# Patient Record
Sex: FEMALE | Race: BLACK OR AFRICAN AMERICAN | NOT HISPANIC OR LATINO | Employment: OTHER | URBAN - NONMETROPOLITAN AREA
[De-identification: names, ages, dates, MRNs, and addresses within clinical notes are randomized per-mention and may not be internally consistent; named-entity substitution may affect disease eponyms.]

---

## 2017-01-19 RX ORDER — HYDRALAZINE HYDROCHLORIDE 50 MG/1
TABLET, FILM COATED ORAL
Qty: 90 TABLET | Refills: 2 | Status: SHIPPED | OUTPATIENT
Start: 2017-01-19 | End: 2017-10-20 | Stop reason: SDUPTHER

## 2017-01-23 ENCOUNTER — OFFICE VISIT (OUTPATIENT)
Dept: FAMILY MEDICINE CLINIC | Facility: CLINIC | Age: 82
End: 2017-01-23

## 2017-01-23 VITALS
RESPIRATION RATE: 15 BRPM | BODY MASS INDEX: 33.12 KG/M2 | WEIGHT: 199 LBS | HEART RATE: 60 BPM | DIASTOLIC BLOOD PRESSURE: 67 MMHG | TEMPERATURE: 98 F | OXYGEN SATURATION: 95 % | SYSTOLIC BLOOD PRESSURE: 132 MMHG

## 2017-01-23 DIAGNOSIS — E11.65 UNCONTROLLED TYPE 2 DIABETES MELLITUS WITH HYPERGLYCEMIA, WITHOUT LONG-TERM CURRENT USE OF INSULIN (HCC): ICD-10-CM

## 2017-01-23 DIAGNOSIS — E53.8 VITAMIN B12 DEFICIENCY: ICD-10-CM

## 2017-01-23 DIAGNOSIS — E03.9 ACQUIRED HYPOTHYROIDISM: ICD-10-CM

## 2017-01-23 DIAGNOSIS — E78.2 MIXED HYPERLIPIDEMIA: ICD-10-CM

## 2017-01-23 DIAGNOSIS — I10 BENIGN ESSENTIAL HYPERTENSION: ICD-10-CM

## 2017-01-23 LAB
ALBUMIN SERPL-MCNC: 4.1 G/DL (ref 3.2–4.8)
ALBUMIN/GLOB SERPL: 1.2 G/DL (ref 1.5–2.5)
ALP SERPL-CCNC: 54 U/L (ref 25–100)
ALT SERPL W P-5'-P-CCNC: 38 U/L (ref 7–40)
ANION GAP SERPL CALCULATED.3IONS-SCNC: 12 MMOL/L (ref 3–11)
ARTICHOKE IGE QN: 133 MG/DL (ref 0–130)
AST SERPL-CCNC: 40 U/L (ref 0–33)
BASOPHILS # BLD AUTO: 0.04 10*3/MM3 (ref 0–0.2)
BASOPHILS NFR BLD AUTO: 0.6 % (ref 0–1)
BILIRUB SERPL-MCNC: 1.2 MG/DL (ref 0.3–1.2)
BUN BLD-MCNC: 15 MG/DL (ref 9–23)
BUN/CREAT SERPL: 16.7 (ref 7–25)
CALCIUM SPEC-SCNC: 9.7 MG/DL (ref 8.7–10.4)
CHLORIDE SERPL-SCNC: 97 MMOL/L (ref 99–109)
CHOLEST SERPL-MCNC: 231 MG/DL (ref 0–200)
CO2 SERPL-SCNC: 30 MMOL/L (ref 20–31)
CREAT BLD-MCNC: 0.9 MG/DL (ref 0.6–1.3)
DEPRECATED RDW RBC AUTO: 45 FL (ref 37–54)
EOSINOPHIL # BLD AUTO: 0.11 10*3/MM3 (ref 0.1–0.3)
EOSINOPHIL NFR BLD AUTO: 1.7 % (ref 0–3)
ERYTHROCYTE [DISTWIDTH] IN BLOOD BY AUTOMATED COUNT: 13.1 % (ref 11.3–14.5)
GFR SERPL CREATININE-BSD FRML MDRD: 73 ML/MIN/1.73
GLOBULIN UR ELPH-MCNC: 3.5 GM/DL
GLUCOSE BLD-MCNC: 146 MG/DL (ref 70–100)
HBA1C MFR BLD: 6.6 % (ref 4.8–5.6)
HCT VFR BLD AUTO: 47.7 % (ref 34.5–44)
HDLC SERPL-MCNC: 79 MG/DL (ref 40–60)
HGB BLD-MCNC: 16.1 G/DL (ref 11.5–15.5)
IMM GRANULOCYTES # BLD: 0.02 10*3/MM3 (ref 0–0.03)
IMM GRANULOCYTES NFR BLD: 0.3 % (ref 0–0.6)
LYMPHOCYTES # BLD AUTO: 1.93 10*3/MM3 (ref 0.6–4.8)
LYMPHOCYTES NFR BLD AUTO: 30.3 % (ref 24–44)
MCH RBC QN AUTO: 31.3 PG (ref 27–31)
MCHC RBC AUTO-ENTMCNC: 33.8 G/DL (ref 32–36)
MCV RBC AUTO: 92.8 FL (ref 80–99)
MONOCYTES # BLD AUTO: 0.48 10*3/MM3 (ref 0–1)
MONOCYTES NFR BLD AUTO: 7.5 % (ref 0–12)
NEUTROPHILS # BLD AUTO: 3.8 10*3/MM3 (ref 1.5–8.3)
NEUTROPHILS NFR BLD AUTO: 59.6 % (ref 41–71)
PLATELET # BLD AUTO: 182 10*3/MM3 (ref 150–450)
PMV BLD AUTO: 12.9 FL (ref 6–12)
POTASSIUM BLD-SCNC: 4 MMOL/L (ref 3.5–5.5)
PROT SERPL-MCNC: 7.6 G/DL (ref 5.7–8.2)
RBC # BLD AUTO: 5.14 10*6/MM3 (ref 3.89–5.14)
SODIUM BLD-SCNC: 139 MMOL/L (ref 132–146)
TRIGL SERPL-MCNC: 68 MG/DL (ref 0–150)
TSH SERPL DL<=0.05 MIU/L-ACNC: 2.21 MIU/ML (ref 0.35–5.35)
VIT B12 BLD-MCNC: 775 PG/ML (ref 211–911)
WBC NRBC COR # BLD: 6.38 10*3/MM3 (ref 3.5–10.8)

## 2017-01-23 PROCEDURE — 83036 HEMOGLOBIN GLYCOSYLATED A1C: CPT | Performed by: INTERNAL MEDICINE

## 2017-01-23 PROCEDURE — 85025 COMPLETE CBC W/AUTO DIFF WBC: CPT | Performed by: INTERNAL MEDICINE

## 2017-01-23 PROCEDURE — 80053 COMPREHEN METABOLIC PANEL: CPT | Performed by: INTERNAL MEDICINE

## 2017-01-23 PROCEDURE — 82607 VITAMIN B-12: CPT | Performed by: INTERNAL MEDICINE

## 2017-01-23 PROCEDURE — 84443 ASSAY THYROID STIM HORMONE: CPT | Performed by: INTERNAL MEDICINE

## 2017-01-23 PROCEDURE — 80061 LIPID PANEL: CPT | Performed by: INTERNAL MEDICINE

## 2017-01-23 PROCEDURE — 99213 OFFICE O/P EST LOW 20 MIN: CPT | Performed by: INTERNAL MEDICINE

## 2017-01-23 RX ORDER — FUROSEMIDE 20 MG/1
20 TABLET ORAL DAILY
Qty: 90 TABLET | Refills: 3 | Status: SHIPPED | OUTPATIENT
Start: 2017-01-23 | End: 2017-09-18 | Stop reason: SDUPTHER

## 2017-01-23 RX ORDER — METOPROLOL SUCCINATE 100 MG/1
100 TABLET, EXTENDED RELEASE ORAL DAILY
Qty: 90 TABLET | Refills: 3 | Status: SHIPPED | OUTPATIENT
Start: 2017-01-23 | End: 2017-09-18 | Stop reason: SDUPTHER

## 2017-01-23 RX ORDER — AMLODIPINE BESYLATE 10 MG/1
10 TABLET ORAL DAILY
Qty: 90 TABLET | Refills: 3 | Status: SHIPPED | OUTPATIENT
Start: 2017-01-23 | End: 2017-09-18 | Stop reason: SDUPTHER

## 2017-01-23 RX ORDER — OLMESARTAN MEDOXOMIL 40 MG/1
40 TABLET ORAL DAILY
Qty: 90 TABLET | Refills: 3 | Status: SHIPPED | OUTPATIENT
Start: 2017-01-23 | End: 2017-09-18 | Stop reason: SDUPTHER

## 2017-01-23 NOTE — PROGRESS NOTES
Subjective   Valerie Davila is a 81 y.o. female.     Chief Complaint   Patient presents with   • Diabetes   • Hypertension   • Hypothyroidism   • Hyperlipidemia       History of Present Illness   HPI: Patient is here to follow up on the blood pressure and cholesterol and sugar . The patient is taking the blood pressure medications as prescribed and has had no side effects. The patient also needs refills on medications . The patient is also here to follow up on the cholesterol and is trying to follow a diet. The patient is also here to follow on sugar. Patient also needs the medications to be refilled today. Patient is also to follow-up on her thyroid, she is fasting to get labwork done      The following portions of the patient's history were reviewed and updated as appropriate: allergies, current medications, past family history, past medical history, past social history, past surgical history and problem list.    Review of Systems   Constitutional: Negative for appetite change, fatigue and fever.   HENT: Negative for congestion, ear discharge, ear pain, sinus pressure and sore throat.    Eyes: Negative for pain and discharge.   Respiratory: Negative for cough, chest tightness, shortness of breath and wheezing.    Cardiovascular: Negative for chest pain, palpitations and leg swelling.   Gastrointestinal: Negative for abdominal pain, blood in stool, constipation, diarrhea and nausea.   Endocrine: Negative for cold intolerance and heat intolerance.   Genitourinary: Negative for dysuria, flank pain and frequency.   Musculoskeletal: Negative for back pain and joint swelling.   Skin: Negative for color change.   Allergic/Immunologic: Negative for environmental allergies and food allergies.   Neurological: Negative for dizziness, weakness, numbness and headaches.   Hematological: Negative for adenopathy. Does not bruise/bleed easily.   Psychiatric/Behavioral: Negative for behavioral problems and dysphoric mood. The  patient is not nervous/anxious.          Current Outpatient Prescriptions:   •  amLODIPine (NORVASC) 10 MG tablet, Take 1 tablet by mouth Daily., Disp: 90 tablet, Rfl: 3  •  furosemide (LASIX) 20 MG tablet, Take 1 tablet by mouth Daily., Disp: 90 tablet, Rfl: 3  •  hydrALAZINE (APRESOLINE) 50 MG tablet, TAKE ONE TABLET BY MOUTH DAILY, Disp: 90 tablet, Rfl: 2  •  levothyroxine (SYNTHROID) 75 MCG tablet, Take 1 tablet by mouth Daily., Disp: 90 tablet, Rfl: 2  •  metoprolol succinate XL (TOPROL-XL) 100 MG 24 hr tablet, Take 1 tablet by mouth Daily., Disp: 90 tablet, Rfl: 3  •  olmesartan (BENICAR) 40 MG tablet, Take 1 tablet by mouth Daily., Disp: 90 tablet, Rfl: 3  •  potassium chloride (KLOR-CON) 10 MEQ CR tablet, Take 1 tablet by mouth Daily., Disp: 90 tablet, Rfl: 3  •  pravastatin (PRAVACHOL) 40 MG tablet, Take 1 tablet by mouth Daily., Disp: 90 tablet, Rfl: 3  •  timolol (BETIMOL) 0.5 % ophthalmic solution, Apply  to eye., Disp: , Rfl:   •  travoprost, TA free, (TRAVATAN Z) 0.004 % solution ophthalmic solution, Apply  to eye., Disp: , Rfl:   •  amLODIPine (NORVASC) 10 MG tablet, , Disp: , Rfl:   •  KLOR-CON 10 MEQ CR tablet, TAKE ONE TABLET BY MOUTH DAILY, Disp: 90 tablet, Rfl: 2    Current Facility-Administered Medications:   •  cyanocobalamin injection 1,000 mcg, 1,000 mcg, Intramuscular, Q28 Days, Katarzyna Griggs MD, 1,000 mcg at 10/03/16 1518    Objective     Blood pressure 132/67, pulse 60, temperature 98 °F (36.7 °C), temperature source Oral, resp. rate 15, weight 199 lb (90.3 kg), SpO2 95 %.    Physical Exam   Constitutional: She is oriented to person, place, and time. She appears well-developed and well-nourished. No distress.   HENT:   Head: Normocephalic and atraumatic.   Right Ear: External ear normal.   Left Ear: External ear normal.   Nose: Nose normal.   Mouth/Throat: Oropharynx is clear and moist.   Eyes: Conjunctivae and EOM are normal. Pupils are equal, round, and reactive to light.   Neck: Neck  supple. No thyromegaly present.   Cardiovascular: Normal rate, regular rhythm and normal heart sounds.    Pulmonary/Chest: Effort normal and breath sounds normal. No respiratory distress.   Abdominal: Soft. Bowel sounds are normal. She exhibits no distension. There is no tenderness. There is no rebound.   Musculoskeletal: Normal range of motion. She exhibits no edema.   Lymphadenopathy:     She has no cervical adenopathy.   Neurological: She is alert and oriented to person, place, and time.   No gross motor or sensory deficits   Skin: Skin is warm. She is not diaphoretic.   Psychiatric: She has a normal mood and affect.   Nursing note and vitals reviewed.      Results for orders placed or performed in visit on 01/23/17   Comprehensive metabolic panel   Result Value Ref Range    Glucose 146 (H) 70 - 100 mg/dL    BUN 15 9 - 23 mg/dL    Creatinine 0.90 0.60 - 1.30 mg/dL    Sodium 139 132 - 146 mmol/L    Potassium 4.0 3.5 - 5.5 mmol/L    Chloride 97 (L) 99 - 109 mmol/L    CO2 30.0 20.0 - 31.0 mmol/L    Calcium 9.7 8.7 - 10.4 mg/dL    Total Protein 7.6 5.7 - 8.2 g/dL    Albumin 4.10 3.20 - 4.80 g/dL    ALT (SGPT) 38 7 - 40 U/L    AST (SGOT) 40 (H) 0 - 33 U/L    Alkaline Phosphatase 54 25 - 100 U/L    Total Bilirubin 1.2 0.3 - 1.2 mg/dL    eGFR  African Amer 73 >60 mL/min/1.73    Globulin 3.5 gm/dL    A/G Ratio 1.2 (L) 1.5 - 2.5 g/dL    BUN/Creatinine Ratio 16.7 7.0 - 25.0    Anion Gap 12.0 (H) 3.0 - 11.0 mmol/L   Lipid panel   Result Value Ref Range    Total Cholesterol 231 (H) 0 - 200 mg/dL    Triglycerides 68 0 - 150 mg/dL    HDL Cholesterol 79 (H) 40 - 60 mg/dL    LDL Cholesterol  133 (H) 0 - 130 mg/dL   Hemoglobin A1c   Result Value Ref Range    Hemoglobin A1C 6.60 (H) 4.80 - 5.60 %   TSH   Result Value Ref Range    TSH 2.208 0.350 - 5.350 mIU/mL   Vitamin B12   Result Value Ref Range    Vitamin B-12 775 211 - 911 pg/mL   CBC Auto Differential   Result Value Ref Range    WBC 6.38 3.50 - 10.80 10*3/mm3    RBC 5.14  3.89 - 5.14 10*6/mm3    Hemoglobin 16.1 (H) 11.5 - 15.5 g/dL    Hematocrit 47.7 (H) 34.5 - 44.0 %    MCV 92.8 80.0 - 99.0 fL    MCH 31.3 (H) 27.0 - 31.0 pg    MCHC 33.8 32.0 - 36.0 g/dL    RDW 13.1 11.3 - 14.5 %    RDW-SD 45.0 37.0 - 54.0 fl    MPV 12.9 (H) 6.0 - 12.0 fL    Platelets 182 150 - 450 10*3/mm3    Neutrophil % 59.6 41.0 - 71.0 %    Lymphocyte % 30.3 24.0 - 44.0 %    Monocyte % 7.5 0.0 - 12.0 %    Eosinophil % 1.7 0.0 - 3.0 %    Basophil % 0.6 0.0 - 1.0 %    Immature Grans % 0.3 0.0 - 0.6 %    Neutrophils, Absolute 3.80 1.50 - 8.30 10*3/mm3    Lymphocytes, Absolute 1.93 0.60 - 4.80 10*3/mm3    Monocytes, Absolute 0.48 0.00 - 1.00 10*3/mm3    Eosinophils, Absolute 0.11 0.10 - 0.30 10*3/mm3    Basophils, Absolute 0.04 0.00 - 0.20 10*3/mm3    Immature Grans, Absolute 0.02 0.00 - 0.03 10*3/mm3         Assessment/Plan   Valerie was seen today for diabetes, hypertension, hypothyroidism and hyperlipidemia.    Diagnoses and all orders for this visit:    Benign essential hypertension  -     CBC and Differential  -     CBC Auto Differential    Mixed hyperlipidemia  -     Comprehensive metabolic panel  -     Lipid panel    Uncontrolled type 2 diabetes mellitus with hyperglycemia, without long-term current use of insulin  -     Hemoglobin A1c  -     Cancel: Microalbumin / creatinine urine ratio    Acquired hypothyroidism  -     TSH    Vitamin B12 deficiency  -     Vitamin B12    Other orders  -     furosemide (LASIX) 20 MG tablet; Take 1 tablet by mouth Daily.  -     metoprolol succinate XL (TOPROL-XL) 100 MG 24 hr tablet; Take 1 tablet by mouth Daily.  -     amLODIPine (NORVASC) 10 MG tablet; Take 1 tablet by mouth Daily.  -     olmesartan (BENICAR) 40 MG tablet; Take 1 tablet by mouth Daily.          Discussion Summary:   1.Benign Essential Hypertension: will continue current medications, low sodium diet, patient to continue to monitor blood pressure.  2. Mixed hyperlipidemia: will continue current medications, cmp  and fasting lipid panel reviewed today, total cholesterol 198 , triglycerides 61 , hdl 64 , ldl 122 , patient is advised to follow a low cholesterol diet  3.Diabetes Mellitus: patient is advised to continue all current medications and these have been reviewed today, current fasting sugar 155, hba1c 6.2 , fasting sugar, urine microalbumin and hba1c to be obtained prior to return visit. She had her annual diabetic eye exam  4.hypothyroid: tsh reviewed 0.128 , continue synthroid.       Katarzyna Griggs MD

## 2017-01-23 NOTE — MR AVS SNAPSHOT
Valerie Davila   1/23/2017 9:30 AM   Office Visit    Dept Phone:  [phone]   Encounter #:  [id number]    Provider:  Katarzyna Griggs MD   Department:  [facility] PRIMARY CARE                Your Full Care Plan              Where to Get Your Medications      [facility] [address] [place] [address] - [address] [place], [place] [address]. - [phone]  - [phone]   [address] [facility] [address]     Phone:  [phone]     amLODIPine 10 MG tablet    furosemide 20 MG tablet    metoprolol succinate  MG 24 hr tablet    olmesartan 40 MG tablet            Your Updated Medication List          This list is accurate as of: 1/23/17 10:36 AM.  Always use your most recent med list.                * amLODIPine 10 MG tablet   Commonly known as:  NORVASC       * amLODIPine 10 MG tablet   Commonly known as:  NORVASC   Take 1 tablet by mouth Daily.       BETIMOL 0.5 % ophthalmic solution   Generic drug:  timolol       furosemide 20 MG tablet   Commonly known as:  LASIX   Take 1 tablet by mouth Daily.       hydrALAZINE 50 MG tablet   Commonly known as:  APRESOLINE   TAKE ONE TABLET BY MOUTH DAILY       levothyroxine 75 MCG tablet   Commonly known as:  SYNTHROID   Take 1 tablet by mouth Daily.       metoprolol succinate  MG 24 hr tablet   Commonly known as:  TOPROL-XL   Take 1 tablet by mouth Daily.       olmesartan 40 MG tablet   Commonly known as:  BENICAR   Take 1 tablet by mouth Daily.       * potassium chloride 10 MEQ CR tablet   Commonly known as:  KLOR-CON   Take 1 tablet by mouth Daily.       * KLOR-CON 10 MEQ CR tablet   Generic drug:  potassium chloride   TAKE ONE TABLET BY MOUTH DAILY       pravastatin 40 MG tablet   Commonly known as:  PRAVACHOL   Take 1 tablet by mouth Daily.       TRAVATAN Z 0.004 % solution ophthalmic solution   Generic drug:  travoprost (TA free)       * Notice:  This list has 4 medication(s) that  are the same as other medications prescribed for you. Read the directions carefully, and ask your doctor or other care provider to review them with you.            We Performed the Following     CBC and Differential     CBC Auto Differential     Comprehensive metabolic panel     Hemoglobin A1c     Lipid panel     TSH     Vitamin B12       You Were Diagnosed With        Codes Comments    Benign essential hypertension     ICD-10-CM: I10  ICD-9-CM: 401.1     Mixed hyperlipidemia     ICD-10-CM: E78.2  ICD-9-CM: 272.2     Uncontrolled type 2 diabetes mellitus with hyperglycemia, without long-term current use of insulin     ICD-10-CM: E11.65  ICD-9-CM: 250.02     Acquired hypothyroidism     ICD-10-CM: E03.9  ICD-9-CM: 244.9     Vitamin B12 deficiency     ICD-10-CM: E53.8  ICD-9-CM: 266.2       Medications to be Given to You by a Medical Professional     Due       Frequency    10/31/2016 cyanocobalamin injection 1,000 mcg  Every 28 Days      Instructions     None    Patient Instructions History      Upcoming Appointments     Visit Type Date Time Department    OFFICE VISIT 1/23/2017  9:30 AM [facility]      [email or web address] Signup     Our records indicate that you have an active [employer] account.    You can view your After Visit Summary by going to [employer] and logging in with your [email or web address] username and password.  If you don't have a [email or web address] username and password but a parent or guardian has access to your record, the parent or guardian should login with their own [email or web address] username and password and access your record to view the After Visit Summary.    If you have questions, you can email [email or web address] or call [phone] to talk to our [email or web address] staff.  Remember, [email or web address] is NOT to be used for urgent needs.  For medical emergencies, dial 911.               Other Info from Your Visit           Allergies     Aspirin      Metformin        Reason for Visit     Diabetes     Hypertension      Hypothyroidism     Hyperlipidemia           Vital Signs     Blood Pressure Pulse Temperature Respirations Weight Oxygen Saturation    132/67 60 98 °F (36.7 °C) (Oral) 15 199 lb (90.3 kg) 95%    Body Mass Index Smoking Status                33.12 kg/m2 Former Smoker          Problems and Diagnoses Noted     Benign essential hypertension    Underactive thyroid    Type II diabetes mellitus without control    Mixed hyperlipidemia        Vitamin B12 deficiency          Results

## 2017-04-22 ENCOUNTER — HOSPITAL ENCOUNTER (EMERGENCY)
Facility: HOSPITAL | Age: 82
Discharge: HOME | End: 2017-04-22
Attending: EMERGENCY MEDICINE | Admitting: EMERGENCY MEDICINE

## 2017-04-22 VITALS
RESPIRATION RATE: 18 BRPM | OXYGEN SATURATION: 95 % | SYSTOLIC BLOOD PRESSURE: 160 MMHG | HEART RATE: 62 BPM | WEIGHT: 203 LBS | BODY MASS INDEX: 33.82 KG/M2 | HEIGHT: 65 IN | TEMPERATURE: 98.6 F | DIASTOLIC BLOOD PRESSURE: 69 MMHG

## 2017-04-22 DIAGNOSIS — H81.10 BPV (BENIGN POSITIONAL VERTIGO), UNSPECIFIED LATERALITY: Primary | ICD-10-CM

## 2017-04-22 DIAGNOSIS — R11.2 NAUSEA AND VOMITING, INTRACTABILITY OF VOMITING NOT SPECIFIED, UNSPECIFIED VOMITING TYPE: ICD-10-CM

## 2017-04-22 DIAGNOSIS — IMO0001 UNCONTROLLED TYPE 2 DIABETES MELLITUS WITHOUT COMPLICATION, WITHOUT LONG-TERM CURRENT USE OF INSULIN: ICD-10-CM

## 2017-04-22 LAB
ALBUMIN SERPL-MCNC: 4.1 G/DL (ref 3.5–5)
ALBUMIN/GLOB SERPL: 1 G/DL (ref 1–2)
ALP SERPL-CCNC: 91 U/L (ref 38–126)
ALT SERPL W P-5'-P-CCNC: 37 U/L (ref 13–69)
ANION GAP SERPL CALCULATED.3IONS-SCNC: 19 MMOL/L
AST SERPL-CCNC: 32 U/L (ref 15–46)
BASOPHILS # BLD AUTO: 0.03 10*3/MM3 (ref 0–0.2)
BASOPHILS NFR BLD AUTO: 0.3 % (ref 0–2.5)
BILIRUB SERPL-MCNC: 1.4 MG/DL (ref 0.2–1.3)
BILIRUB UR QL STRIP: NEGATIVE
BUN BLD-MCNC: 14 MG/DL (ref 7–20)
BUN/CREAT SERPL: 17.5 (ref 7.1–23.5)
CALCIUM SPEC-SCNC: 9.3 MG/DL (ref 8.4–10.2)
CHLORIDE SERPL-SCNC: 97 MMOL/L (ref 98–107)
CLARITY UR: CLEAR
CO2 SERPL-SCNC: 28 MMOL/L (ref 26–30)
COLOR UR: YELLOW
CREAT BLD-MCNC: 0.8 MG/DL (ref 0.6–1.3)
DEPRECATED RDW RBC AUTO: 40.1 FL (ref 37–54)
EOSINOPHIL # BLD AUTO: 0.03 10*3/MM3 (ref 0–0.7)
EOSINOPHIL NFR BLD AUTO: 0.3 % (ref 0–7)
ERYTHROCYTE [DISTWIDTH] IN BLOOD BY AUTOMATED COUNT: 12.1 % (ref 11.5–14.5)
GFR SERPL CREATININE-BSD FRML MDRD: 84 ML/MIN/1.73
GLOBULIN UR ELPH-MCNC: 4.2 GM/DL
GLUCOSE BLD-MCNC: 259 MG/DL (ref 74–98)
GLUCOSE UR STRIP-MCNC: ABNORMAL MG/DL
HCT VFR BLD AUTO: 49.3 % (ref 37–47)
HGB BLD-MCNC: 16.8 G/DL (ref 12–16)
HGB UR QL STRIP.AUTO: NEGATIVE
HOLD SPECIMEN: NORMAL
HOLD SPECIMEN: NORMAL
IMM GRANULOCYTES # BLD: 0.12 10*3/MM3 (ref 0–0.06)
IMM GRANULOCYTES NFR BLD: 1.1 % (ref 0–0.6)
KETONES UR QL STRIP: NEGATIVE
LEUKOCYTE ESTERASE UR QL STRIP.AUTO: NEGATIVE
LYMPHOCYTES # BLD AUTO: 1.5 10*3/MM3 (ref 0.6–3.4)
LYMPHOCYTES NFR BLD AUTO: 13.9 % (ref 10–50)
MCH RBC QN AUTO: 30.8 PG (ref 27–31)
MCHC RBC AUTO-ENTMCNC: 34.1 G/DL (ref 30–37)
MCV RBC AUTO: 90.3 FL (ref 81–99)
MONOCYTES # BLD AUTO: 0.54 10*3/MM3 (ref 0–0.9)
MONOCYTES NFR BLD AUTO: 5 % (ref 0–12)
NEUTROPHILS # BLD AUTO: 8.57 10*3/MM3 (ref 2–6.9)
NEUTROPHILS NFR BLD AUTO: 79.4 % (ref 37–80)
NITRITE UR QL STRIP: NEGATIVE
NRBC BLD MANUAL-RTO: 0 /100 WBC (ref 0–0)
PH UR STRIP.AUTO: 6.5 [PH] (ref 5–8)
PLATELET # BLD AUTO: 193 10*3/MM3 (ref 130–400)
PMV BLD AUTO: 12.6 FL (ref 6–12)
POTASSIUM BLD-SCNC: 4 MMOL/L (ref 3.5–5.1)
PROT SERPL-MCNC: 8.3 G/DL (ref 6.3–8.2)
PROT UR QL STRIP: ABNORMAL
RBC # BLD AUTO: 5.46 10*6/MM3 (ref 4.2–5.4)
SODIUM BLD-SCNC: 140 MMOL/L (ref 137–145)
SP GR UR STRIP: 1.02 (ref 1–1.03)
TROPONIN I SERPL-MCNC: 0.01 NG/ML (ref 0–0.03)
UROBILINOGEN UR QL STRIP: ABNORMAL
WBC NRBC COR # BLD: 10.79 10*3/MM3 (ref 4.8–10.8)
WHOLE BLOOD HOLD SPECIMEN: NORMAL
WHOLE BLOOD HOLD SPECIMEN: NORMAL

## 2017-04-22 PROCEDURE — 99284 EMERGENCY DEPT VISIT MOD MDM: CPT

## 2017-04-22 PROCEDURE — 84484 ASSAY OF TROPONIN QUANT: CPT | Performed by: NURSE PRACTITIONER

## 2017-04-22 PROCEDURE — 81003 URINALYSIS AUTO W/O SCOPE: CPT | Performed by: EMERGENCY MEDICINE

## 2017-04-22 PROCEDURE — 80053 COMPREHEN METABOLIC PANEL: CPT | Performed by: NURSE PRACTITIONER

## 2017-04-22 PROCEDURE — 93005 ELECTROCARDIOGRAM TRACING: CPT | Performed by: NURSE PRACTITIONER

## 2017-04-22 PROCEDURE — 85025 COMPLETE CBC W/AUTO DIFF WBC: CPT | Performed by: NURSE PRACTITIONER

## 2017-04-22 RX ORDER — SODIUM CHLORIDE 0.9 % (FLUSH) 0.9 %
10 SYRINGE (ML) INJECTION AS NEEDED
Status: DISCONTINUED | OUTPATIENT
Start: 2017-04-22 | End: 2017-04-23 | Stop reason: HOSPADM

## 2017-04-22 RX ORDER — ONDANSETRON 4 MG/1
4 TABLET, FILM COATED ORAL EVERY 6 HOURS PRN
Qty: 15 TABLET | Refills: 0 | Status: SHIPPED | OUTPATIENT
Start: 2017-04-22 | End: 2018-05-01

## 2017-04-22 RX ORDER — ONDANSETRON 4 MG/1
4 TABLET, FILM COATED ORAL ONCE
Status: COMPLETED | OUTPATIENT
Start: 2017-04-22 | End: 2017-04-22

## 2017-04-22 RX ADMIN — ONDANSETRON 4 MG: 4 TABLET, FILM COATED ORAL at 22:15

## 2017-04-22 NOTE — ED PROVIDER NOTES
Subjective   HPI Comments: Patient is an 81-year-old female that presents with one-day history of vertigo with body position changes followed by nausea and vomiting.  She denies fever or chills.  She denies chest pain or shortness of breath.  She states that she is a diet-controlled diabetic.  She does have a history of hypertension.  She states that she has had no changes in bowel or bladder.      Review of Systems   Respiratory: Negative for chest tightness and shortness of breath.    Cardiovascular: Negative for chest pain, palpitations and leg swelling.   Gastrointestinal: Positive for diarrhea, nausea and vomiting.   Neurological: Positive for dizziness and light-headedness.   All other systems reviewed and are negative.      Past Medical History:   Diagnosis Date   • Abnormal weight loss    • Acute bronchitis    • Allergic rhinitis    • Arthritis    • Benign neoplasm of large bowel     Colon polyp   • Cellulitis    • Diabetes    • Diverticulitis of colon    • Dizziness    • Dysphagia    • Early onset Alzheimer's disease with behavioral disturbance    • Edema    • Enthesopathy of knee    • Esophageal reflux    • Fainting    • Glaucoma    • Hyperlipidemia    • Hypothyroidism    • Knee pain    • Loss of memory    • Mastodynia    • Osteoarthritis of knee    • Proteinuria    • Subacute delirium    • Vertigo    • Vitamin B12 deficiency    • Vitamin D deficiency        Allergies   Allergen Reactions   • Aspirin    • Metformin        Past Surgical History:   Procedure Laterality Date   • CATARACT EXTRACTION  1990   • COLECTOMY PARTIAL / TOTAL      DIVERTICULITIS   • HYSTERECTOMY  1983    and bilateral salpingo-oophorectomy   • KNEE ARTHROSCOPY Right 2013   • KNEE SURGERY         Family History   Problem Relation Age of Onset   • Pneumonia Mother      CAUSED HER DEATH IN HER 70'S   • Heart attack Father    • Stroke Other        Social History     Social History   • Marital status: Single     Spouse name: N/A   • Number  of children: N/A   • Years of education: N/A     Social History Main Topics   • Smoking status: Former Smoker   • Smokeless tobacco: None   • Alcohol use No   • Drug use: No   • Sexual activity: Defer     Other Topics Concern   • None     Social History Narrative           Objective   Physical Exam   Constitutional: She is oriented to person, place, and time. She appears well-developed and well-nourished.   HENT:   Head: Normocephalic and atraumatic.   Right Ear: External ear normal.   Left Ear: External ear normal.   Eyes: EOM are normal. Pupils are equal, round, and reactive to light.   Neck: Normal range of motion. Neck supple.   No bruit   Cardiovascular: Normal rate and regular rhythm.    Pulmonary/Chest: Effort normal and breath sounds normal.   Abdominal: Soft. Bowel sounds are normal. She exhibits no distension and no mass. There is no tenderness.   Musculoskeletal: Normal range of motion. She exhibits no edema.   Neurological: She is alert and oriented to person, place, and time. She has normal reflexes. No cranial nerve deficit. Coordination normal.   Patient was negative for Romberg.   Skin: Skin is warm and dry.   Psychiatric: She has a normal mood and affect. Her behavior is normal. Judgment and thought content normal.   Nursing note and vitals reviewed.    Labs Reviewed   URINALYSIS W/ CULTURE IF INDICATED - Abnormal; Notable for the following:        Result Value    Glucose,  mg/dL (2+) (*)     Protein, UA Trace (*)     All other components within normal limits    Narrative:     Urine microscopic not indicated.   COMPREHENSIVE METABOLIC PANEL - Abnormal; Notable for the following:     Glucose 259 (*)     Chloride 97 (*)     Total Protein 8.3 (*)     Total Bilirubin 1.4 (*)     All other components within normal limits    Narrative:     The MDRD GFR formula is only valid for adults with stable renal function between ages 18 and 70.   CBC WITH AUTO DIFFERENTIAL - Abnormal; Notable for the  following:     RBC 5.46 (*)     Hemoglobin 16.8 (*)     Hematocrit 49.3 (*)     MPV 12.6 (*)     Immature Grans % 1.1 (*)     Neutrophils, Absolute 8.57 (*)     Immature Grans, Absolute 0.12 (*)     All other components within normal limits   TROPONIN (IN-HOUSE) - Normal    Narrative:     Normal Patient Upper Reference Limit (URL) (99th Percentile)=0.03 ng/mL   Non-AMI Illness Reference Limit=0.03-0.11 ng/mL   AMI Confirmation=0.12 ng/mL and above   RAINBOW DRAW    Narrative:     The following orders were created for panel order [place] Draw.  Procedure                               Abnormality         Status                     ---------                               -----------         ------                     Light Blue Top[[id number]]                                    Final result               Lavender Top[[id number]]                                      Final result               Gold Top - SST[[id number]]                                    Final result               Green Top (No Gel)[[id number]]                                Final result                 Please view results for these tests on the individual orders.   LIGHT BLUE TOP   LAVENDER TOP   GOLD TOP - SST   GREEN TOP NO GEL       Procedures         ED Course  ED Course                  MDM    Final diagnoses:   BPV (benign positional vertigo), unspecified laterality   Nausea and vomiting, intractability of vomiting not specified, unspecified vomiting type   Uncontrolled type 2 diabetes mellitus without complication, without long-term current use of insulin            Kevin Freire, APRN  04/22/17 3128

## 2017-05-01 ENCOUNTER — OFFICE VISIT (OUTPATIENT)
Dept: FAMILY MEDICINE CLINIC | Facility: CLINIC | Age: 82
End: 2017-05-01

## 2017-05-01 VITALS
RESPIRATION RATE: 16 BRPM | BODY MASS INDEX: 33.16 KG/M2 | SYSTOLIC BLOOD PRESSURE: 151 MMHG | TEMPERATURE: 98 F | HEART RATE: 66 BPM | WEIGHT: 199.25 LBS | OXYGEN SATURATION: 95 % | DIASTOLIC BLOOD PRESSURE: 67 MMHG

## 2017-05-01 DIAGNOSIS — E53.8 VITAMIN B12 DEFICIENCY: ICD-10-CM

## 2017-05-01 DIAGNOSIS — E78.2 MIXED HYPERLIPIDEMIA: ICD-10-CM

## 2017-05-01 DIAGNOSIS — F03.90 DEMENTIA WITHOUT BEHAVIORAL DISTURBANCE, UNSPECIFIED DEMENTIA TYPE: ICD-10-CM

## 2017-05-01 DIAGNOSIS — Z23 NEED FOR PNEUMOCOCCAL VACCINATION: ICD-10-CM

## 2017-05-01 DIAGNOSIS — IMO0002 TYPE II DIABETES MELLITUS, UNCONTROLLED: ICD-10-CM

## 2017-05-01 DIAGNOSIS — E03.8 ADULT ONSET HYPOTHYROIDISM: ICD-10-CM

## 2017-05-01 DIAGNOSIS — E78.00 PURE HYPERCHOLESTEROLEMIA: ICD-10-CM

## 2017-05-01 DIAGNOSIS — I10 BENIGN ESSENTIAL HYPERTENSION: Primary | ICD-10-CM

## 2017-05-01 DIAGNOSIS — E11.9 CONTROLLED TYPE 2 DIABETES MELLITUS WITHOUT COMPLICATION, WITHOUT LONG-TERM CURRENT USE OF INSULIN (HCC): ICD-10-CM

## 2017-05-01 DIAGNOSIS — E03.9 ACQUIRED HYPOTHYROIDISM: ICD-10-CM

## 2017-05-01 PROCEDURE — 99214 OFFICE O/P EST MOD 30 MIN: CPT | Performed by: INTERNAL MEDICINE

## 2017-05-01 PROCEDURE — 90732 PPSV23 VACC 2 YRS+ SUBQ/IM: CPT | Performed by: INTERNAL MEDICINE

## 2017-05-01 PROCEDURE — G0009 ADMIN PNEUMOCOCCAL VACCINE: HCPCS | Performed by: INTERNAL MEDICINE

## 2017-05-02 LAB
ALBUMIN SERPL-MCNC: 4.1 G/DL (ref 3.5–5)
ALBUMIN/CREAT UR: <29.7 MG/G CREAT (ref 0–30)
ALBUMIN/GLOB SERPL: 1.1 G/DL (ref 1–2)
ALP SERPL-CCNC: 83 U/L (ref 38–126)
ALT SERPL-CCNC: 37 U/L (ref 13–69)
AST SERPL-CCNC: 34 U/L (ref 15–46)
BILIRUB SERPL-MCNC: 1.5 MG/DL (ref 0.2–1.3)
BUN SERPL-MCNC: 15 MG/DL (ref 7–20)
BUN/CREAT SERPL: 16.7 (ref 7.1–23.5)
CALCIUM SERPL-MCNC: 9.6 MG/DL (ref 8.4–10.2)
CHLORIDE SERPL-SCNC: 103 MMOL/L (ref 98–107)
CHOLEST SERPL-MCNC: 253 MG/DL (ref 0–199)
CO2 SERPL-SCNC: 29 MMOL/L (ref 26–30)
CREAT SERPL-MCNC: 0.9 MG/DL (ref 0.6–1.3)
CREAT UR-MCNC: 10.1 MG/DL
GLOBULIN SER CALC-MCNC: 3.6 GM/DL
GLUCOSE SERPL-MCNC: 233 MG/DL (ref 74–98)
HBA1C MFR BLD: 7.2 %
HDLC SERPL-MCNC: 84 MG/DL (ref 40–60)
LDLC SERPL CALC-MCNC: 153 MG/DL (ref 0–99)
MICROALBUMIN UR-MCNC: <3 UG/ML
POTASSIUM SERPL-SCNC: 4.3 MMOL/L (ref 3.5–5.1)
PROT SERPL-MCNC: 7.7 G/DL (ref 6.3–8.2)
SODIUM SERPL-SCNC: 141 MMOL/L (ref 137–145)
TRIGL SERPL-MCNC: 80 MG/DL
TSH SERPL DL<=0.005 MIU/L-ACNC: 1.33 MIU/ML (ref 0.47–4.68)
VIT B12 SERPL-MCNC: 675 PG/ML (ref 239–931)
VLDLC SERPL CALC-MCNC: 16 MG/DL

## 2017-05-22 ENCOUNTER — OFFICE VISIT (OUTPATIENT)
Dept: FAMILY MEDICINE CLINIC | Facility: CLINIC | Age: 82
End: 2017-05-22

## 2017-05-22 VITALS
WEIGHT: 198.75 LBS | BODY MASS INDEX: 33.07 KG/M2 | TEMPERATURE: 97.5 F | HEART RATE: 60 BPM | SYSTOLIC BLOOD PRESSURE: 142 MMHG | RESPIRATION RATE: 16 BRPM | OXYGEN SATURATION: 96 % | DIASTOLIC BLOOD PRESSURE: 79 MMHG

## 2017-05-22 DIAGNOSIS — Z00.00 ROUTINE GENERAL MEDICAL EXAMINATION AT A HEALTH CARE FACILITY: Primary | ICD-10-CM

## 2017-05-22 PROCEDURE — G0439 PPPS, SUBSEQ VISIT: HCPCS | Performed by: INTERNAL MEDICINE

## 2017-09-18 ENCOUNTER — OFFICE VISIT (OUTPATIENT)
Dept: FAMILY MEDICINE CLINIC | Facility: CLINIC | Age: 82
End: 2017-09-18

## 2017-09-18 VITALS
WEIGHT: 205.75 LBS | SYSTOLIC BLOOD PRESSURE: 178 MMHG | RESPIRATION RATE: 16 BRPM | HEART RATE: 62 BPM | DIASTOLIC BLOOD PRESSURE: 72 MMHG | BODY MASS INDEX: 34.24 KG/M2 | TEMPERATURE: 97.7 F | OXYGEN SATURATION: 97 %

## 2017-09-18 DIAGNOSIS — E78.2 MIXED HYPERLIPIDEMIA: ICD-10-CM

## 2017-09-18 DIAGNOSIS — I10 BENIGN ESSENTIAL HYPERTENSION: Primary | ICD-10-CM

## 2017-09-18 DIAGNOSIS — R42 VERTIGO: ICD-10-CM

## 2017-09-18 DIAGNOSIS — E53.8 VITAMIN B12 DEFICIENCY: ICD-10-CM

## 2017-09-18 DIAGNOSIS — IMO0002 TYPE II DIABETES MELLITUS, UNCONTROLLED: ICD-10-CM

## 2017-09-18 DIAGNOSIS — Z23 NEED FOR INFLUENZA VACCINATION: ICD-10-CM

## 2017-09-18 DIAGNOSIS — E03.9 ACQUIRED HYPOTHYROIDISM: ICD-10-CM

## 2017-09-18 PROCEDURE — G0008 ADMIN INFLUENZA VIRUS VAC: HCPCS | Performed by: INTERNAL MEDICINE

## 2017-09-18 PROCEDURE — 90662 IIV NO PRSV INCREASED AG IM: CPT | Performed by: INTERNAL MEDICINE

## 2017-09-18 PROCEDURE — 99214 OFFICE O/P EST MOD 30 MIN: CPT | Performed by: INTERNAL MEDICINE

## 2017-09-18 RX ORDER — POTASSIUM CHLORIDE 750 MG/1
10 TABLET, EXTENDED RELEASE ORAL DAILY
Qty: 90 TABLET | Refills: 3 | Status: SHIPPED | OUTPATIENT
Start: 2017-09-18 | End: 2018-08-27 | Stop reason: SDUPTHER

## 2017-09-18 RX ORDER — LEVOTHYROXINE SODIUM 0.07 MG/1
75 TABLET ORAL DAILY
Qty: 90 TABLET | Refills: 3 | Status: SHIPPED | OUTPATIENT
Start: 2017-09-18 | End: 2018-01-24 | Stop reason: SDUPTHER

## 2017-09-18 RX ORDER — PRAVASTATIN SODIUM 40 MG
40 TABLET ORAL DAILY
Qty: 90 TABLET | Refills: 3 | Status: SHIPPED | OUTPATIENT
Start: 2017-09-18 | End: 2018-08-27 | Stop reason: SDUPTHER

## 2017-09-18 RX ORDER — FUROSEMIDE 20 MG/1
20 TABLET ORAL DAILY
Qty: 90 TABLET | Refills: 3 | Status: SHIPPED | OUTPATIENT
Start: 2017-09-18 | End: 2018-08-27 | Stop reason: SDUPTHER

## 2017-09-18 RX ORDER — AMLODIPINE BESYLATE 10 MG/1
10 TABLET ORAL DAILY
Qty: 90 TABLET | Refills: 3 | Status: SHIPPED | OUTPATIENT
Start: 2017-09-18 | End: 2018-05-01 | Stop reason: ALTCHOICE

## 2017-09-18 RX ORDER — METOPROLOL SUCCINATE 100 MG/1
100 TABLET, EXTENDED RELEASE ORAL DAILY
Qty: 90 TABLET | Refills: 3 | Status: SHIPPED | OUTPATIENT
Start: 2017-09-18 | End: 2018-05-01 | Stop reason: ALTCHOICE

## 2017-09-18 RX ORDER — OLMESARTAN MEDOXOMIL 40 MG/1
40 TABLET ORAL DAILY
Qty: 90 TABLET | Refills: 3 | Status: SHIPPED | OUTPATIENT
Start: 2017-09-18 | End: 2018-08-27 | Stop reason: SDUPTHER

## 2017-09-18 NOTE — PROGRESS NOTES
Subjective   Valerie Davila is a 81 y.o. female.     Chief Complaint   Patient presents with   • Diabetes   • Hyperlipidemia   • Hypertension   • Hypothyroidism     HPI:   Patient is here to follow up on the blood pressure and cholesterol and sugar . The patient is taking the blood pressure medications as prescribed and has had no side effects. The patient also needs refills on medications . The patient is also here to follow up on the cholesterol and is trying to follow a diet. The patient is also here to follow on sugar. Patient also needs the medications to be refilled today. Patient is also to follow-up on her thyroid, she is fasting to get labwork done  , she needs a  flu shot, she also ,per daughter ,  She was seen in the er a few months ago for vertigo which is worsening and she would ent referral , her er noted have been reviewed  ,  [place] , she has someone at the house for 24 hrs  Diabetes   Pertinent negatives for hypoglycemia include no headaches or nervousness/anxiousness. Pertinent negatives for diabetes include no chest pain, no fatigue and no weakness.   Hyperlipidemia   Exacerbating diseases include hypothyroidism. Pertinent negatives include no chest pain or shortness of breath.   Hypertension   Pertinent negatives include no chest pain, headaches, palpitations or shortness of breath.   Hypothyroidism   Pertinent negatives include no abdominal pain, chest pain, congestion, coughing, fatigue, fever, headaches, joint swelling, nausea, numbness, sore throat or weakness.      The following portions of the patient's history were reviewed and updated as appropriate: allergies, current medications, past family history, past medical history, past social history, past surgical history and problem list.    Review of Systems   Constitutional: Negative for appetite change, fatigue and fever.   HENT: Negative for congestion, ear discharge, ear  pain, sinus pressure and sore throat.    Eyes: Negative for pain and discharge.   Respiratory: Negative for cough, chest tightness, shortness of breath and wheezing.    Cardiovascular: Negative for chest pain, palpitations and leg swelling.   Gastrointestinal: Negative for abdominal pain, blood in stool, constipation, diarrhea and nausea.   Endocrine: Negative for cold intolerance and heat intolerance.   Genitourinary: Negative for dysuria, flank pain and frequency.   Musculoskeletal: Negative for back pain and joint swelling.   Skin: Negative for color change.   Allergic/Immunologic: Negative for environmental allergies and food allergies.   Neurological: Negative for weakness, numbness and headaches.        Vertigo   Hematological: Negative for adenopathy. Does not bruise/bleed easily.   Psychiatric/Behavioral: Negative for behavioral problems and dysphoric mood. The patient is not nervous/anxious.          Current Outpatient Prescriptions:   •  amLODIPine (NORVASC) 10 MG tablet, Take 1 tablet by mouth Daily., Disp: 90 tablet, Rfl: 3  •  furosemide (LASIX) 20 MG tablet, Take 1 tablet by mouth Daily., Disp: 90 tablet, Rfl: 3  •  hydrALAZINE (APRESOLINE) 50 MG tablet, TAKE ONE TABLET BY MOUTH DAILY, Disp: 90 tablet, Rfl: 2  •  KLOR-CON 10 MEQ CR tablet, TAKE ONE TABLET BY MOUTH DAILY, Disp: 90 tablet, Rfl: 2  •  levothyroxine (SYNTHROID) 75 MCG tablet, Take 1 tablet by mouth Daily., Disp: 90 tablet, Rfl: 3  •  metoprolol succinate XL (TOPROL-XL) 100 MG 24 hr tablet, Take 1 tablet by mouth Daily., Disp: 90 tablet, Rfl: 3  •  olmesartan (BENICAR) 40 MG tablet, Take 1 tablet by mouth Daily., Disp: 90 tablet, Rfl: 3  •  ondansetron (ZOFRAN) 4 MG tablet, Take 1 tablet by mouth Every 6 (Six) Hours As Needed for Nausea or Vomiting., Disp: 15 tablet, Rfl: 0  •  potassium chloride (KLOR-CON) 10 MEQ CR tablet, Take 1 tablet by mouth Daily., Disp: 90 tablet, Rfl: 3  •  pravastatin (PRAVACHOL) 40 MG tablet, Take 1 tablet by  mouth Daily., Disp: 90 tablet, Rfl: 3  •  PredniSONE (DELTASONE) 10 MG (21) tablet pack, Daily taper- 6/5/4/3/2/1, Disp: 21 tablet, Rfl: 0  •  timolol (BETIMOL) 0.5 % ophthalmic solution, Apply  to eye., Disp: , Rfl:   •  travoprost, TA free, (TRAVATAN Z) 0.004 % solution ophthalmic solution, Apply  to eye., Disp: , Rfl:     Current Facility-Administered Medications:   •  cyanocobalamin injection 1,000 mcg, 1,000 mcg, Intramuscular, Q28 Days, Katarzyna Griggs MD, 1,000 mcg at 10/03/16 1518    Objective     Blood pressure 178/72, pulse 62, temperature 97.7 °F (36.5 °C), temperature source Oral, resp. rate 16, weight 205 lb 12 oz (93.3 kg), SpO2 97 %.    Physical Exam   Constitutional: She is oriented to person, place, and time. She appears well-developed and well-nourished. No distress.   HENT:   Head: Normocephalic and atraumatic.   Right Ear: External ear normal.   Left Ear: External ear normal.   Nose: Nose normal.   Mouth/Throat: Oropharynx is clear and moist.   Eyes: Conjunctivae and EOM are normal. Pupils are equal, round, and reactive to light.   Neck: Neck supple. No thyromegaly present.   Cardiovascular: Normal rate, regular rhythm and normal heart sounds.    Pulmonary/Chest: Effort normal and breath sounds normal. No respiratory distress.   Abdominal: Soft. Bowel sounds are normal. She exhibits no distension. There is no tenderness. There is no rebound.   Musculoskeletal: Normal range of motion. She exhibits no edema.   Lymphadenopathy:     She has no cervical adenopathy.   Neurological: She is alert and oriented to person, place, and time.   No gross motor or sensory deficits   Skin: Skin is warm. She is not diaphoretic.   Psychiatric: She has a normal mood and affect.   Nursing note and vitals reviewed.      Results for orders placed or performed in visit on 05/01/17   Comprehensive metabolic panel   Result Value Ref Range    Glucose 233 (H) 74 - 98 mg/dL    BUN 15 7 - 20 mg/dL    Creatinine 0.90 0.60 -  1.30 mg/dL    eGFR Non African Am 60 (L) >60 mL/min/1.73    eGFR African Am 73 >60 mL/min/1.73    BUN/Creatinine Ratio 16.7 7.1 - 23.5    Sodium 141 137 - 145 mmol/L    Potassium 4.3 3.5 - 5.1 mmol/L    Chloride 103 98 - 107 mmol/L    Total CO2 29.0 26.0 - 30.0 mmol/L    Calcium 9.6 8.4 - 10.2 mg/dL    Total Protein 7.7 6.3 - 8.2 g/dL    Albumin 4.10 3.50 - 5.00 g/dL    Globulin 3.6 gm/dL    A/G Ratio 1.1 1.0 - 2.0 g/dL    Total Bilirubin 1.5 (H) 0.2 - 1.3 mg/dL    Alkaline Phosphatase 83 38 - 126 U/L    AST (SGOT) 34 15 - 46 U/L    ALT (SGPT) 37 13 - 69 U/L   Lipid panel   Result Value Ref Range    Total Cholesterol 253 (H) 0 - 199 mg/dL    Triglycerides 80 <150 mg/dL    HDL Cholesterol 84 (H) 40 - 60 mg/dL    VLDL Cholesterol 16 mg/dL    LDL Cholesterol  153 (H) 0 - 99 mg/dL   Hemoglobin A1c   Result Value Ref Range    Hemoglobin A1C 7.20 %   Microalbumin / creatinine urine ratio   Result Value Ref Range    Creatinine, Urine 10.1 Not Estab. mg/dL    Microalbumin, Urine <3.0 Not Estab. ug/mL    Microalbumin/Creatinine Ratio Urine <29.7 0.0 - 30.0 mg/g creat   TSH   Result Value Ref Range    TSH 1.330 0.470 - 4.680 mIU/mL   Vitamin B12   Result Value Ref Range    Vitamin B-12 675 239 - 931 pg/mL         Assessment/Plan   Valerie was seen today for diabetes, hyperlipidemia, hypertension and hypothyroidism.    Diagnoses and all orders for this visit:    Benign essential hypertension  -     CBC and Differential    Mixed hyperlipidemia  -     Comprehensive metabolic panel  -     Lipid panel    Type II diabetes mellitus, uncontrolled  -     Hemoglobin A1c  -     Microalbumin / creatinine urine ratio    Acquired hypothyroidism  -     TSH    Vitamin B12 deficiency  -     Vitamin B12    Vertigo  -     Ambulatory Referral to ENT (Otolaryngology)    Need for influenza vaccination  -     Flu Vaccine High Dose PF 65YR+    Other orders  -     levothyroxine (SYNTHROID) 75 MCG tablet; Take 1 tablet by mouth Daily.  -     pravastatin  (PRAVACHOL) 40 MG tablet; Take 1 tablet by mouth Daily.  -     olmesartan (BENICAR) 40 MG tablet; Take 1 tablet by mouth Daily.  -     furosemide (LASIX) 20 MG tablet; Take 1 tablet by mouth Daily.  -     amLODIPine (NORVASC) 10 MG tablet; Take 1 tablet by mouth Daily.  -     potassium chloride (KLOR-CON) 10 MEQ CR tablet; Take 1 tablet by mouth Daily.  -     metoprolol succinate XL (TOPROL-XL) 100 MG 24 hr tablet; Take 1 tablet by mouth Daily.          Discussion Summary:   1.Benign Essential Hypertension: will continue current medications, low sodium diet, patient to continue to monitor blood pressure.  2. Mixed hyperlipidemia: will continue current medications, cmp and fasting lipid panel reviewed today, total cholesterol 198 , triglycerides 61 , hdl 64 , ldl 122 , patient is advised to follow a low cholesterol diet  3.Diabetes Mellitus: patient is advised to continue all current medications and these have been reviewed today, current fasting sugar 155, hba1c 6.2 , fasting sugar, urine microalbumin and hba1c to be obtained prior to return visit. She had her annual diabetic eye exam  4.hypothyroid: tsh reviewed 0.128 , continue synthroid.  5.   Need for flu vaccine : given Flu Vaccine  in office today  6.  Vertigo :  ENT       Katarzyna Griggs MD

## 2017-09-19 LAB
ALBUMIN SERPL-MCNC: 4.3 G/DL (ref 3.5–5)
ALBUMIN/CREAT UR: 4.8 MG/G CREAT (ref 0–30)
ALBUMIN/GLOB SERPL: 1.3 G/DL (ref 1–2)
ALP SERPL-CCNC: 64 U/L (ref 38–126)
ALT SERPL-CCNC: 50 U/L (ref 13–69)
AST SERPL-CCNC: 39 U/L (ref 15–46)
BASOPHILS # BLD AUTO: 0.05 10*3/MM3 (ref 0–0.2)
BASOPHILS NFR BLD AUTO: 0.7 % (ref 0–2.5)
BILIRUB SERPL-MCNC: 1.3 MG/DL (ref 0.2–1.3)
BUN SERPL-MCNC: 20 MG/DL (ref 7–20)
BUN/CREAT SERPL: 22.2 (ref 7.1–23.5)
CALCIUM SERPL-MCNC: 9.4 MG/DL (ref 8.4–10.2)
CHLORIDE SERPL-SCNC: 102 MMOL/L (ref 98–107)
CHOLEST SERPL-MCNC: 228 MG/DL (ref 0–199)
CO2 SERPL-SCNC: 27 MMOL/L (ref 26–30)
CREAT SERPL-MCNC: 0.9 MG/DL (ref 0.6–1.3)
CREAT UR-MCNC: 111.1 MG/DL
EOSINOPHIL # BLD AUTO: 0.1 10*3/MM3 (ref 0–0.7)
EOSINOPHIL NFR BLD AUTO: 1.4 % (ref 0–7)
ERYTHROCYTE [DISTWIDTH] IN BLOOD BY AUTOMATED COUNT: 13 % (ref 11.5–14.5)
GLOBULIN SER CALC-MCNC: 3.4 GM/DL
GLUCOSE SERPL-MCNC: 213 MG/DL (ref 74–98)
HBA1C MFR BLD: 7.2 %
HCT VFR BLD AUTO: 47.9 % (ref 37–47)
HDLC SERPL-MCNC: 80 MG/DL (ref 40–60)
HGB BLD-MCNC: 15.4 G/DL (ref 12–16)
IMM GRANULOCYTES # BLD: 0.04 10*3/MM3 (ref 0–0.06)
IMM GRANULOCYTES NFR BLD: 0.6 % (ref 0–0.6)
LDLC SERPL CALC-MCNC: 134 MG/DL (ref 0–99)
LYMPHOCYTES # BLD AUTO: 1.77 10*3/MM3 (ref 0.6–3.4)
LYMPHOCYTES NFR BLD AUTO: 24.7 % (ref 10–50)
MCH RBC QN AUTO: 30.6 PG (ref 27–31)
MCHC RBC AUTO-ENTMCNC: 32.2 G/DL (ref 30–37)
MCV RBC AUTO: 95 FL (ref 81–99)
MICROALBUMIN UR-MCNC: 5.3 UG/ML
MONOCYTES # BLD AUTO: 0.46 10*3/MM3 (ref 0–0.9)
MONOCYTES NFR BLD AUTO: 6.4 % (ref 0–12)
NEUTROPHILS # BLD AUTO: 4.74 10*3/MM3 (ref 2–6.9)
NEUTROPHILS NFR BLD AUTO: 66.2 % (ref 37–80)
NRBC BLD AUTO-RTO: 0 /100 WBC (ref 0–0)
PLATELET # BLD AUTO: 142 10*3/MM3 (ref 130–400)
POTASSIUM SERPL-SCNC: 4.3 MMOL/L (ref 3.5–5.1)
PROT SERPL-MCNC: 7.7 G/DL (ref 6.3–8.2)
RBC # BLD AUTO: 5.04 10*6/MM3 (ref 4.2–5.4)
SODIUM SERPL-SCNC: 138 MMOL/L (ref 137–145)
TRIGL SERPL-MCNC: 69 MG/DL
TSH SERPL DL<=0.005 MIU/L-ACNC: 3.33 MIU/ML (ref 0.47–4.68)
VIT B12 SERPL-MCNC: 581 PG/ML (ref 239–931)
VLDLC SERPL CALC-MCNC: 13.8 MG/DL
WBC # BLD AUTO: 7.16 10*3/MM3 (ref 4.8–10.8)

## 2017-10-20 RX ORDER — HYDRALAZINE HYDROCHLORIDE 50 MG/1
TABLET, FILM COATED ORAL
Qty: 90 TABLET | Refills: 1 | Status: SHIPPED | OUTPATIENT
Start: 2017-10-20 | End: 2018-04-15 | Stop reason: SDUPTHER

## 2017-11-29 ENCOUNTER — TELEPHONE (OUTPATIENT)
Dept: FAMILY MEDICINE CLINIC | Facility: CLINIC | Age: 82
End: 2017-11-29

## 2017-11-29 RX ORDER — CEPHALEXIN 500 MG/1
500 CAPSULE ORAL 3 TIMES DAILY
Qty: 30 CAPSULE | Refills: 0 | Status: SHIPPED | OUTPATIENT
Start: 2017-11-29 | End: 2017-12-09

## 2017-12-19 ENCOUNTER — TELEPHONE (OUTPATIENT)
Dept: FAMILY MEDICINE CLINIC | Facility: CLINIC | Age: 82
End: 2017-12-19

## 2018-01-02 ENCOUNTER — TRANSCRIBE ORDERS (OUTPATIENT)
Dept: MRI IMAGING | Facility: HOSPITAL | Age: 83
End: 2018-01-02

## 2018-01-02 DIAGNOSIS — F03.90 SENILE DEMENTIA, UNCOMPLICATED (HCC): Primary | ICD-10-CM

## 2018-01-09 ENCOUNTER — TELEPHONE (OUTPATIENT)
Dept: INTERNAL MEDICINE | Facility: CLINIC | Age: 83
End: 2018-01-09

## 2018-01-09 ENCOUNTER — CLINICAL SUPPORT (OUTPATIENT)
Dept: INTERNAL MEDICINE | Facility: CLINIC | Age: 83
End: 2018-01-09

## 2018-01-09 DIAGNOSIS — N39.0 ACUTE UTI: Primary | ICD-10-CM

## 2018-01-10 ENCOUNTER — TELEPHONE (OUTPATIENT)
Dept: INTERNAL MEDICINE | Facility: CLINIC | Age: 83
End: 2018-01-10

## 2018-01-10 RX ORDER — NITROFURANTOIN 25; 75 MG/1; MG/1
100 CAPSULE ORAL EVERY 12 HOURS SCHEDULED
Qty: 20 CAPSULE | Refills: 0 | Status: SHIPPED | OUTPATIENT
Start: 2018-01-10 | End: 2018-01-24

## 2018-01-10 NOTE — TELEPHONE ENCOUNTER
Patient's daughter called and states that patient is hallucinating very badly and wants Urine culture results. Informed per Dr. Griggs that antibiotics will be sent to help with possible UTI and we will call once results are in.

## 2018-01-12 LAB
BACTERIA UR CULT: NORMAL
BACTERIA UR CULT: NORMAL

## 2018-01-12 NOTE — TELEPHONE ENCOUNTER
I spoke with patients daughter, and informed her of the urine results. She states that the patient has been having a lot of issues with confusion and that she was recently put on Aricept 5 mg and Lexapro 10mg by her Specialist Dr. Asif.     Patients daughter states that has taken 3 days of her Antibiotic Macrobid.    She informed me that 1 hour after taking the Aricept last night the patient have a very bad spell of confusion.    Daughter states that she has 2 calls into Dr. Asif's office, but has not heard anything from them, and is very concerned on what to do.    I spoke with Dr. Pena and he advised that the patient stop the aricept 5 mg, due to the side effect of confusion.    He did advise that the patient continue the lexapro and the macrobid.    Patient's daughter has been advised.

## 2018-01-23 DIAGNOSIS — E53.8 VITAMIN B12 DEFICIENCY: ICD-10-CM

## 2018-01-23 DIAGNOSIS — I10 BENIGN ESSENTIAL HYPERTENSION: ICD-10-CM

## 2018-01-23 DIAGNOSIS — IMO0002 TYPE II DIABETES MELLITUS, UNCONTROLLED: ICD-10-CM

## 2018-01-23 DIAGNOSIS — E03.9 ACQUIRED HYPOTHYROIDISM: ICD-10-CM

## 2018-01-23 DIAGNOSIS — E78.2 MIXED HYPERLIPIDEMIA: ICD-10-CM

## 2018-01-24 ENCOUNTER — OFFICE VISIT (OUTPATIENT)
Dept: INTERNAL MEDICINE | Facility: CLINIC | Age: 83
End: 2018-01-24

## 2018-01-24 VITALS
TEMPERATURE: 97.6 F | HEART RATE: 61 BPM | WEIGHT: 209 LBS | RESPIRATION RATE: 16 BRPM | SYSTOLIC BLOOD PRESSURE: 137 MMHG | DIASTOLIC BLOOD PRESSURE: 58 MMHG | BODY MASS INDEX: 34.78 KG/M2 | OXYGEN SATURATION: 97 %

## 2018-01-24 DIAGNOSIS — E78.00 PURE HYPERCHOLESTEROLEMIA: ICD-10-CM

## 2018-01-24 DIAGNOSIS — E11.65 UNCONTROLLED TYPE 2 DIABETES MELLITUS WITH HYPERGLYCEMIA, WITHOUT LONG-TERM CURRENT USE OF INSULIN (HCC): ICD-10-CM

## 2018-01-24 DIAGNOSIS — E03.8 ADULT ONSET HYPOTHYROIDISM: ICD-10-CM

## 2018-01-24 DIAGNOSIS — R41.3 MEMORY LOSS: ICD-10-CM

## 2018-01-24 DIAGNOSIS — F41.9 ANXIETY: ICD-10-CM

## 2018-01-24 DIAGNOSIS — I10 BENIGN ESSENTIAL HYPERTENSION: Primary | ICD-10-CM

## 2018-01-24 LAB
ALBUMIN SERPL-MCNC: 4 G/DL (ref 3.5–4.7)
ALBUMIN SERPL-MCNC: 4 G/DL (ref 3.5–5)
ALBUMIN/CREAT UR: 28.8 MG/G CREAT (ref 0–30)
ALBUMIN/GLOB SERPL: 1.2 G/DL (ref 1–2)
ALBUMIN/GLOB SERPL: 1.2 {RATIO} (ref 1.2–2.2)
ALP SERPL-CCNC: 58 IU/L (ref 39–117)
ALP SERPL-CCNC: 66 U/L (ref 38–126)
ALT SERPL-CCNC: 25 IU/L (ref 0–32)
ALT SERPL-CCNC: 42 U/L (ref 13–69)
AST SERPL-CCNC: 29 IU/L (ref 0–40)
AST SERPL-CCNC: 36 U/L (ref 15–46)
BASOPHILS # BLD AUTO: 0.07 10*3/MM3 (ref 0–0.2)
BASOPHILS NFR BLD AUTO: 1 % (ref 0–2.5)
BILIRUB SERPL-MCNC: 1.1 MG/DL (ref 0–1.2)
BILIRUB SERPL-MCNC: 1.2 MG/DL (ref 0.2–1.3)
BUN SERPL-MCNC: 10 MG/DL (ref 7–20)
BUN SERPL-MCNC: 10 MG/DL (ref 8–27)
BUN/CREAT SERPL: 11 (ref 12–28)
BUN/CREAT SERPL: 14.3 (ref 7.1–23.5)
CALCIUM SERPL-MCNC: 9.2 MG/DL (ref 8.7–10.3)
CALCIUM SERPL-MCNC: 9.7 MG/DL (ref 8.4–10.2)
CHLORIDE SERPL-SCNC: 100 MMOL/L (ref 98–107)
CHLORIDE SERPL-SCNC: 98 MMOL/L (ref 96–106)
CHOLEST SERPL-MCNC: 236 MG/DL (ref 0–199)
CO2 SERPL-SCNC: 21 MMOL/L (ref 18–29)
CO2 SERPL-SCNC: 28 MMOL/L (ref 26–30)
CREAT SERPL-MCNC: 0.7 MG/DL (ref 0.6–1.3)
CREAT SERPL-MCNC: 0.87 MG/DL (ref 0.57–1)
CREAT UR-MCNC: 91.9 MG/DL
EOSINOPHIL # BLD AUTO: 0.18 10*3/MM3 (ref 0–0.7)
EOSINOPHIL NFR BLD AUTO: 2.5 % (ref 0–7)
ERYTHROCYTE [DISTWIDTH] IN BLOOD BY AUTOMATED COUNT: 12.7 % (ref 11.5–14.5)
ERYTHROCYTE [DISTWIDTH] IN BLOOD BY AUTOMATED COUNT: 13.6 % (ref 12.3–15.4)
GLOBULIN SER CALC-MCNC: 3.3 G/DL (ref 1.5–4.5)
GLOBULIN SER CALC-MCNC: 3.3 GM/DL
GLUCOSE SERPL-MCNC: 271 MG/DL (ref 74–98)
GLUCOSE SERPL-MCNC: 282 MG/DL (ref 65–99)
HBA1C MFR BLD: 8.4 %
HCT VFR BLD AUTO: 46.5 % (ref 34–46.6)
HCT VFR BLD AUTO: 47.8 % (ref 37–47)
HDLC SERPL-MCNC: 78 MG/DL (ref 40–60)
HGB BLD-MCNC: 15.4 G/DL (ref 11.1–15.9)
HGB BLD-MCNC: 15.9 G/DL (ref 12–16)
IMM GRANULOCYTES # BLD: 0.06 10*3/MM3 (ref 0–0.06)
IMM GRANULOCYTES NFR BLD: 0.8 % (ref 0–0.6)
LDLC SERPL CALC-MCNC: 142 MG/DL (ref 0–99)
LYMPHOCYTES # BLD AUTO: 1.88 10*3/MM3 (ref 0.6–3.4)
LYMPHOCYTES NFR BLD AUTO: 26.3 % (ref 10–50)
MCH RBC QN AUTO: 30.8 PG (ref 26.6–33)
MCH RBC QN AUTO: 30.9 PG (ref 27–31)
MCHC RBC AUTO-ENTMCNC: 33.1 G/DL (ref 31.5–35.7)
MCHC RBC AUTO-ENTMCNC: 33.3 G/DL (ref 30–37)
MCV RBC AUTO: 92.8 FL (ref 81–99)
MCV RBC AUTO: 93 FL (ref 79–97)
MICROALBUMIN UR-MCNC: 26.5 UG/ML
MONOCYTES # BLD AUTO: 0.45 10*3/MM3 (ref 0–0.9)
MONOCYTES NFR BLD AUTO: 6.3 % (ref 0–12)
NEUTROPHILS # BLD AUTO: 4.51 10*3/MM3 (ref 2–6.9)
NEUTROPHILS NFR BLD AUTO: 63.1 % (ref 37–80)
NRBC BLD AUTO-RTO: 0 /100 WBC (ref 0–0)
PLATELET # BLD AUTO: 168 X10E3/UL (ref 150–379)
PLATELET # BLD AUTO: 169 10*3/MM3 (ref 130–400)
POTASSIUM SERPL-SCNC: 4.3 MMOL/L (ref 3.5–5.1)
POTASSIUM SERPL-SCNC: 4.4 MMOL/L (ref 3.5–5.2)
PROT SERPL-MCNC: 7.3 G/DL (ref 6.3–8.2)
PROT SERPL-MCNC: 7.3 G/DL (ref 6–8.5)
RBC # BLD AUTO: 5 X10E6/UL (ref 3.77–5.28)
RBC # BLD AUTO: 5.15 10*6/MM3 (ref 4.2–5.4)
SODIUM SERPL-SCNC: 137 MMOL/L (ref 137–145)
SODIUM SERPL-SCNC: 140 MMOL/L (ref 134–144)
TRIGL SERPL-MCNC: 79 MG/DL
TSH SERPL DL<=0.005 MIU/L-ACNC: 7.23 UIU/ML (ref 0.45–4.5)
TSH SERPL DL<=0.005 MIU/L-ACNC: 7.42 MIU/ML (ref 0.47–4.68)
VIT B12 SERPL-MCNC: 656 PG/ML (ref 232–1245)
VIT B12 SERPL-MCNC: 658 PG/ML (ref 239–931)
VLDLC SERPL CALC-MCNC: 15.8 MG/DL
WBC # BLD AUTO: 7.15 10*3/MM3 (ref 4.8–10.8)
WBC # BLD AUTO: 7.9 X10E3/UL (ref 3.4–10.8)

## 2018-01-24 PROCEDURE — 99214 OFFICE O/P EST MOD 30 MIN: CPT | Performed by: INTERNAL MEDICINE

## 2018-01-24 RX ORDER — TRAZODONE HYDROCHLORIDE 50 MG/1
50 TABLET ORAL NIGHTLY
COMMUNITY
End: 2018-05-01 | Stop reason: SDUPTHER

## 2018-01-24 RX ORDER — ESCITALOPRAM OXALATE 10 MG/1
10 TABLET ORAL DAILY
COMMUNITY
End: 2018-05-29 | Stop reason: SDUPTHER

## 2018-01-24 RX ORDER — LEVOTHYROXINE SODIUM 88 UG/1
88 TABLET ORAL DAILY
Qty: 30 TABLET | Refills: 0 | Status: SHIPPED | OUTPATIENT
Start: 2018-01-24 | End: 2018-02-27 | Stop reason: SDUPTHER

## 2018-01-24 RX ORDER — GLIMEPIRIDE 4 MG/1
4 TABLET ORAL DAILY
Qty: 30 TABLET | Refills: 6 | Status: SHIPPED | OUTPATIENT
Start: 2018-01-24 | End: 2018-05-01 | Stop reason: SDUPTHER

## 2018-01-24 NOTE — PROGRESS NOTES
Subjective   Valerie Davila is a 82 y.o. female.     Chief Complaint   Patient presents with   • Diabetes   • Hyperlipidemia   • Hypertension   • Memory Loss   • Anxiety       History of Present Illness   Patient is here to follow up  on the  blood pressure,sugar , thyroid  and cholesterol  The patient  is taking the blood pressure  medications as prescribed and  has had no side effects.   The patient  also needs refills on  medications . The patient  is also here to follow up  on  the  cholesterol  and  is trying to follow a diet. The patient   had lab work done and would like the labs to be reviewed today. Patient also needs the medications to be refilled today .  Patient is also to follow-up on her memory issues .  she saw dr casper moura  -neurology on 12- and she was put on aricept 5mg qd  And she got confused , the aricept was stopped and the confusion improved but she is yet confused at night , she had labs and is due for mri  At [facility] this Friday , she has been having auditory hallucinations with a child crying and  She keeps wanting to 'go back to her house ' . She was started on lexapro for anxiety  At her last [facility] appointment on 12- , she lives at home and has caregivers , she was also put on trazodone 50 mg qhs for her insomnia by [facility] last weekand she has taken one dose , she has gained nearly 11 lbs in 6-7 months    The following portions of the patient's history were reviewed and updated as appropriate: allergies, current medications, past family history, past medical history, past social history, past surgical history and problem list.    Review of Systems   Constitutional: Negative for appetite change, fatigue and fever.   HENT: Negative for congestion, ear discharge, ear pain, sinus pressure and sore throat.    Eyes: Negative for pain and discharge.   Respiratory: Negative for cough, chest tightness, shortness of breath and wheezing.    Cardiovascular: Negative for chest pain,  palpitations and leg swelling.   Gastrointestinal: Negative for abdominal pain, blood in stool, constipation, diarrhea and nausea.   Endocrine: Negative for cold intolerance and heat intolerance.   Genitourinary: Negative for dysuria, flank pain and frequency.   Musculoskeletal: Negative for back pain and joint swelling.   Skin: Negative for color change.   Allergic/Immunologic: Negative for environmental allergies and food allergies.   Neurological: Negative for dizziness, weakness, numbness and headaches.   Hematological: Negative for adenopathy. Does not bruise/bleed easily.   Psychiatric/Behavioral: Positive for decreased concentration. Negative for behavioral problems. The patient is nervous/anxious.          Current Outpatient Prescriptions:   •  amLODIPine (NORVASC) 10 MG tablet, Take 1 tablet by mouth Daily., Disp: 90 tablet, Rfl: 3  •  escitalopram (LEXAPRO) 10 MG tablet, Take 10 mg by mouth Daily., Disp: , Rfl:   •  furosemide (LASIX) 20 MG tablet, Take 1 tablet by mouth Daily., Disp: 90 tablet, Rfl: 3  •  hydrALAZINE (APRESOLINE) 50 MG tablet, TAKE ONE TABLET BY MOUTH DAILY, Disp: 90 tablet, Rfl: 1  •  KLOR-CON 10 MEQ CR tablet, TAKE ONE TABLET BY MOUTH DAILY, Disp: 90 tablet, Rfl: 2  •  levothyroxine (SYNTHROID, LEVOTHROID) 88 MCG tablet, Take 1 tablet by mouth Daily., Disp: 30 tablet, Rfl: 0  •  metoprolol succinate XL (TOPROL-XL) 100 MG 24 hr tablet, Take 1 tablet by mouth Daily., Disp: 90 tablet, Rfl: 3  •  olmesartan (BENICAR) 40 MG tablet, Take 1 tablet by mouth Daily., Disp: 90 tablet, Rfl: 3  •  ondansetron (ZOFRAN) 4 MG tablet, Take 1 tablet by mouth Every 6 (Six) Hours As Needed for Nausea or Vomiting., Disp: 15 tablet, Rfl: 0  •  potassium chloride (KLOR-CON) 10 MEQ CR tablet, Take 1 tablet by mouth Daily., Disp: 90 tablet, Rfl: 3  •  pravastatin (PRAVACHOL) 40 MG tablet, Take 1 tablet by mouth Daily., Disp: 90 tablet, Rfl: 3  •  timolol (BETIMOL) 0.5 % ophthalmic solution, Apply  to eye.,  Disp: , Rfl:   •  travoprost, TA free, (TRAVATAN Z) 0.004 % solution ophthalmic solution, Apply  to eye., Disp: , Rfl:   •  traZODone (DESYREL) 50 MG tablet, Take 50 mg by mouth Every Night. MAY INCREASE TO TWO TABLETS NIGHTLY PRN, Disp: , Rfl:   •  glimepiride (AMARYL) 4 MG tablet, Take 1 tablet by mouth Daily., Disp: 30 tablet, Rfl: 6    Current Facility-Administered Medications:   •  cyanocobalamin injection 1,000 mcg, 1,000 mcg, Intramuscular, Q28 Days, Katarzyna Griggs MD, 1,000 mcg at 10/03/16 1518    Objective     Blood pressure 137/58, pulse 61, temperature 97.6 °F (36.4 °C), temperature source Oral, resp. rate 16, weight 94.8 kg (209 lb), SpO2 97 %.    Physical Exam   Constitutional: She is oriented to person, place, and time. She appears well-developed and well-nourished. No distress.   HENT:   Head: Normocephalic and atraumatic.   Right Ear: External ear normal.   Left Ear: External ear normal.   Nose: Nose normal.   Mouth/Throat: Oropharynx is clear and moist.   Eyes: Conjunctivae and EOM are normal. Pupils are equal, round, and reactive to light.   Neck: Neck supple. No thyromegaly present.   Cardiovascular: Normal rate, regular rhythm and normal heart sounds.    Pulmonary/Chest: Effort normal and breath sounds normal. No respiratory distress.   Abdominal: Soft. Bowel sounds are normal. She exhibits no distension. There is no tenderness. There is no rebound.   Musculoskeletal: Normal range of motion. She exhibits no edema.   Lymphadenopathy:     She has no cervical adenopathy.   Neurological: She is alert and oriented to person, place, and time.   No gross motor or sensory deficits   Skin: Skin is warm. She is not diaphoretic.   Psychiatric: She has a normal mood and affect.   Nursing note and vitals reviewed.      Results for orders placed or performed in visit on 01/23/18   Comprehensive metabolic panel   Result Value Ref Range    Glucose 271 (H) 74 - 98 mg/dL    BUN 10 7 - 20 mg/dL    Creatinine 0.70  0.60 - 1.30 mg/dL    eGFR Non African Am 80 >60 mL/min/1.73    eGFR African Am 97 >60 mL/min/1.73    BUN/Creatinine Ratio 14.3 7.1 - 23.5    Sodium 137 137 - 145 mmol/L    Potassium 4.3 3.5 - 5.1 mmol/L    Chloride 100 98 - 107 mmol/L    Total CO2 28.0 26.0 - 30.0 mmol/L    Calcium 9.7 8.4 - 10.2 mg/dL    Total Protein 7.3 6.3 - 8.2 g/dL    Albumin 4.00 3.50 - 5.00 g/dL    Globulin 3.3 gm/dL    A/G Ratio 1.2 1.0 - 2.0 g/dL    Total Bilirubin 1.2 0.2 - 1.3 mg/dL    Alkaline Phosphatase 66 38 - 126 U/L    AST (SGOT) 36 15 - 46 U/L    ALT (SGPT) 42 13 - 69 U/L   Lipid panel   Result Value Ref Range    Total Cholesterol 236 (H) 0 - 199 mg/dL    Triglycerides 79 <150 mg/dL    HDL Cholesterol 78 (H) 40 - 60 mg/dL    VLDL Cholesterol 15.8 mg/dL    LDL Cholesterol  142 (H) 0 - 99 mg/dL   Hemoglobin A1c   Result Value Ref Range    Hemoglobin A1C 8.40 %   Microalbumin / creatinine urine ratio   Result Value Ref Range    Creatinine, Urine 91.9 Not Estab. mg/dL    Microalbumin, Urine 26.5 Not Estab. ug/mL    Microalbumin/Creatinine Ratio 28.8 0.0 - 30.0 mg/g creat   TSH   Result Value Ref Range    TSH 7.420 (H) 0.470 - 4.680 mIU/mL   Vitamin B12   Result Value Ref Range    Vitamin B-12 658 239 - 931 pg/mL   CBC and Differential   Result Value Ref Range    WBC 7.15 4.80 - 10.80 10*3/mm3    RBC 5.15 4.20 - 5.40 10*6/mm3    Hemoglobin 15.9 12.0 - 16.0 g/dL    Hematocrit 47.8 (H) 37.0 - 47.0 %    MCV 92.8 81.0 - 99.0 fL    MCH 30.9 27.0 - 31.0 pg    MCHC 33.3 30.0 - 37.0 g/dL    RDW 12.7 11.5 - 14.5 %    Platelets 169 130 - 400 10*3/mm3    Neutrophil Rel % 63.1 37.0 - 80.0 %    Lymphocyte Rel % 26.3 10.0 - 50.0 %    Monocyte Rel % 6.3 0.0 - 12.0 %    Eosinophil Rel % 2.5 0.0 - 7.0 %    Basophil Rel % 1.0 0.0 - 2.5 %    Neutrophils Absolute 4.51 2.00 - 6.90 10*3/mm3    Lymphocytes Absolute 1.88 0.60 - 3.40 10*3/mm3    Monocytes Absolute 0.45 0.00 - 0.90 10*3/mm3    Eosinophils Absolute 0.18 0.00 - 0.70 10*3/mm3    Basophils Absolute  0.07 0.00 - 0.20 10*3/mm3    Immature Granulocyte Rel % 0.8 (H) 0.0 - 0.6 %    Immature Grans Absolute 0.06 0.00 - 0.06 10*3/mm3    nRBC 0.0 0.0 - 0.0 /100 WBC         Assessment/Plan   Valerie was seen today for diabetes, hyperlipidemia, hypertension, memory loss and anxiety.    Diagnoses and all orders for this visit:    Benign essential hypertension    Pure hypercholesterolemia    Uncontrolled type 2 diabetes mellitus with hyperglycemia, without long-term current use of insulin    Adult onset hypothyroidism    Anxiety    Memory loss    Other orders  -     glimepiride (AMARYL) 4 MG tablet; Take 1 tablet by mouth Daily.      plan:  1.Benign Essential Hypertension: will  continue current medications, low sodium diet, patient to continue to monitor  blood pressure  2.Diabetes Mellitus: patient  is advised  to continue all current medications and these have been reviewed today , will increase to Amaryl 4 mg daily, current hba1c 8.4  ,  fasting sugar, urine microalbumin and hba1c to be obtained prior to return visit.  3.  Mixed hyperlipidemia: reviewed fasting CMP and lipid panel.  Diet and excise counseled  4.hypothyroid: tsh reviewed, continue synthroid  5.  Anxiety: Will continue Lexapro but neurology  6.memory loss: Per neurology at [facility]         Katarzyna Griggs MD

## 2018-01-26 ENCOUNTER — HOSPITAL ENCOUNTER (OUTPATIENT)
Dept: MRI IMAGING | Facility: HOSPITAL | Age: 83
Discharge: HOME | End: 2018-01-26
Admitting: PSYCHIATRY & NEUROLOGY

## 2018-01-26 DIAGNOSIS — F03.90 SENILE DEMENTIA, UNCOMPLICATED (HCC): ICD-10-CM

## 2018-01-26 PROCEDURE — A9577 INJ MULTIHANCE: HCPCS | Performed by: PSYCHIATRY & NEUROLOGY

## 2018-01-26 PROCEDURE — 0 GADOBENATE DIMEGLUMINE 529 MG/ML SOLUTION: Performed by: PSYCHIATRY & NEUROLOGY

## 2018-01-26 PROCEDURE — 70553 MRI BRAIN STEM W/O & W/DYE: CPT

## 2018-01-26 RX ADMIN — GADOBENATE DIMEGLUMINE 15 ML: 529 INJECTION, SOLUTION INTRAVENOUS at 09:11

## 2018-02-07 ENCOUNTER — TELEPHONE (OUTPATIENT)
Dept: INTERNAL MEDICINE | Facility: CLINIC | Age: 83
End: 2018-02-07

## 2018-02-07 DIAGNOSIS — R60.0 LOCALIZED EDEMA: Primary | ICD-10-CM

## 2018-02-07 NOTE — TELEPHONE ENCOUNTER
Patient daughter called and states patient is having swelling in her legs from the knee down after increasing thyroid medication and adding glimerperide and wants to know what they should be doing.

## 2018-02-07 NOTE — TELEPHONE ENCOUNTER
Daughter called back with blood pressure, it was 129/67 heartrate 63. Per Dr. Griggs, informed daughter bp was okay.

## 2018-02-07 NOTE — TELEPHONE ENCOUNTER
Spoke to patient's daughter. Informed per Dr. Griggs to call us back with blood pressure and heartrate, and a referral will be put in for Cardiology. Patient's daughter voiced understanding.

## 2018-02-20 RX ORDER — LEVOTHYROXINE SODIUM 88 MCG
TABLET ORAL
Qty: 30 TABLET | Refills: 0 | OUTPATIENT
Start: 2018-02-20

## 2018-02-26 DIAGNOSIS — R79.9 ABNORMAL BLOOD CHEMISTRY: Primary | ICD-10-CM

## 2018-02-26 DIAGNOSIS — E07.9 DISORDER OF THYROID: ICD-10-CM

## 2018-02-26 RX ORDER — LEVOTHYROXINE SODIUM 88 UG/1
88 TABLET ORAL DAILY
Qty: 30 TABLET | Refills: 0 | Status: CANCELLED | OUTPATIENT
Start: 2018-02-26

## 2018-02-26 NOTE — TELEPHONE ENCOUNTER
HAD TSH LAB THIS A.M., STILL SHOWS NEEDS TO BE COLLECTED. TARAS WETZEL CALLED REQUESTING A REFILL OF PATIENT'S SYNTHROID, PATIENT COMPLETELY OUT. REQUESTING CALL BACK.

## 2018-02-27 LAB — TSH SERPL DL<=0.005 MIU/L-ACNC: 2.3 MIU/ML (ref 0.47–4.68)

## 2018-02-27 RX ORDER — LEVOTHYROXINE SODIUM 88 UG/1
88 TABLET ORAL DAILY
Qty: 90 TABLET | Refills: 2 | Status: SHIPPED | OUTPATIENT
Start: 2018-02-27 | End: 2018-05-29 | Stop reason: SDUPTHER

## 2018-04-16 RX ORDER — HYDRALAZINE HYDROCHLORIDE 50 MG/1
TABLET, FILM COATED ORAL
Qty: 90 TABLET | Refills: 0 | Status: SHIPPED | OUTPATIENT
Start: 2018-04-16 | End: 2018-05-01 | Stop reason: SDUPTHER

## 2018-05-01 ENCOUNTER — OFFICE VISIT (OUTPATIENT)
Dept: INTERNAL MEDICINE | Facility: CLINIC | Age: 83
End: 2018-05-01

## 2018-05-01 VITALS
SYSTOLIC BLOOD PRESSURE: 149 MMHG | TEMPERATURE: 98.2 F | BODY MASS INDEX: 35.28 KG/M2 | OXYGEN SATURATION: 100 % | HEART RATE: 57 BPM | RESPIRATION RATE: 16 BRPM | WEIGHT: 212 LBS | DIASTOLIC BLOOD PRESSURE: 50 MMHG

## 2018-05-01 DIAGNOSIS — G30.1 LATE ONSET ALZHEIMER'S DISEASE WITHOUT BEHAVIORAL DISTURBANCE (HCC): ICD-10-CM

## 2018-05-01 DIAGNOSIS — K59.1 FUNCTIONAL DIARRHEA: ICD-10-CM

## 2018-05-01 DIAGNOSIS — E53.8 VITAMIN B12 DEFICIENCY: ICD-10-CM

## 2018-05-01 DIAGNOSIS — F51.04 INSOMNIA, PSYCHOPHYSIOLOGICAL: ICD-10-CM

## 2018-05-01 DIAGNOSIS — I10 BENIGN ESSENTIAL HYPERTENSION: Primary | ICD-10-CM

## 2018-05-01 DIAGNOSIS — R60.0 LOCALIZED EDEMA: ICD-10-CM

## 2018-05-01 DIAGNOSIS — E11.65 UNCONTROLLED TYPE 2 DIABETES MELLITUS WITH HYPERGLYCEMIA, WITHOUT LONG-TERM CURRENT USE OF INSULIN (HCC): ICD-10-CM

## 2018-05-01 DIAGNOSIS — E78.2 MIXED HYPERLIPIDEMIA: ICD-10-CM

## 2018-05-01 DIAGNOSIS — F02.80 LATE ONSET ALZHEIMER'S DISEASE WITHOUT BEHAVIORAL DISTURBANCE (HCC): ICD-10-CM

## 2018-05-01 DIAGNOSIS — E03.8 ADULT ONSET HYPOTHYROIDISM: ICD-10-CM

## 2018-05-01 LAB
ALBUMIN SERPL-MCNC: 4.3 G/DL (ref 3.5–5)
ALBUMIN/GLOB SERPL: 1.2 G/DL (ref 1–2)
ALP SERPL-CCNC: 60 U/L (ref 38–126)
ALT SERPL-CCNC: 36 U/L (ref 13–69)
AST SERPL-CCNC: 32 U/L (ref 15–46)
BASOPHILS # BLD AUTO: 0.05 10*3/MM3 (ref 0–0.2)
BASOPHILS NFR BLD AUTO: 0.6 % (ref 0–2.5)
BILIRUB SERPL-MCNC: 0.9 MG/DL (ref 0.2–1.3)
BUN SERPL-MCNC: 22 MG/DL (ref 7–20)
BUN/CREAT SERPL: 20 (ref 7.1–23.5)
CALCIUM SERPL-MCNC: 9.9 MG/DL (ref 8.4–10.2)
CHLORIDE SERPL-SCNC: 102 MMOL/L (ref 98–107)
CHOLEST SERPL-MCNC: 247 MG/DL (ref 0–199)
CO2 SERPL-SCNC: 27 MMOL/L (ref 26–30)
CREAT SERPL-MCNC: 1.1 MG/DL (ref 0.6–1.3)
EOSINOPHIL # BLD AUTO: 0.08 10*3/MM3 (ref 0–0.7)
EOSINOPHIL NFR BLD AUTO: 1 % (ref 0–7)
ERYTHROCYTE [DISTWIDTH] IN BLOOD BY AUTOMATED COUNT: 12.9 % (ref 11.5–14.5)
GFR SERPLBLD CREATININE-BSD FMLA CKD-EPI: 48 ML/MIN/1.73
GFR SERPLBLD CREATININE-BSD FMLA CKD-EPI: 58 ML/MIN/1.73
GLOBULIN SER CALC-MCNC: 3.7 GM/DL
GLUCOSE SERPL-MCNC: 163 MG/DL (ref 74–98)
HBA1C MFR BLD: 5.9 %
HCT VFR BLD AUTO: 45.4 % (ref 37–47)
HDLC SERPL-MCNC: 84 MG/DL (ref 40–60)
HGB BLD-MCNC: 14.6 G/DL (ref 12–16)
IMM GRANULOCYTES # BLD: 0.05 10*3/MM3 (ref 0–0.06)
IMM GRANULOCYTES NFR BLD: 0.6 % (ref 0–0.6)
LDLC SERPL CALC-MCNC: 150 MG/DL (ref 0–99)
LYMPHOCYTES # BLD AUTO: 1.88 10*3/MM3 (ref 0.6–3.4)
LYMPHOCYTES NFR BLD AUTO: 24.2 % (ref 10–50)
MCH RBC QN AUTO: 31.1 PG (ref 27–31)
MCHC RBC AUTO-ENTMCNC: 32.2 G/DL (ref 30–37)
MCV RBC AUTO: 96.8 FL (ref 81–99)
MONOCYTES # BLD AUTO: 0.44 10*3/MM3 (ref 0–0.9)
MONOCYTES NFR BLD AUTO: 5.7 % (ref 0–12)
NEUTROPHILS # BLD AUTO: 5.26 10*3/MM3 (ref 2–6.9)
NEUTROPHILS NFR BLD AUTO: 67.9 % (ref 37–80)
NRBC BLD AUTO-RTO: 0 /100 WBC (ref 0–0)
PLATELET # BLD AUTO: 149 10*3/MM3 (ref 130–400)
POTASSIUM SERPL-SCNC: 5.2 MMOL/L (ref 3.5–5.1)
PROT SERPL-MCNC: 8 G/DL (ref 6.3–8.2)
RBC # BLD AUTO: 4.69 10*6/MM3 (ref 4.2–5.4)
SODIUM SERPL-SCNC: 141 MMOL/L (ref 137–145)
TRIGL SERPL-MCNC: 66 MG/DL
VIT B12 SERPL-MCNC: 477 PG/ML (ref 239–931)
VLDLC SERPL CALC-MCNC: 13.2 MG/DL
WBC # BLD AUTO: 7.76 10*3/MM3 (ref 4.8–10.8)

## 2018-05-01 PROCEDURE — 99214 OFFICE O/P EST MOD 30 MIN: CPT | Performed by: INTERNAL MEDICINE

## 2018-05-01 RX ORDER — SPIRONOLACTONE AND HYDROCHLOROTHIAZIDE 25; 25 MG/1; MG/1
1 TABLET ORAL DAILY
COMMUNITY
End: 2019-08-13

## 2018-05-01 RX ORDER — GLIMEPIRIDE 4 MG/1
4 TABLET ORAL DAILY
Qty: 30 TABLET | Refills: 6 | Status: SHIPPED | OUTPATIENT
Start: 2018-05-01 | End: 2018-05-29 | Stop reason: SDUPTHER

## 2018-05-01 RX ORDER — CARVEDILOL 25 MG/1
25 TABLET ORAL 2 TIMES DAILY WITH MEALS
COMMUNITY
End: 2019-04-24 | Stop reason: SDUPTHER

## 2018-05-01 RX ORDER — TRAZODONE HYDROCHLORIDE 100 MG/1
100 TABLET ORAL NIGHTLY
Qty: 30 TABLET | Refills: 11 | Status: SHIPPED | OUTPATIENT
Start: 2018-05-01 | End: 2018-08-27 | Stop reason: SDUPTHER

## 2018-05-01 RX ORDER — HYDRALAZINE HYDROCHLORIDE 50 MG/1
50 TABLET, FILM COATED ORAL DAILY
Qty: 90 TABLET | Refills: 3 | Status: SHIPPED | OUTPATIENT
Start: 2018-05-01 | End: 2019-04-24

## 2018-05-01 NOTE — PROGRESS NOTES
Subjective   Valerie Davila is a 82 y.o. female.     Chief Complaint   Patient presents with   • Diabetes   • Hyperlipidemia   • Hypertension   • Diarrhea   • Memory Loss   • Leg Swelling   • Insomnia       History of Present Illness   Patient is here to follow up  on the  blood pressure and cholesterol  The patient  is taking the blood pressure  medications as prescribed and  has had no side effects.   The patient  also needs refills on  medications . The patient  is also here to follow up  on  the  cholesterol  and  Sugar  is trying to follow a diet. The patient  is due to get lab work done .  she states she yet has diarrhea , she also complains of leg swelling and was seen by cardiology and she declines further invasive workup ,  She also was seen by [facility] neurology for her dementia and had mri done , she is on namenda , she has caregivers at home ,  She has not been sleeping and has been on trazodone  Hypertension   Pertinent negatives include no chest pain, headaches, palpitations or shortness of breath.   Hyperlipidemia   Pertinent negatives include no chest pain or shortness of breath  Diabetes   Patient has type 2 diabetes mellitus.  disease course has been stable. Pertinent negatives for diabetes include no chest pain, no fatigue and no weakness. Current diabetic treatment includes diet and oral medications, angiotensin II receptor blocker is being taken. eye exam is current.       The following portions of the patient's history were reviewed and updated as appropriate: allergies, current medications, past family history, past medical history, past social history, past surgical history and problem list.    Review of Systems   Constitutional: Negative for appetite change, fatigue and fever.   HENT: Negative for congestion, ear discharge, ear pain, sinus pressure and sore throat.    Eyes: Negative for pain and discharge.   Respiratory: Negative for cough, chest tightness, shortness of breath and wheezing.     Cardiovascular: Positive for leg swelling. Negative for chest pain and palpitations.   Gastrointestinal: Positive for diarrhea. Negative for abdominal pain, blood in stool, constipation and nausea.   Endocrine: Negative for cold intolerance and heat intolerance.   Genitourinary: Negative for dysuria, flank pain and frequency.   Musculoskeletal: Negative for back pain and joint swelling.   Skin: Negative for color change.   Allergic/Immunologic: Negative for environmental allergies and food allergies.   Neurological: Negative for dizziness, weakness, numbness and headaches.   Hematological: Negative for adenopathy. Does not bruise/bleed easily.   Psychiatric/Behavioral: Positive for decreased concentration and sleep disturbance. Negative for behavioral problems and dysphoric mood. The patient is not nervous/anxious.          Current Outpatient Prescriptions:   •  carvedilol (COREG) 25 MG tablet, Take 25 mg by mouth 2 (Two) Times a Day With Meals., Disp: , Rfl:   •  escitalopram (LEXAPRO) 10 MG tablet, Take 10 mg by mouth Daily., Disp: , Rfl:   •  furosemide (LASIX) 20 MG tablet, Take 1 tablet by mouth Daily., Disp: 90 tablet, Rfl: 3  •  glimepiride (AMARYL) 4 MG tablet, Take 1 tablet by mouth Daily., Disp: 30 tablet, Rfl: 6  •  hydrALAZINE (APRESOLINE) 50 MG tablet, Take 1 tablet by mouth Daily., Disp: 90 tablet, Rfl: 3  •  levothyroxine (SYNTHROID, LEVOTHROID) 88 MCG tablet, Take 1 tablet by mouth Daily., Disp: 90 tablet, Rfl: 2  •  memantine (NAMENDA TITRATION PACK) 5 (28)-10 (21) MG tablet pack, Follow package directions., Disp: , Rfl: 12  •  olmesartan (BENICAR) 40 MG tablet, Take 1 tablet by mouth Daily., Disp: 90 tablet, Rfl: 3  •  potassium chloride (KLOR-CON) 10 MEQ CR tablet, Take 1 tablet by mouth Daily., Disp: 90 tablet, Rfl: 3  •  pravastatin (PRAVACHOL) 40 MG tablet, Take 1 tablet by mouth Daily., Disp: 90 tablet, Rfl: 3  •  spironolactone-hydrochlorothiazide (ALDACTAZIDE) 25-25 MG tablet, Take 1  tablet by mouth Daily., Disp: , Rfl:   •  timolol (BETIMOL) 0.5 % ophthalmic solution, Apply  to eye., Disp: , Rfl:   •  travoprost, TA free, (TRAVATAN Z) 0.004 % solution ophthalmic solution, Apply  to eye., Disp: , Rfl:   •  traZODone (DESYREL) 100 MG tablet, Take 1 tablet by mouth Every Night., Disp: 30 tablet, Rfl: 11    Current Facility-Administered Medications:   •  cyanocobalamin injection 1,000 mcg, 1,000 mcg, Intramuscular, Q28 Days, Katarzyna Griggs MD, 1,000 mcg at 10/03/16 1518    Objective     Blood pressure 149/50, pulse 57, temperature 98.2 °F (36.8 °C), temperature source Oral, resp. rate 16, weight 96.2 kg (212 lb), SpO2 100 %.    Physical Exam   Constitutional: She appears well-developed and well-nourished. No distress.   HENT:   Head: Normocephalic and atraumatic.   Right Ear: External ear normal.   Left Ear: External ear normal.   Nose: Nose normal.   Mouth/Throat: Oropharynx is clear and moist.   Eyes: Conjunctivae and EOM are normal. Pupils are equal, round, and reactive to light.   Neck: Neck supple. No thyromegaly present.   Cardiovascular: Normal rate, regular rhythm and normal heart sounds.    Pulmonary/Chest: Effort normal and breath sounds normal. No respiratory distress.   Abdominal: Soft. Bowel sounds are normal. She exhibits no distension. There is no tenderness. There is no rebound.   Musculoskeletal: She exhibits edema.   Lymphadenopathy:     She has no cervical adenopathy.   Neurological: She is alert.   No gross motor or sensory deficits   Skin: Skin is warm. She is not diaphoretic.   Psychiatric: She has a normal mood and affect.   Nursing note and vitals reviewed.      Results for orders placed or performed in visit on 02/26/18   TSH   Result Value Ref Range    TSH 2.300 0.470 - 4.680 mIU/mL         Assessment/Plan   Valerie was seen today for diabetes, hyperlipidemia, hypertension, diarrhea, memory loss, leg swelling and insomnia.    Diagnoses and all orders for this  visit:    Benign essential hypertension    Mixed hyperlipidemia  -     CBC & Differential  -     Comprehensive Metabolic Panel  -     Lipid Panel    Uncontrolled type 2 diabetes mellitus with hyperglycemia, without long-term current use of insulin  -     Hemoglobin A1c    Adult onset hypothyroidism    Vitamin B12 deficiency  -     Vitamin B12    Late onset Alzheimer's disease without behavioral disturbance    Localized edema    Functional diarrhea    Insomnia, psychophysiological    Other orders  -     glimepiride (AMARYL) 4 MG tablet; Take 1 tablet by mouth Daily.  -     hydrALAZINE (APRESOLINE) 50 MG tablet; Take 1 tablet by mouth Daily.  -     traZODone (DESYREL) 100 MG tablet; Take 1 tablet by mouth Every Night.        Plan:  Plan:  1.  Benign essential hypertension: Will continue current medication, low-sodium diet advised  2.mixed hyperlipidemia: We will obtain fasting CMP and lipid panel.  Diet and excise counseled, will continue current medication  3.  Diabetes mellitus    : will obtain cmp and  hba1c  , diet and exercise counseled ,  Last hba1c  8.4   4.  Hypothyroid : reviewed tsh , will continue levothyroxine   5.Diarrhea : will get labs , refer to GI  6. Edema : on diuretic ,  low-sodium diet advised  7. alzhemiers dementia : on namenda per [facility] neurology  8. Insomnia : increase to trazodone 100 mg po qhs       Katarzyna Griggs MD

## 2018-05-29 ENCOUNTER — OFFICE VISIT (OUTPATIENT)
Dept: INTERNAL MEDICINE | Facility: CLINIC | Age: 83
End: 2018-05-29

## 2018-05-29 VITALS
HEART RATE: 65 BPM | OXYGEN SATURATION: 98 % | SYSTOLIC BLOOD PRESSURE: 139 MMHG | BODY MASS INDEX: 35.74 KG/M2 | TEMPERATURE: 98.2 F | WEIGHT: 214.75 LBS | RESPIRATION RATE: 16 BRPM | DIASTOLIC BLOOD PRESSURE: 76 MMHG

## 2018-05-29 DIAGNOSIS — E78.2 MIXED HYPERLIPIDEMIA: ICD-10-CM

## 2018-05-29 DIAGNOSIS — E11.9 CONTROLLED TYPE 2 DIABETES MELLITUS WITHOUT COMPLICATION, WITHOUT LONG-TERM CURRENT USE OF INSULIN (HCC): ICD-10-CM

## 2018-05-29 DIAGNOSIS — E03.8 ADULT ONSET HYPOTHYROIDISM: ICD-10-CM

## 2018-05-29 DIAGNOSIS — I10 BENIGN ESSENTIAL HYPERTENSION: Primary | ICD-10-CM

## 2018-05-29 DIAGNOSIS — F41.9 ANXIETY: ICD-10-CM

## 2018-05-29 PROCEDURE — 99214 OFFICE O/P EST MOD 30 MIN: CPT | Performed by: INTERNAL MEDICINE

## 2018-05-29 RX ORDER — ESCITALOPRAM OXALATE 20 MG/1
20 TABLET ORAL DAILY
Qty: 90 TABLET | Refills: 3 | Status: SHIPPED | OUTPATIENT
Start: 2018-05-29 | End: 2019-04-24 | Stop reason: SDUPTHER

## 2018-05-29 RX ORDER — LEVOTHYROXINE SODIUM 88 UG/1
88 TABLET ORAL DAILY
Qty: 90 TABLET | Refills: 3 | Status: SHIPPED | OUTPATIENT
Start: 2018-05-29 | End: 2019-04-24 | Stop reason: SDUPTHER

## 2018-05-29 RX ORDER — GLIMEPIRIDE 4 MG/1
4 TABLET ORAL DAILY
Qty: 90 TABLET | Refills: 3 | Status: SHIPPED | OUTPATIENT
Start: 2018-05-29 | End: 2018-08-27 | Stop reason: SDUPTHER

## 2018-07-25 ENCOUNTER — OFFICE VISIT (OUTPATIENT)
Dept: INTERNAL MEDICINE | Facility: CLINIC | Age: 83
End: 2018-07-25

## 2018-07-25 VITALS
SYSTOLIC BLOOD PRESSURE: 127 MMHG | RESPIRATION RATE: 16 BRPM | TEMPERATURE: 98 F | DIASTOLIC BLOOD PRESSURE: 40 MMHG | HEART RATE: 69 BPM | OXYGEN SATURATION: 99 % | WEIGHT: 215.5 LBS | BODY MASS INDEX: 35.86 KG/M2

## 2018-07-25 DIAGNOSIS — E11.9 CONTROLLED TYPE 2 DIABETES MELLITUS WITHOUT COMPLICATION, WITHOUT LONG-TERM CURRENT USE OF INSULIN (HCC): ICD-10-CM

## 2018-07-25 DIAGNOSIS — E03.8 ADULT ONSET HYPOTHYROIDISM: ICD-10-CM

## 2018-07-25 DIAGNOSIS — I10 BENIGN ESSENTIAL HYPERTENSION: ICD-10-CM

## 2018-07-25 DIAGNOSIS — Z00.00 ROUTINE GENERAL MEDICAL EXAMINATION AT A HEALTH CARE FACILITY: Primary | ICD-10-CM

## 2018-07-25 DIAGNOSIS — E78.2 MIXED HYPERLIPIDEMIA: ICD-10-CM

## 2018-07-25 PROCEDURE — G0439 PPPS, SUBSEQ VISIT: HCPCS | Performed by: INTERNAL MEDICINE

## 2018-07-25 RX ORDER — QUETIAPINE FUMARATE 25 MG/1
25 TABLET, FILM COATED ORAL NIGHTLY
COMMUNITY

## 2018-07-25 NOTE — PATIENT INSTRUCTIONS
Exercising to Stay Healthy  Exercising regularly is important. It has many health benefits, such as:  · Improving your overall fitness, flexibility, and endurance.  · Increasing your bone density.  · Helping with weight control.  · Decreasing your body fat.  · Increasing your muscle strength.  · Reducing stress and tension.  · Improving your overall health.    In order to become healthy and stay healthy, it is recommended that you do moderate-intensity and vigorous-intensity exercise. You can tell that you are exercising at a moderate intensity if you have a higher heart rate and faster breathing, but you are still able to hold a conversation. You can tell that you are exercising at a vigorous intensity if you are breathing much harder and faster and cannot hold a conversation while exercising.  How often should I exercise?  Choose an activity that you enjoy and set realistic goals. Your health care provider can help you to make an activity plan that works for you. Exercise regularly as directed by your health care provider. This may include:  · Doing resistance training twice each week, such as:  ? Push-ups.  ? Sit-ups.  ? Lifting weights.  ? Using resistance bands.  · Doing a given intensity of exercise for a given amount of time. Choose from these options:  ? 150 minutes of moderate-intensity exercise every week.  ? 75 minutes of vigorous-intensity exercise every week.  ? A mix of moderate-intensity and vigorous-intensity exercise every week.    Children, pregnant women, people who are out of shape, people who are overweight, and older adults may need to consult a health care provider for individual recommendations. If you have any sort of medical condition, be sure to consult your health care provider before starting a new exercise program.  What are some exercise ideas?  Some moderate-intensity exercise ideas include:  · Walking at a rate of 1 mile in 15  minutes.  · Biking.  · Hiking.  · Golfing.  · Dancing.    Some vigorous-intensity exercise ideas include:  · Walking at a rate of at least 4.5 miles per hour.  · Jogging or running at a rate of 5 miles per hour.  · Biking at a rate of at least 10 miles per hour.  · Lap swimming.  · Roller-skating or in-line skating.  · Cross-country skiing.  · Vigorous competitive sports, such as football, basketball, and soccer.  · Jumping rope.  · Aerobic dancing.    What are some everyday activities that can help me to get exercise?  · Yard work, such as:  ? Pushing a .  ? Raking and bagging leaves.  · Washing and waxing your car.  · Pushing a stroller.  · Shoveling snow.  · Gardening.  · Washing windows or floors.  How can I be more active in my day-to-day activities?  · Use the stairs instead of the elevator.  · Take a walk during your lunch break.  · If you drive, park your car farther away from work or school.  · If you take public transportation, get off one stop early and walk the rest of the way.  · Make all of your phone calls while standing up and walking around.  · Get up, stretch, and walk around every 30 minutes throughout the day.  What guidelines should I follow while exercising?  · Do not exercise so much that you hurt yourself, feel dizzy, or get very short of breath.  · Consult your health care provider before starting a new exercise program.  · Wear comfortable clothes and shoes with good support.  · Drink plenty of water while you exercise to prevent dehydration or heat stroke. Body water is lost during exercise and must be replaced.  · Work out until you breathe faster and your heart beats faster.  This information is not intended to replace advice given to you by your health care provider. Make sure you discuss any questions you have with your health care provider.  Document Released: 01/20/2012 Document Revised: 05/25/2017 Document Reviewed: 05/21/2015  [employer] Patient Education © 2018  [employer]    Medicare Wellness  Personal Prevention Plan of Service     Date of Office Visit:  2018  Encounter Provider:  Katarzyna Griggs MD  Place of Service:  [facility]  Patient Name: Valerie Davila  :  1935    As part of the Medicare Wellness portion of your visit today, we are providing you with this personalized preventive plan of services (PPPS). This plan is based upon recommendations of the [place] Preventive Services Task Force (USPSTF) and the Advisory Committee on Immunization Practices (ACIP).    This lists the preventive care services that should be considered, and provides dates of when you are due. Items listed as completed are up-to-date and do not require any further intervention.    Health Maintenance   Topic Date Due   • TDAP/TD VACCINES (1 - Tdap) 1955   • ZOSTER VACCINE (1 of 2) 1986   • MAMMOGRAM  2018   • PNEUMOCOCCAL VACCINES (65+ LOW/MEDIUM RISK) (2 of 2 - PCV13) 2018   • MEDICARE ANNUAL WELLNESS  2018   • INFLUENZA VACCINE  2018   • HEMOGLOBIN A1C  2018   • URINE MICROALBUMIN  2019   • LIPID PANEL  2019       No orders of the defined types were placed in this encounter.      Return in 1 year (on 2019) for Medicare Wellness.

## 2018-07-25 NOTE — PROGRESS NOTES
QUICK REFERENCE INFORMATION:  The ABCs of the Annual Wellness Visit    Subsequent Medicare Wellness Visit    HEALTH RISK ASSESSMENT    1935    Recent Hospitalizations:  No hospitalization(s) within the last year..        Current Medical Providers:  Patient Care Team:  Katarzyna Griggs MD as PCP - General  Katarzyna Griggs MD as PCP - Claims Attributed        Smoking Status:  History   Smoking Status   • Former Smoker   Smokeless Tobacco   • Not on file       Alcohol Consumption:  History   Alcohol Use No       Depression Screen:   PHQ-2/PHQ-9 Depression Screening 7/25/2018   Little interest or pleasure in doing things 0   Feeling down, depressed, or hopeless 1   Total Score 1       Health Habits and Functional and Cognitive Screening:  Functional & Cognitive Status 7/25/2018   Do you have difficulty preparing food and eating? Yes   Do you have difficulty bathing yourself, getting dressed or grooming yourself? Yes   Do you have difficulty using the toilet? Yes   Do you have difficulty moving around from place to place? Yes   Do you have trouble with steps or getting out of a bed or a chair? Yes   In the past year have you fallen or experienced a near fall? Yes   Current Diet Well Balanced Diet   Dental Exam Not up to date   Eye Exam Up to date   Exercise (times per week) 0 times per week   Current Exercise Activities Include None   Do you need help using the phone?  Yes   Are you deaf or do you have serious difficulty hearing?  Yes   Do you need help with transportation? Yes   Do you need help shopping? Yes   Do you need help preparing meals?  Yes   Do you need help with housework?  Yes   Do you need help with laundry? Yes   Do you need help taking your medications? Yes   Do you need help managing money? Yes   Do you ever drive or ride in a car without wearing a seat belt? Yes   Have you felt unusual stress, anger or loneliness in the last month? No   Who do you live with? Child   If you need help, do you have trouble  finding someone available to you? No   Have you been bothered in the last four weeks by sexual problems? No   Do you have difficulty concentrating, remembering or making decisions? Yes           Does the patient have evidence of cognitive impairment? No    Aspirin use counseling: Taking ASA appropriately as indicated      Recent Lab Results:  CMP:  Lab Results   Component Value Date     (H) 05/01/2018    BUN 22 (H) 05/01/2018    CREATININE 1.10 05/01/2018    EGFRIFNONA 48 (L) 05/01/2018    EGFRIFAFRI 58 (L) 05/01/2018    BCR 20.0 05/01/2018     05/01/2018    K 5.2 (H) 05/01/2018    CO2 27.0 05/01/2018    CALCIUM 9.9 05/01/2018    PROTENTOTREF 8.0 05/01/2018    ALBUMIN 4.30 05/01/2018    LABGLOBREF 3.7 05/01/2018    LABIL2 1.2 05/01/2018    BILITOT 0.9 05/01/2018    ALKPHOS 60 05/01/2018    AST 32 05/01/2018    ALT 36 05/01/2018     Lipid Panel:  Lab Results   Component Value Date    CHOL 231 (H) 01/23/2017    TRIG 66 05/01/2018    HDL 84 (H) 05/01/2018    VLDL 13.2 05/01/2018     HbA1c:  Lab Results   Component Value Date    HGBA1C 5.90 05/01/2018       Visual Acuity:  No exam data present  Sees eye doctor     Age-appropriate Screening Schedule:  Refer to the list below for future screening recommendations based on patient's age, sex and/or medical conditions. Orders for these recommended tests are listed in the plan section. The patient has been provided with a written plan.    Health Maintenance   Topic Date Due   • TDAP/TD VACCINES (1 - Tdap) 09/28/1954   • ZOSTER VACCINE (1 of 2) 09/28/1986   • MAMMOGRAM  02/05/2018   • PNEUMOCOCCAL VACCINES (65+ LOW/MEDIUM RISK) (2 of 2 - PCV13) 05/01/2018   • INFLUENZA VACCINE  08/01/2018   • HEMOGLOBIN A1C  11/01/2018   • URINE MICROALBUMIN  01/23/2019   • LIPID PANEL  05/01/2019        Chief Complaint   Patient presents with   • Annual Exam   • Hypertension   • Hyperlipidemia   • Diabetes   • Hypothyroidism       Subjective   History of Present Illness    Valerie  MARIE Davila is a 82 y.o. female who presents for an Subsequent Wellness Visit. Patient is here to follow up on the blood pressure  The patient is taking the blood pressure medications as prescribed and has had no side effects. The patient also needs refills on medications . The patient is also here to follow up on the cholesterol and is trying to follow a diet. The patient is also here to follow on sugar and thyroid  due to get lab work done .     The following portions of the patient's history were reviewed and updated as appropriate: allergies, current medications, past family history, past medical history, past social history, past surgical history and problem list.    Outpatient Medications Prior to Visit   Medication Sig Dispense Refill   • carvedilol (COREG) 25 MG tablet Take 25 mg by mouth 2 (Two) Times a Day With Meals.     • escitalopram (LEXAPRO) 20 MG tablet Take 1 tablet by mouth Daily. 90 tablet 3   • furosemide (LASIX) 20 MG tablet Take 1 tablet by mouth Daily. 90 tablet 3   • glimepiride (AMARYL) 4 MG tablet Take 1 tablet by mouth Daily. 90 tablet 3   • hydrALAZINE (APRESOLINE) 50 MG tablet Take 1 tablet by mouth Daily. 90 tablet 3   • levothyroxine (SYNTHROID, LEVOTHROID) 88 MCG tablet Take 1 tablet by mouth Daily. 90 tablet 3   • memantine (NAMENDA TITRATION PACK) 5 (28)-10 (21) MG tablet pack Follow package directions.  12   • olmesartan (BENICAR) 40 MG tablet Take 1 tablet by mouth Daily. 90 tablet 3   • potassium chloride (KLOR-CON) 10 MEQ CR tablet Take 1 tablet by mouth Daily. 90 tablet 3   • pravastatin (PRAVACHOL) 40 MG tablet Take 1 tablet by mouth Daily. 90 tablet 3   • spironolactone-hydrochlorothiazide (ALDACTAZIDE) 25-25 MG tablet Take 1 tablet by mouth Daily.     • timolol (BETIMOL) 0.5 % ophthalmic solution Apply  to eye.     • travoprost, TA free, (TRAVATAN Z) 0.004 % solution ophthalmic solution Apply  to eye.     • traZODone (DESYREL) 100 MG tablet Take 1 tablet by mouth Every  Night. 30 tablet 11     Facility-Administered Medications Prior to Visit   Medication Dose Route Frequency Provider Last Rate Last Dose   • cyanocobalamin injection 1,000 mcg  1,000 mcg Intramuscular Q28 Days Katarzyna Griggs MD   1,000 mcg at 10/03/16 1518       Patient Active Problem List   Diagnosis   • Abnormal weight loss   • Acute bronchitis   • Alzheimer's dementia with behavioral disturbance   • Benign essential hypertension   • Dementia   • Type 2 diabetes mellitus (CMS/HCC)   • Dizziness   • Edema   • Gastroesophageal reflux disease with esophagitis   • Syncope   • Fatigue   • Hypothyroidism   • Memory loss   • Breast pain   • Proteinuria   • Multiple-type hyperlipidemia   • Subacute delirium   • Uncontrolled type 2 diabetes mellitus (CMS/HCC)   • Cobalamin deficiency   • Vitamin D deficiency   • Diverticulosis of colon   • Influenza vaccine needed       Advance Care Planning:  has an advance directive - a copy HAS NOT been provided. Have asked the patient to send this to us to add to record.    Identification of Risk Factors:  Risk factors include: weight , unhealthy diet, cardiovascular risk and inactivity.    Review of Systems   Constitutional: Negative for appetite change, fatigue and fever.   HENT: Negative for congestion, ear discharge, ear pain, sinus pressure and sore throat.    Eyes: Negative for pain and discharge.   Respiratory: Negative for cough, chest tightness, shortness of breath and wheezing.    Cardiovascular: Negative for chest pain, palpitations and leg swelling.   Gastrointestinal: Negative for abdominal pain, blood in stool, constipation, diarrhea and nausea.   Endocrine: Negative for cold intolerance and heat intolerance.   Genitourinary: Negative for dysuria, flank pain and frequency.   Musculoskeletal: Negative for back pain and joint swelling.   Skin: Negative for color change.   Allergic/Immunologic: Negative for environmental allergies and food allergies.   Neurological: Negative  for dizziness, weakness, numbness and headaches.   Hematological: Negative for adenopathy. Does not bruise/bleed easily.   Psychiatric/Behavioral: Negative for behavioral problems and dysphoric mood. The patient is not nervous/anxious.        Compared to one year ago, the patient feels her physical health is the same.  Compared to one year ago, the patient feels her mental health is the same.    Objective     Physical Exam   Constitutional: She is oriented to person, place, and time. She appears well-developed and well-nourished. No distress.   HENT:   Head: Normocephalic and atraumatic.   Right Ear: External ear normal.   Left Ear: External ear normal.   Nose: Nose normal.   Mouth/Throat: Oropharynx is clear and moist.   Eyes: Pupils are equal, round, and reactive to light. Conjunctivae and EOM are normal.   Neck: Neck supple. No thyromegaly present.   Cardiovascular: Normal rate, regular rhythm and normal heart sounds.    Pulmonary/Chest: Effort normal and breath sounds normal. No respiratory distress.   Abdominal: Soft. Bowel sounds are normal. She exhibits no distension. There is no tenderness. There is no rebound.   Musculoskeletal: Normal range of motion. She exhibits no edema.   Lymphadenopathy:     She has no cervical adenopathy.   Neurological: She is alert and oriented to person, place, and time.   No gross motor or sensory deficits   Skin: Skin is warm. She is not diaphoretic.   Psychiatric: She has a normal mood and affect.   Nursing note and vitals reviewed.      Vitals:    07/25/18 1550   BP: 127/40   Pulse: 69   Resp: 16   Temp: 98 °F (36.7 °C)   TempSrc: Oral   SpO2: 99%   Weight: 97.8 kg (215 lb 8 oz)   PainSc: 0-No pain       Patient's Body mass index is 35.86 kg/m². BMI is within normal parameters. No follow-up required.      Assessment/Plan   Patient Self-Management and Personalized Health Advice  The patient has been provided with information about: diet, exercise, weight management, prevention  of cardiac or vascular disease and the relationship between weight and GERD and preventive services including:   · Diabetes screening, see lab orders, Exercise counseling provided, Nutrition counseling provided.    Visit Diagnoses:    ICD-10-CM ICD-9-CM   1. Routine general medical examination at a health care facility Z00.00 V70.0   2. Benign essential hypertension I10 401.1   3. Mixed hyperlipidemia E78.2 272.2   4. Controlled type 2 diabetes mellitus without complication, without long-term current use of insulin (CMS/[facility]) E11.9 250.00   5. Adult onset hypothyroidism E03.8 244.8     Plan:  1.physical: Physical exam conducted today, reviewed fasting lab work,   Impression: healthy adult Patient. Currently, patient eats a healthy diet. Screening lab work includes glucose, lipid profile and 25-hydroxyvitamin D. The risks and benefits of immunizations were discussed and immunizations are up to date. Advice and education were given regarding nutrition and aerobic exercise. Patient discussion: discussed with the patient.  Annual Wellness Visit  with preventive exam as well as age and risk appropriate counseling completed.   Advance Directive Planning: paperwork and instructions were given to the patient.   Patient Discussion: plan discussed with the patient, follow-up visit needed in one year.   Medication Review and medication list updated  2.  Benign essential hypertension: Will continue current medication, low-sodium diet advised  3.mixed hyperlipidemia: will obtain   fasting CMP and lipid panel.  Diet and excise counseled,  Will continue current medications  4. Diabetes  Mellitus  : will obtain   fasting CMP  and hba1c  , diet and exercise counseled , Will continue current medications  5. hypothyroidism: will obtain tsh , and continue levothyroxine      No orders of the defined types were placed in this encounter.      Outpatient Encounter Prescriptions as of 7/25/2018   Medication Sig Dispense Refill   • carvedilol  (COREG) 25 MG tablet Take 25 mg by mouth 2 (Two) Times a Day With Meals.     • escitalopram (LEXAPRO) 20 MG tablet Take 1 tablet by mouth Daily. 90 tablet 3   • furosemide (LASIX) 20 MG tablet Take 1 tablet by mouth Daily. 90 tablet 3   • glimepiride (AMARYL) 4 MG tablet Take 1 tablet by mouth Daily. 90 tablet 3   • hydrALAZINE (APRESOLINE) 50 MG tablet Take 1 tablet by mouth Daily. 90 tablet 3   • levothyroxine (SYNTHROID, LEVOTHROID) 88 MCG tablet Take 1 tablet by mouth Daily. 90 tablet 3   • memantine (NAMENDA TITRATION PACK) 5 (28)-10 (21) MG tablet pack Follow package directions.  12   • olmesartan (BENICAR) 40 MG tablet Take 1 tablet by mouth Daily. 90 tablet 3   • potassium chloride (KLOR-CON) 10 MEQ CR tablet Take 1 tablet by mouth Daily. 90 tablet 3   • pravastatin (PRAVACHOL) 40 MG tablet Take 1 tablet by mouth Daily. 90 tablet 3   • QUEtiapine (SEROquel) 25 MG tablet Take 25 mg by mouth Every Night.     • spironolactone-hydrochlorothiazide (ALDACTAZIDE) 25-25 MG tablet Take 1 tablet by mouth Daily.     • timolol (BETIMOL) 0.5 % ophthalmic solution Apply  to eye.     • travoprost, TA free, (TRAVATAN Z) 0.004 % solution ophthalmic solution Apply  to eye.     • traZODone (DESYREL) 100 MG tablet Take 1 tablet by mouth Every Night. 30 tablet 11     Facility-Administered Encounter Medications as of 7/25/2018   Medication Dose Route Frequency Provider Last Rate Last Dose   • cyanocobalamin injection 1,000 mcg  1,000 mcg Intramuscular Q28 Days Katarzyna Griggs MD   1,000 mcg at 10/03/16 1518       Reviewed use of high risk medication in the elderly: yes  Reviewed for potential of harmful drug interactions in the elderly: yes    Follow Up:  Return in 1 year (on 7/25/2019) for Medicare Wellness.     An After Visit Summary and PPPS with all of these plans were given to the patient.

## 2018-08-24 LAB
ALBUMIN SERPL-MCNC: 3.7 G/DL (ref 3.5–5)
ALBUMIN/GLOB SERPL: 1.2 G/DL (ref 1–2)
ALP SERPL-CCNC: 54 U/L (ref 38–126)
ALT SERPL-CCNC: 26 U/L (ref 13–69)
AST SERPL-CCNC: 36 U/L (ref 15–46)
BASOPHILS # BLD AUTO: 0.06 10*3/MM3 (ref 0–0.2)
BASOPHILS NFR BLD AUTO: 0.9 % (ref 0–2.5)
BILIRUB SERPL-MCNC: 0.8 MG/DL (ref 0.2–1.3)
BUN SERPL-MCNC: 28 MG/DL (ref 7–20)
BUN/CREAT SERPL: 20 (ref 7.1–23.5)
CALCIUM SERPL-MCNC: 9.1 MG/DL (ref 8.4–10.2)
CHLORIDE SERPL-SCNC: 104 MMOL/L (ref 98–107)
CHOLEST SERPL-MCNC: 201 MG/DL (ref 0–199)
CO2 SERPL-SCNC: 28 MMOL/L (ref 26–30)
CREAT SERPL-MCNC: 1.4 MG/DL (ref 0.6–1.3)
EOSINOPHIL # BLD AUTO: 0.21 10*3/MM3 (ref 0–0.7)
EOSINOPHIL NFR BLD AUTO: 3 % (ref 0–7)
ERYTHROCYTE [DISTWIDTH] IN BLOOD BY AUTOMATED COUNT: 12.2 % (ref 11.5–14.5)
GLOBULIN SER CALC-MCNC: 3 GM/DL
GLUCOSE SERPL-MCNC: 114 MG/DL (ref 74–98)
HBA1C MFR BLD: 5.7 %
HCT VFR BLD AUTO: 41.5 % (ref 37–47)
HDLC SERPL-MCNC: 74 MG/DL (ref 40–60)
HGB BLD-MCNC: 13 G/DL (ref 12–16)
IMM GRANULOCYTES # BLD: 0.02 10*3/MM3 (ref 0–0.06)
IMM GRANULOCYTES NFR BLD: 0.3 % (ref 0–0.6)
LDLC SERPL CALC-MCNC: 113 MG/DL (ref 0–99)
LYMPHOCYTES # BLD AUTO: 2.03 10*3/MM3 (ref 0.6–3.4)
LYMPHOCYTES NFR BLD AUTO: 28.9 % (ref 10–50)
MCH RBC QN AUTO: 31.5 PG (ref 27–31)
MCHC RBC AUTO-ENTMCNC: 31.3 G/DL (ref 30–37)
MCV RBC AUTO: 100.5 FL (ref 81–99)
MONOCYTES # BLD AUTO: 0.53 10*3/MM3 (ref 0–0.9)
MONOCYTES NFR BLD AUTO: 7.5 % (ref 0–12)
NEUTROPHILS # BLD AUTO: 4.18 10*3/MM3 (ref 2–6.9)
NEUTROPHILS NFR BLD AUTO: 59.4 % (ref 37–80)
NRBC BLD AUTO-RTO: 0 /100 WBC (ref 0–0)
PLATELET # BLD AUTO: 130 10*3/MM3 (ref 130–400)
POTASSIUM SERPL-SCNC: 5.3 MMOL/L (ref 3.5–5.1)
PROT SERPL-MCNC: 6.7 G/DL (ref 6.3–8.2)
RBC # BLD AUTO: 4.13 10*6/MM3 (ref 4.2–5.4)
SODIUM SERPL-SCNC: 137 MMOL/L (ref 137–145)
TRIGL SERPL-MCNC: 70 MG/DL
TSH SERPL DL<=0.005 MIU/L-ACNC: 4.08 MIU/ML (ref 0.47–4.68)
VLDLC SERPL CALC-MCNC: 14 MG/DL
WBC # BLD AUTO: 7.03 10*3/MM3 (ref 4.8–10.8)

## 2018-08-27 ENCOUNTER — HOSPITAL ENCOUNTER (OUTPATIENT)
Dept: GENERAL RADIOLOGY | Facility: HOSPITAL | Age: 83
Discharge: HOME | End: 2018-08-27
Admitting: INTERNAL MEDICINE

## 2018-08-27 ENCOUNTER — OFFICE VISIT (OUTPATIENT)
Dept: INTERNAL MEDICINE | Facility: CLINIC | Age: 83
End: 2018-08-27

## 2018-08-27 VITALS
DIASTOLIC BLOOD PRESSURE: 65 MMHG | WEIGHT: 221 LBS | SYSTOLIC BLOOD PRESSURE: 150 MMHG | BODY MASS INDEX: 36.78 KG/M2 | RESPIRATION RATE: 18 BRPM | HEART RATE: 65 BPM | OXYGEN SATURATION: 99 % | TEMPERATURE: 98.1 F

## 2018-08-27 DIAGNOSIS — F51.04 INSOMNIA, PSYCHOPHYSIOLOGICAL: ICD-10-CM

## 2018-08-27 DIAGNOSIS — E11.9 CONTROLLED TYPE 2 DIABETES MELLITUS WITHOUT COMPLICATION, WITHOUT LONG-TERM CURRENT USE OF INSULIN (HCC): ICD-10-CM

## 2018-08-27 DIAGNOSIS — R05.9 COUGH: ICD-10-CM

## 2018-08-27 DIAGNOSIS — I10 BENIGN ESSENTIAL HYPERTENSION: Primary | ICD-10-CM

## 2018-08-27 DIAGNOSIS — E78.2 MIXED HYPERLIPIDEMIA: ICD-10-CM

## 2018-08-27 DIAGNOSIS — E87.5 HYPERKALEMIA: ICD-10-CM

## 2018-08-27 DIAGNOSIS — E03.8 ADULT ONSET HYPOTHYROIDISM: ICD-10-CM

## 2018-08-27 DIAGNOSIS — R13.14 PHARYNGOESOPHAGEAL DYSPHAGIA: ICD-10-CM

## 2018-08-27 PROCEDURE — 99214 OFFICE O/P EST MOD 30 MIN: CPT | Performed by: INTERNAL MEDICINE

## 2018-08-27 PROCEDURE — 71046 X-RAY EXAM CHEST 2 VIEWS: CPT

## 2018-08-27 RX ORDER — GLIMEPIRIDE 2 MG/1
2 TABLET ORAL DAILY
Qty: 90 TABLET | Refills: 3 | Status: SHIPPED | OUTPATIENT
Start: 2018-08-27 | End: 2019-01-22

## 2018-08-27 RX ORDER — FUROSEMIDE 20 MG/1
20 TABLET ORAL DAILY
Qty: 90 TABLET | Refills: 3 | Status: SHIPPED | OUTPATIENT
Start: 2018-08-27 | End: 2018-10-23

## 2018-08-27 RX ORDER — TRAZODONE HYDROCHLORIDE 100 MG/1
100 TABLET ORAL NIGHTLY
Qty: 90 TABLET | Refills: 3 | Status: SHIPPED | OUTPATIENT
Start: 2018-08-27 | End: 2019-09-11 | Stop reason: SDUPTHER

## 2018-08-27 RX ORDER — OLMESARTAN MEDOXOMIL 40 MG/1
40 TABLET ORAL DAILY
Qty: 90 TABLET | Refills: 3 | Status: SHIPPED | OUTPATIENT
Start: 2018-08-27 | End: 2019-09-11 | Stop reason: SDUPTHER

## 2018-08-27 RX ORDER — POTASSIUM CHLORIDE 750 MG/1
10 TABLET, EXTENDED RELEASE ORAL DAILY
Qty: 90 TABLET | Refills: 3 | Status: SHIPPED | OUTPATIENT
Start: 2018-08-27 | End: 2019-01-22

## 2018-08-27 RX ORDER — PRAVASTATIN SODIUM 40 MG
40 TABLET ORAL DAILY
Qty: 90 TABLET | Refills: 3 | Status: SHIPPED | OUTPATIENT
Start: 2018-08-27 | End: 2019-09-11 | Stop reason: SDUPTHER

## 2018-08-27 NOTE — PROGRESS NOTES
Subjective   Valerie Davila is a 82 y.o. female.     Chief Complaint   Patient presents with   • Hyperlipidemia   • Hypertension   • Cough   • Difficulty Swallowing   • Insomnia   • Diabetes   • Hypothyroidism       History of Present Illness   HPI: Patient is here to follow up on the blood pressure  The patient is taking the blood pressure medications as prescribed and has had no side effects. The patient also needs refills on medications . The patient is also here to follow up on the cholesterol and is trying to follow a diet. The patient is also here to follow on sugar and thyroid and  lab work done .  She complains of dysphagia and a cough since the past few weeks, dysphagia  More to liquids , she also complains  Insomnia and is taking trazodone  Hyperlipidemia   Pertinent negatives include no chest pain or shortness of breath.   Hypertension   Pertinent negatives include no chest pain, palpitations or shortness of breath.   Diabetes   Pertinent negatives for hypoglycemia include no dizziness, nervousness/anxiousness or pallor. Pertinent negatives for diabetes include no chest pain, no fatigue and no weakness.      The following portions of the patient's history were reviewed and updated as appropriate: allergies, current medications, past family history, past medical history, past social history, past surgical history and problem list.    Review of Systems   Constitutional: Negative for appetite change, fatigue and fever.   HENT: Negative for congestion, ear discharge, ear pain, sinus pressure and sore throat.    Eyes: Negative for pain and discharge.   Respiratory: Positive for cough. Negative for chest tightness, shortness of breath and wheezing.    Cardiovascular: Negative for chest pain, palpitations and leg swelling.   Gastrointestinal: Negative for abdominal pain, blood in stool, constipation, diarrhea and nausea.        Dysphagia    Endocrine: Negative for cold intolerance and heat intolerance.    Genitourinary: Negative for dysuria, flank pain and frequency.   Musculoskeletal: Negative for back pain and joint swelling.   Skin: Negative for color change.   Allergic/Immunologic: Negative for environmental allergies and food allergies.   Neurological: Negative for dizziness, weakness, numbness and headaches.   Hematological: Negative for adenopathy. Does not bruise/bleed easily.   Psychiatric/Behavioral: Positive for sleep disturbance. Negative for behavioral problems and dysphoric mood. The patient is not nervous/anxious.          Current Outpatient Prescriptions:   •  carvedilol (COREG) 25 MG tablet, Take 25 mg by mouth 2 (Two) Times a Day With Meals., Disp: , Rfl:   •  escitalopram (LEXAPRO) 20 MG tablet, Take 1 tablet by mouth Daily., Disp: 90 tablet, Rfl: 3  •  furosemide (LASIX) 20 MG tablet, Take 1 tablet by mouth Daily., Disp: 90 tablet, Rfl: 3  •  glimepiride (AMARYL) 2 MG tablet, Take 1 tablet by mouth Daily., Disp: 90 tablet, Rfl: 3  •  hydrALAZINE (APRESOLINE) 50 MG tablet, Take 1 tablet by mouth Daily., Disp: 90 tablet, Rfl: 3  •  levothyroxine (SYNTHROID, LEVOTHROID) 88 MCG tablet, Take 1 tablet by mouth Daily., Disp: 90 tablet, Rfl: 3  •  memantine (NAMENDA TITRATION PACK) 5 (28)-10 (21) MG tablet pack, Follow package directions., Disp: , Rfl: 12  •  olmesartan (BENICAR) 40 MG tablet, Take 1 tablet by mouth Daily., Disp: 90 tablet, Rfl: 3  •  potassium chloride (KLOR-CON) 10 MEQ CR tablet, Take 1 tablet by mouth Daily., Disp: 90 tablet, Rfl: 3  •  pravastatin (PRAVACHOL) 40 MG tablet, Take 1 tablet by mouth Daily., Disp: 90 tablet, Rfl: 3  •  QUEtiapine (SEROquel) 25 MG tablet, Take 25 mg by mouth Every Night., Disp: , Rfl:   •  spironolactone-hydrochlorothiazide (ALDACTAZIDE) 25-25 MG tablet, Take 1 tablet by mouth Daily., Disp: , Rfl:   •  timolol (BETIMOL) 0.5 % ophthalmic solution, Apply  to eye., Disp: , Rfl:   •  travoprost, TA free, (TRAVATAN Z) 0.004 % solution ophthalmic solution,  Apply  to eye., Disp: , Rfl:   •  traZODone (DESYREL) 100 MG tablet, Take 1 tablet by mouth Every Night., Disp: 90 tablet, Rfl: 3    Current Facility-Administered Medications:   •  cyanocobalamin injection 1,000 mcg, 1,000 mcg, Intramuscular, Q28 Days, Katarzyna Griggs MD, 1,000 mcg at 10/03/16 1518    Objective     Blood pressure 150/65, pulse 65, temperature 98.1 °F (36.7 °C), temperature source Oral, resp. rate 18, weight 100 kg (221 lb), SpO2 99 %.    Physical Exam   Constitutional: She is oriented to person, place, and time. She appears well-developed and well-nourished. No distress.   HENT:   Head: Normocephalic and atraumatic.   Right Ear: External ear normal.   Left Ear: External ear normal.   Nose: Nose normal.   Mouth/Throat: Oropharyngeal exudate present.   Eyes: Pupils are equal, round, and reactive to light. Conjunctivae and EOM are normal.   Neck: Neck supple. No thyromegaly present.   Cardiovascular: Normal rate, regular rhythm and normal heart sounds.    Pulmonary/Chest: Effort normal and breath sounds normal. No respiratory distress.   Abdominal: Soft. Bowel sounds are normal. She exhibits no distension. There is no tenderness. There is no rebound.   Musculoskeletal: Normal range of motion. She exhibits no edema.   Lymphadenopathy:     She has no cervical adenopathy.   Neurological: She is alert and oriented to person, place, and time.   No gross motor or sensory deficits   Skin: Skin is warm. She is not diaphoretic.   Psychiatric: She has a normal mood and affect.   Nursing note and vitals reviewed.      Results for orders placed or performed in visit on 05/29/18   Comprehensive Metabolic Panel   Result Value Ref Range    Glucose 114 (H) 74 - 98 mg/dL    BUN 28 (H) 7 - 20 mg/dL    Creatinine 1.40 (H) 0.60 - 1.30 mg/dL    eGFR Non African Am 36 (L) >60 mL/min/1.73    eGFR African Am 44 (L) >60 mL/min/1.73    BUN/Creatinine Ratio 20.0 7.1 - 23.5    Sodium 137 137 - 145 mmol/L    Potassium 5.3 (H) 3.5 -  5.1 mmol/L    Chloride 104 98 - 107 mmol/L    Total CO2 28.0 26.0 - 30.0 mmol/L    Calcium 9.1 8.4 - 10.2 mg/dL    Total Protein 6.7 6.3 - 8.2 g/dL    Albumin 3.70 3.50 - 5.00 g/dL    Globulin 3.0 gm/dL    A/G Ratio 1.2 1.0 - 2.0 g/dL    Total Bilirubin 0.8 0.2 - 1.3 mg/dL    Alkaline Phosphatase 54 38 - 126 U/L    AST (SGOT) 36 15 - 46 U/L    ALT (SGPT) 26 13 - 69 U/L   Lipid Panel   Result Value Ref Range    Total Cholesterol 201 (H) 0 - 199 mg/dL    Triglycerides 70 <150 mg/dL    HDL Cholesterol 74 (H) 40 - 60 mg/dL    VLDL Cholesterol 14 mg/dL    LDL Cholesterol  113 (H) 0 - 99 mg/dL   Hemoglobin A1c   Result Value Ref Range    Hemoglobin A1C 5.70 %   TSH   Result Value Ref Range    TSH 4.080 0.470 - 4.680 mIU/mL   CBC & Differential   Result Value Ref Range    WBC 7.03 4.80 - 10.80 10*3/mm3    RBC 4.13 (L) 4.20 - 5.40 10*6/mm3    Hemoglobin 13.0 12.0 - 16.0 g/dL    Hematocrit 41.5 37.0 - 47.0 %    .5 (H) 81.0 - 99.0 fL    MCH 31.5 (H) 27.0 - 31.0 pg    MCHC 31.3 30.0 - 37.0 g/dL    RDW 12.2 11.5 - 14.5 %    Platelets 130 130 - 400 10*3/mm3    Neutrophil Rel % 59.4 37.0 - 80.0 %    Lymphocyte Rel % 28.9 10.0 - 50.0 %    Monocyte Rel % 7.5 0.0 - 12.0 %    Eosinophil Rel % 3.0 0.0 - 7.0 %    Basophil Rel % 0.9 0.0 - 2.5 %    Neutrophils Absolute 4.18 2.00 - 6.90 10*3/mm3    Lymphocytes Absolute 2.03 0.60 - 3.40 10*3/mm3    Monocytes Absolute 0.53 0.00 - 0.90 10*3/mm3    Eosinophils Absolute 0.21 0.00 - 0.70 10*3/mm3    Basophils Absolute 0.06 0.00 - 0.20 10*3/mm3    Immature Granulocyte Rel % 0.3 0.0 - 0.6 %    Immature Grans Absolute 0.02 0.00 - 0.06 10*3/mm3    nRBC 0.0 0.0 - 0.0 /100 WBC         Assessment/Plan   Valerie was seen today for hyperlipidemia, hypertension, cough, difficulty swallowing, insomnia, diabetes and hypothyroidism.    Diagnoses and all orders for this visit:    Benign essential hypertension    Mixed hyperlipidemia  -     CBC & Differential  -     Comprehensive Metabolic Panel  -      Lipid Panel    Controlled type 2 diabetes mellitus without complication, without long-term current use of insulin (CMS/[facility])  -     Hemoglobin A1c  -     Microalbumin / Creatinine Urine Ratio - Urine, Clean Catch    Adult onset hypothyroidism  -     TSH    Pharyngoesophageal dysphagia  -     Ambulatory Referral to Gastroenterology    Cough  -     XR Chest PA & Lateral    Insomnia, psychophysiological    Hyperkalemia    Other orders  -     traZODone (DESYREL) 100 MG tablet; Take 1 tablet by mouth Every Night.  -     glimepiride (AMARYL) 2 MG tablet; Take 1 tablet by mouth Daily.  -     olmesartan (BENICAR) 40 MG tablet; Take 1 tablet by mouth Daily.  -     potassium chloride (KLOR-CON) 10 MEQ CR tablet; Take 1 tablet by mouth Daily.  -     pravastatin (PRAVACHOL) 40 MG tablet; Take 1 tablet by mouth Daily.  -     furosemide (LASIX) 20 MG tablet; Take 1 tablet by mouth Daily.      Plan:  1.  Benign essential hypertension: Will continue current medication, low-sodium diet advised  2.mixed hyperlipidemia:  reviewed  fasting CMP and lipid panel.  Diet and excise counseled,  Will continue current medications  3. Diabetes mellitus :   reviewed  fasting CMP  and hba1c -5.7   , diet and exercise counseled , decrease to amaryl 2mg po qd  4. hypothyroidism:  reviewed  tsh , and continue levothyroxine  5. Hyperkalemia: decrease to potasium twice a week with potassium restricted diet [ daughter states that would be difficult to restrict with patient ]  ,potassium 5.3  6. Dysphagia: Will refer patient to GI for EGD  7. Insomnia : Will continue with trazodone, labs reviewed  8. Cough : We will obtain chest x-ray, most secondary to her dysphagia            Katarzyna Griggs MD

## 2018-09-18 RX ORDER — POTASSIUM CHLORIDE 750 MG/1
TABLET, FILM COATED, EXTENDED RELEASE ORAL
Qty: 76 TABLET | Refills: 2 | Status: SHIPPED | OUTPATIENT
Start: 2018-09-18 | End: 2018-10-23 | Stop reason: SDUPTHER

## 2018-09-18 RX ORDER — PRAVASTATIN SODIUM 40 MG
TABLET ORAL
Qty: 65 TABLET | Refills: 2 | Status: SHIPPED | OUTPATIENT
Start: 2018-09-18 | End: 2018-10-23 | Stop reason: SDUPTHER

## 2018-10-23 ENCOUNTER — OFFICE VISIT (OUTPATIENT)
Dept: GASTROENTEROLOGY | Facility: CLINIC | Age: 83
End: 2018-10-23

## 2018-10-23 VITALS
HEIGHT: 65 IN | HEART RATE: 60 BPM | BODY MASS INDEX: 36.82 KG/M2 | SYSTOLIC BLOOD PRESSURE: 140 MMHG | RESPIRATION RATE: 16 BRPM | DIASTOLIC BLOOD PRESSURE: 66 MMHG | WEIGHT: 221 LBS | TEMPERATURE: 97.2 F

## 2018-10-23 DIAGNOSIS — R13.10 DYSPHAGIA, UNSPECIFIED TYPE: Primary | ICD-10-CM

## 2018-10-23 DIAGNOSIS — R19.7 DIARRHEA, UNSPECIFIED TYPE: ICD-10-CM

## 2018-10-23 PROCEDURE — 99204 OFFICE O/P NEW MOD 45 MIN: CPT | Performed by: INTERNAL MEDICINE

## 2018-10-23 RX ORDER — FUROSEMIDE 20 MG/1
20 TABLET ORAL DAILY PRN
COMMUNITY
End: 2019-01-22

## 2018-10-23 RX ORDER — LAMOTRIGINE 25 MG/1
25 TABLET ORAL DAILY
COMMUNITY

## 2018-10-23 NOTE — PROGRESS NOTES
Chief Complaint   Patient presents with   • Difficulty Swallowing     History of Present Illness     The patient has difficulty swallowing off and for the last 3 months or so. The symptom is moderate in severity, occurs perhaps once or twice a week and is mostly associated with solid foods. Has history of choking spells. These are associated with significant coughing. The symptoms are not progressive.  The patient points towards the lower substernal area.  There is no associated weight loss. There is no history of CVA. There is no history of lumpy feeling in the throat. The patient denies history of recent weight loss. She denies nausea or vomiting. There is no history of abdominal pain. She denies constipation. There is no history of overt GI bleed (hematemesis, melena or hematochezia).    The patient has history of diarrhea off-and-on for the last several years  Severity is mild, frequency of bowel movements being 2-3 times a day.  The stools are described as loose and occasionally watery.  Occasionally, there is no nocturnal element of diarrhea. The diarrhea is not associated with tenesmus.    The patient had a colonoscopy 4-5 years ago and had a polyp removed. There is no family history of colon cancer, inflammatory bowel disease or chronic liver disease. The patient has history of memory issues. She has problem remembering recent events and up to 20 years ago. Evidence of prior to 20 years the patient has recollection. Furthermore she didn't recognize people who are over 20 years of age.     Review of Systems   Constitutional: Positive for unexpected weight change. Negative for appetite change, chills, fatigue and fever.   HENT: Positive for trouble swallowing. Negative for mouth sores and nosebleeds.    Eyes: Negative for discharge and redness.   Respiratory: Positive for cough and choking. Negative for apnea and shortness of breath.    Cardiovascular: Negative for chest pain, palpitations and leg swelling.    Gastrointestinal: Positive for diarrhea. Negative for abdominal distention, abdominal pain, anal bleeding, blood in stool, constipation, nausea and vomiting.   Endocrine: Positive for cold intolerance. Negative for heat intolerance and polydipsia.   Genitourinary: Negative for dysuria, hematuria and urgency.   Musculoskeletal: Negative for arthralgias, joint swelling and myalgias.   Skin: Negative for rash.   Allergic/Immunologic: Negative for food allergies and immunocompromised state.   Neurological: Negative for dizziness, seizures, syncope and headaches.   Hematological: Negative for adenopathy. Does not bruise/bleed easily.   Psychiatric/Behavioral: Negative for dysphoric mood. The patient is nervous/anxious. The patient is not hyperactive.      Patient Active Problem List   Diagnosis   • Abnormal weight loss   • Acute bronchitis   • Alzheimer's dementia with behavioral disturbance   • Benign essential hypertension   • Dementia   • Type 2 diabetes mellitus (CMS/HCC)   • Dizziness   • Edema   • Gastroesophageal reflux disease with esophagitis   • Syncope   • Fatigue   • Hypothyroidism   • Memory loss   • Breast pain   • Proteinuria   • Multiple-type hyperlipidemia   • Subacute delirium   • Uncontrolled type 2 diabetes mellitus (CMS/HCC)   • Cobalamin deficiency   • Vitamin D deficiency   • Diverticulosis of colon   • Influenza vaccine needed     Past Medical History:   Diagnosis Date   • Abnormal weight loss    • Acute bronchitis    • Allergic rhinitis    • Anxiety    • Arthritis    • Benign neoplasm of large bowel     Colon polyp   • Cellulitis    • Diabetes (CMS/HCC) 2018   • Diverticulitis of colon    • Dizziness    • Dysphagia    • Early onset Alzheimer's disease with behavioral disturbance    • Edema    • Enthesopathy of knee    • Esophageal reflux    • Fainting    • Glaucoma    • Hyperlipidemia    • Hypertension    • Hypothyroidism    • Knee pain    • Loss of memory    • Mastodynia    • Osteoarthritis of  knee    • Proteinuria    • Subacute delirium    • Vertigo    • Vitamin B12 deficiency    • Vitamin D deficiency      Past Surgical History:   Procedure Laterality Date   • CATARACT EXTRACTION Bilateral 1990   • HYSTERECTOMY  1983    and bilateral salpingo-oophorectomy   • REPLACEMENT TOTAL KNEE Right 2013     Family History   Problem Relation Age of Onset   • Pneumonia Mother         CAUSED HER DEATH IN HER 70'S   • Heart attack Father    • Stroke Other    • Colon cancer Neg Hx    • Cirrhosis Neg Hx    • Liver disease Neg Hx    • Liver cancer Neg Hx    • Stomach cancer Neg Hx    • Esophageal cancer Neg Hx    • Crohn's disease Neg Hx    • Ulcerative colitis Neg Hx      Social History   Substance Use Topics   • Smoking status: Former Smoker     Packs/day: 0.50     Types: Cigarettes     Quit date: 1981   • Smokeless tobacco: Never Used   • Alcohol use No       Current Outpatient Prescriptions:   •  carvedilol (COREG) 25 MG tablet, Take 25 mg by mouth 2 (Two) Times a Day With Meals., Disp: , Rfl:   •  escitalopram (LEXAPRO) 20 MG tablet, Take 1 tablet by mouth Daily., Disp: 90 tablet, Rfl: 3  •  furosemide (LASIX) 20 MG tablet, Take 20 mg by mouth Daily As Needed., Disp: , Rfl:   •  glimepiride (AMARYL) 2 MG tablet, Take 1 tablet by mouth Daily., Disp: 90 tablet, Rfl: 3  •  hydrALAZINE (APRESOLINE) 50 MG tablet, Take 1 tablet by mouth Daily., Disp: 90 tablet, Rfl: 3  •  lamoTRIgine (LaMICtal) 25 MG tablet, Take 25 mg by mouth Daily., Disp: , Rfl:   •  levothyroxine (SYNTHROID, LEVOTHROID) 88 MCG tablet, Take 1 tablet by mouth Daily., Disp: 90 tablet, Rfl: 3  •  memantine (NAMENDA TITRATION PACK) 5 (28)-10 (21) MG tablet pack, Follow package directions., Disp: , Rfl: 12  •  olmesartan (BENICAR) 40 MG tablet, Take 1 tablet by mouth Daily., Disp: 90 tablet, Rfl: 3  •  potassium chloride (KLOR-CON) 10 MEQ CR tablet, Take 1 tablet by mouth Daily., Disp: 90 tablet, Rfl: 3  •  pravastatin (PRAVACHOL) 40 MG tablet, Take 1  "tablet by mouth Daily., Disp: 90 tablet, Rfl: 3  •  QUEtiapine (SEROquel) 25 MG tablet, Take 25 mg by mouth Every Night., Disp: , Rfl:   •  spironolactone-hydrochlorothiazide (ALDACTAZIDE) 25-25 MG tablet, Take 1 tablet by mouth Daily., Disp: , Rfl:   •  timolol (BETIMOL) 0.5 % ophthalmic solution, Apply  to eye., Disp: , Rfl:   •  travoprost, TA free, (TRAVATAN Z) 0.004 % solution ophthalmic solution, Apply  to eye., Disp: , Rfl:   •  traZODone (DESYREL) 100 MG tablet, Take 1 tablet by mouth Every Night., Disp: 90 tablet, Rfl: 3    Current Facility-Administered Medications:   •  cyanocobalamin injection 1,000 mcg, 1,000 mcg, Intramuscular, Q28 Days, Katarzyna Griggs MD, 1,000 mcg at 10/03/16 1518    Allergies   Allergen Reactions   • Aricept [Donepezil Hcl]      confusion   • Aspirin    • Metformin        Blood pressure 140/66, pulse 60, temperature 97.2 °F (36.2 °C), resp. rate 16, height 165.1 cm (65\"), weight 100 kg (221 lb).    Physical Exam   Constitutional: She is oriented to person, place, and time. She appears well-developed and well-nourished. No distress.   HENT:   Head: Normocephalic and atraumatic.   Right Ear: Hearing and external ear normal.   Left Ear: Hearing and external ear normal.   Nose: Nose normal.   Mouth/Throat: Oropharynx is clear and moist and mucous membranes are normal. Mucous membranes are not pale, not dry and not cyanotic. No oral lesions. No oropharyngeal exudate.   Eyes: Conjunctivae and EOM are normal. Right eye exhibits no discharge. Left eye exhibits no discharge. No scleral icterus.   Neck: Trachea normal. Neck supple. No JVD present. No edema present. No thyroid mass and no thyromegaly present.   Cardiovascular: Normal rate, regular rhythm, S2 normal and normal heart sounds.  Exam reveals no gallop, no S3 and no friction rub.    No murmur heard.  Pulmonary/Chest: Effort normal and breath sounds normal. No respiratory distress. She has no wheezes. She has no rales. She exhibits " no tenderness.   Abdominal: Soft. Normal appearance and bowel sounds are normal. She exhibits no distension, no ascites and no mass. There is no splenomegaly or hepatomegaly. There is no tenderness. There is no rigidity, no rebound and no guarding. No hernia.   Musculoskeletal: She exhibits no tenderness or deformity.     Vascular Status -  Her right foot exhibits no edema. Her left foot exhibits no edema.  Lymphadenopathy:     She has no cervical adenopathy.        Left: No supraclavicular adenopathy present.   Neurological: She is alert and oriented to person, place, and time. She has normal strength. No cranial nerve deficit or sensory deficit. She exhibits normal muscle tone. Coordination normal.   Skin: No rash noted. She is not diaphoretic. No cyanosis. No pallor. Nails show no clubbing.   Psychiatric: She has a normal mood and affect. Her behavior is normal. Judgment and thought content normal.   Nursing note and vitals reviewed.  Stigmata of chronic liver disease:  None.  Asterixis:  None.    Laboratory Testing:  Upon review of medical records:    Dated September 18, 2017 sodium 138 potassium 4.3 chloride 102 CO2 27 BUN 20 serum creatinine 0.90 glucose 213.  Calcium 9.4.  Total protein 7.7.  Albumin 4.30.  T bili 1.3 AST 39 ALT 50 alkaline phosphatase 64.  Total cholesterol 228.  Triglycerides 69.  TSH 3.330.  Hemoglobin A1c 7.20.  Vitamin B12 level 581.  WBC 7.16 hemoglobin 15.4 hematocrit 47.9 MCV 95.0 platelet count 142.    Procedures:  Upon review of medical records:    Dated January 16, 2014 the patient underwent a colonoscopy to the terminal ileum which revealed: Pandiverticulosis, erosion within the left colon at what appears to be an anastomotic site, colon polyp, and internal hemorrhoids.  Sigmoid colon anastomosis, biopsy revealed minimal nonspecific chronic inflammation and reactive glandular changes.  Negative for malignancy.  Cecal polyp, biopsy revealed inflammatory  "polyp.            Assessment:      ICD-10-CM ICD-9-CM   1. Dysphagia, unspecified type R13.10 787.20   2. Diarrhea, unspecified type R19.7 787.91         Discussion:  1.     Plan/  Patient Instructions   1. The patient should eat relatively soft diet.  The patient is trying to observe low lactose diet as well.  2. The patient should eat in upright position and chew well.  The patient should drink water after to 3 bites and take medications with liberal amounts of water.    3. Generally,  medications that have a potential to cause pill esophagitis may be avoided or used in an alternative form. For example Motrin or Aleve can be taken in a gel cap form.  4. After eating and taking medications the patient should remain in upright position for 10-15 minutes.  5. Upper endoscopy (EGD) counseling:  Description of the procedure, risks, benefits, alternatives and options including nonoperative options were discussed with the patient in detail.  The patient understands and wishes to proceed.  6. The patient should try to avoid \"sugar free candies\".       Yunior Acosta MD  "

## 2018-10-23 NOTE — PATIENT INSTRUCTIONS
"1. The patient should eat relatively soft diet.  The patient is trying to observe low lactose diet as well.  2. The patient should eat in upright position and chew well.  The patient should drink water after to 3 bites and take medications with liberal amounts of water.    3. Generally,  medications that have a potential to cause pill esophagitis may be avoided or used in an alternative form. For example Motrin or Aleve can be taken in a gel cap form.  4. After eating and taking medications the patient should remain in upright position for 10-15 minutes.  5. Upper endoscopy (EGD) counseling:  Description of the procedure, risks, benefits, alternatives and options including nonoperative options were discussed with the patient in detail.  The patient understands and wishes to proceed.  6. The patient should try to avoid \"sugar free candies\".  "

## 2018-10-25 ENCOUNTER — FLU SHOT (OUTPATIENT)
Dept: INTERNAL MEDICINE | Facility: CLINIC | Age: 83
End: 2018-10-25

## 2018-10-25 PROCEDURE — G0008 ADMIN INFLUENZA VIRUS VAC: HCPCS | Performed by: PHYSICIAN ASSISTANT

## 2018-10-25 PROCEDURE — 90662 IIV NO PRSV INCREASED AG IM: CPT | Performed by: PHYSICIAN ASSISTANT

## 2018-10-30 ENCOUNTER — PREP FOR SURGERY (OUTPATIENT)
Dept: OTHER | Facility: HOSPITAL | Age: 83
End: 2018-10-30

## 2018-10-30 DIAGNOSIS — R13.10 DYSPHAGIA, UNSPECIFIED TYPE: Primary | ICD-10-CM

## 2018-10-30 RX ORDER — SODIUM CHLORIDE 9 MG/ML
70 INJECTION, SOLUTION INTRAVENOUS CONTINUOUS PRN
Status: CANCELLED | OUTPATIENT
Start: 2018-10-30

## 2018-11-01 PROBLEM — R13.10 DYSPHAGIA: Status: ACTIVE | Noted: 2018-11-01

## 2018-11-08 ENCOUNTER — ANESTHESIA (OUTPATIENT)
Dept: GASTROENTEROLOGY | Facility: HOSPITAL | Age: 83
End: 2018-11-08

## 2018-11-08 ENCOUNTER — HOSPITAL ENCOUNTER (OUTPATIENT)
Facility: HOSPITAL | Age: 83
Setting detail: HOSPITAL OUTPATIENT SURGERY
Discharge: HOME | End: 2018-11-08
Attending: INTERNAL MEDICINE | Admitting: INTERNAL MEDICINE

## 2018-11-08 ENCOUNTER — ANESTHESIA EVENT (OUTPATIENT)
Dept: GASTROENTEROLOGY | Facility: HOSPITAL | Age: 83
End: 2018-11-08

## 2018-11-08 VITALS
SYSTOLIC BLOOD PRESSURE: 151 MMHG | HEIGHT: 65 IN | HEART RATE: 82 BPM | WEIGHT: 221 LBS | OXYGEN SATURATION: 99 % | DIASTOLIC BLOOD PRESSURE: 74 MMHG | RESPIRATION RATE: 17 BRPM | BODY MASS INDEX: 36.82 KG/M2 | TEMPERATURE: 96.9 F

## 2018-11-08 DIAGNOSIS — R13.10 DYSPHAGIA, UNSPECIFIED TYPE: ICD-10-CM

## 2018-11-08 LAB — GLUCOSE BLDC GLUCOMTR-MCNC: 90 MG/DL (ref 70–130)

## 2018-11-08 PROCEDURE — C1726 CATH, BAL DIL, NON-VASCULAR: HCPCS | Performed by: INTERNAL MEDICINE

## 2018-11-08 PROCEDURE — 25010000002 ONDANSETRON PER 1 MG: Performed by: NURSE ANESTHETIST, CERTIFIED REGISTERED

## 2018-11-08 PROCEDURE — 82962 GLUCOSE BLOOD TEST: CPT

## 2018-11-08 PROCEDURE — 43249 ESOPH EGD DILATION <30 MM: CPT | Performed by: INTERNAL MEDICINE

## 2018-11-08 PROCEDURE — 43239 EGD BIOPSY SINGLE/MULTIPLE: CPT | Performed by: INTERNAL MEDICINE

## 2018-11-08 PROCEDURE — 25010000002 PROPOFOL 200 MG/20ML EMULSION: Performed by: NURSE ANESTHETIST, CERTIFIED REGISTERED

## 2018-11-08 RX ORDER — ONDANSETRON 2 MG/ML
INJECTION INTRAMUSCULAR; INTRAVENOUS AS NEEDED
Status: DISCONTINUED | OUTPATIENT
Start: 2018-11-08 | End: 2018-11-08 | Stop reason: SURG

## 2018-11-08 RX ORDER — SODIUM CHLORIDE 9 MG/ML
70 INJECTION, SOLUTION INTRAVENOUS CONTINUOUS PRN
Status: DISCONTINUED | OUTPATIENT
Start: 2018-11-08 | End: 2018-11-08 | Stop reason: HOSPADM

## 2018-11-08 RX ORDER — SODIUM CHLORIDE 0.9 % (FLUSH) 0.9 %
3 SYRINGE (ML) INJECTION AS NEEDED
Status: DISCONTINUED | OUTPATIENT
Start: 2018-11-08 | End: 2018-11-08 | Stop reason: HOSPADM

## 2018-11-08 RX ORDER — PANTOPRAZOLE SODIUM 40 MG/10ML
40 INJECTION, POWDER, LYOPHILIZED, FOR SOLUTION INTRAVENOUS ONCE
Status: COMPLETED | OUTPATIENT
Start: 2018-11-08 | End: 2018-11-08

## 2018-11-08 RX ORDER — PROPOFOL 10 MG/ML
INJECTION, EMULSION INTRAVENOUS AS NEEDED
Status: DISCONTINUED | OUTPATIENT
Start: 2018-11-08 | End: 2018-11-08 | Stop reason: SURG

## 2018-11-08 RX ORDER — PANTOPRAZOLE SODIUM 40 MG/1
TABLET, DELAYED RELEASE ORAL
Qty: 30 TABLET | Refills: 2 | Status: SHIPPED | OUTPATIENT
Start: 2018-11-08 | End: 2019-01-28 | Stop reason: SDUPTHER

## 2018-11-08 RX ADMIN — PANTOPRAZOLE SODIUM 40 MG: 40 INJECTION, POWDER, FOR SOLUTION INTRAVENOUS at 08:44

## 2018-11-08 RX ADMIN — PROPOFOL 50 MG: 10 INJECTION, EMULSION INTRAVENOUS at 08:01

## 2018-11-08 RX ADMIN — PROPOFOL 50 MG: 10 INJECTION, EMULSION INTRAVENOUS at 07:58

## 2018-11-08 RX ADMIN — PROPOFOL 50 MG: 10 INJECTION, EMULSION INTRAVENOUS at 07:54

## 2018-11-08 RX ADMIN — PROPOFOL 50 MG: 10 INJECTION, EMULSION INTRAVENOUS at 07:50

## 2018-11-08 RX ADMIN — SODIUM CHLORIDE 70 ML/HR: 9 INJECTION, SOLUTION INTRAVENOUS at 06:50

## 2018-11-08 RX ADMIN — ONDANSETRON 4 MG: 2 INJECTION INTRAMUSCULAR; INTRAVENOUS at 07:42

## 2018-11-08 RX ADMIN — LIDOCAINE HYDROCHLORIDE 60 MG: 20 INJECTION, SOLUTION INTRAVENOUS at 07:40

## 2018-11-08 NOTE — OP NOTE
PROCEDURE:  Upper Endoscopy with radial dilation of the mid and distal esophagus using 15-16.5-18 mm CRE balloon to 18 mm and biopsies.    DATE OF PROCEDURE: November 8, 2018.    REFERRING PROVIDER:  Katarzyna Griggs MD.     INSTRUMENT:   Olympus GIF H 190 video endoscope     INDICATIONS OF THE PROCEDURE:  This is an 83-year-old -American female with history of dysphagia. Currently undergoing upper endoscopy for further evaluation.       BIOPSIES: Gastric antrum and greater curvature-severe erosive gastritis.      MEDICATIONS:  MAC.     PHOTOGRAPHS:  Photographs were included in the medical records.     CONSENT/PREPROCEDURE EVALUATION:  Risks, benefits, alternatives and options of the procedure including risks of anesthesia/sedation were discussed and informed consent was obtained prior to the procedure. History and physical examination were performed and nothing precluded the test.     REPORT:  The patient was placed in left lateral decubitus position. Once under the influence of IV sedation, the instrument was inserted into the mouth and esophagus was intubated under direct vision without difficulty.     Esophagus: Upper esophageal sphincter was noted to be somewhat tortuous. Z line was noted to be around 35  cm. Erythematous distal esophagitis was seen. Subtle rings were noted within the mid and distal esophagus. A nonobstructive Schatzki's-type ring was also seen. Tortuosity and tertiary contractions of esophageal body were noted. No Mariano's esophagus was seen.  A small sliding hiatal hernia less than 3 cm was noted.       Stomach:  Antrum:  Erythematous-severe erosive gastritis.  Angulus, lesser and greater curves: Normal.  Retroflex examination: Sliding hiatal hernia.  Cardia and fundus:  Normal.     Body of the stomach: Erythematous-erosive gastritis.  Good distensibility of the stomach was achieved no giant folds were noted.   Biopsies were obtained from the gastric antrum,  body and  fundus.    Pylorus and pyloric channel:  normal.     Duodenum:  Bulb: Erythematous-erosive bulbar duodenitis.  Second portion: normal.  No scalloping was seen in the second portion of duodenum.      Intervention:  Distal and then middle esophagus were dilated using 15-16.5-18 mm CRE balloon to 18 mm under vision without difficulty.  Some blood was noted no active bleeding was seen.       The upper GI tract was decompressed and the scope was pulled out of the patient. The patient tolerated the procedure well.     DIAGNOSES:     1. Erythematous distal esophagitis.  2. Subtle concentric rings involving the mid and distal esophagus. Status post serial dilation to 18 mm.  3. Nonobstructive Schatzki's-type ring.  4. Small sliding hiatal hernia.  5. Erythematous-erosive gastritis.   6. Erosive duodenitis.  7. Gastric deformity.    RECOMMENDATIONS:  1.  Dietary instructions.  2.  Pantoprazole 40 mg 1 p.o. q.a.m. 1/2 hour before breakfast.  3.  Follow biopsies.  4.  Follow up in office.     Thank you very much for letting me participate in the care of this patient. Please do not hesitate to call me if you have any questions.

## 2018-11-08 NOTE — INTERVAL H&P NOTE
"  H&P reviewed. The patient was examined and there are no changes to the H&P.       No recent shortness of breath or chest pains.      Blood pressure 173/61, pulse 63, temperature 97.8 °F (36.6 °C), temperature source Temporal Artery , resp. rate 18, height 165.1 cm (65\"), weight 100 kg (221 lb), SpO2 95 %, not currently breastfeeding.    Chest: clear to auscultation.  Cardiac exam: No S3 no murmurs.   Abdomen: soft bowel sounds present nondistended nontender.        "

## 2018-11-08 NOTE — DISCHARGE INSTRUCTIONS
Please follow all post op instructions and follow up appointment time from your physician's office included in your discharge packet.  .   No pushing, pulling, tugging,  heavy lifting, or strenuous activity.  No major decision making, driving, or drinking alcoholic beverages for 24 hours. ( due to the medications you have  received)  Always use good hand hygiene/washing techniques.  NO driving while taking pain medications.    To assist you in voiding:  Drink plenty of fluids  Listen to running water while attempting to void.    If you are unable to urinate and you have an uncomfortable urge to void or it has been   6 hours since you were discharged, return to the Emergency Room    ***********************************************************************************    Postprocedure instructions:  1. Nothing by mouth until fully alert and as specified below .  2. Bedrest until fully alert.  3. Vital signs as routine.    Diet:   1. Nothing by mouth for 60 minutes, then if no chest pains the patient may have Clear liquids diet (No Sodas) for 4 hours.  2. May advance to soft diet in 4 hours if no chest pains, Fever or chills, nausea vomiting or bleeding.    Other instructions:  1. The patient may eat in upright position, chew well, take small bites and take medications in upright position.   2. The patient should drink water after 3-4 bites, and liberally with medications.   3. The patient should remain upright for about 10 minutes after eating and taking oral medications.        Follow-up:    DR. EVANS ACOSTA in 4 weeks.Office phone # [phone].      ********************************************************************************************************************************************************    Notes to the patient and the family from Dr. Acosta.     Dear patient/family member,    Findings on today's procedure are as follows:    1. Esophagitis. Inflammation of the esophagus.  2. Esophageal rings. Schatzki's  ring. These areas were stretched.   3. Inflammation of the stomach. Erosive Gastritis (these are tiny ulcers)  4. Small sliding hiatal hernia.  5. No cancer. No active large ulcers.     Recommendations:    Protonix (Pantoprazole) tablet 40 mg tablet. Take 1 tablet orally in the morning half an hour before eating every day.  Other instructions as above.      Should you have more questions please do not hesitate to talk to the nurse who can call me and let me talk to you.     I hope you feel better.    Yunior Acosta M.D., FACP, FACG.

## 2018-11-17 LAB
LAB AP CASE REPORT: NORMAL
PATH REPORT.FINAL DX SPEC: NORMAL

## 2018-11-28 ENCOUNTER — OFFICE VISIT (OUTPATIENT)
Dept: INTERNAL MEDICINE | Facility: CLINIC | Age: 83
End: 2018-11-28

## 2018-11-28 VITALS
SYSTOLIC BLOOD PRESSURE: 127 MMHG | WEIGHT: 225 LBS | BODY MASS INDEX: 38.62 KG/M2 | DIASTOLIC BLOOD PRESSURE: 47 MMHG | OXYGEN SATURATION: 97 % | RESPIRATION RATE: 16 BRPM | TEMPERATURE: 98 F | HEART RATE: 62 BPM

## 2018-11-28 DIAGNOSIS — I10 BENIGN ESSENTIAL HYPERTENSION: Primary | ICD-10-CM

## 2018-11-28 DIAGNOSIS — E11.9 CONTROLLED TYPE 2 DIABETES MELLITUS WITHOUT COMPLICATION, WITHOUT LONG-TERM CURRENT USE OF INSULIN (HCC): ICD-10-CM

## 2018-11-28 DIAGNOSIS — J20.9 ACUTE BRONCHITIS WITH BRONCHOSPASM: ICD-10-CM

## 2018-11-28 PROCEDURE — 99214 OFFICE O/P EST MOD 30 MIN: CPT | Performed by: INTERNAL MEDICINE

## 2018-11-28 RX ORDER — METHYLPREDNISOLONE 4 MG/1
TABLET ORAL
Qty: 21 TABLET | Refills: 0 | Status: SHIPPED | OUTPATIENT
Start: 2018-11-28 | End: 2018-12-13

## 2018-11-28 NOTE — PROGRESS NOTES
Subjective   Valerie Davila is a 83 y.o. female.     Chief Complaint   Patient presents with   • Hypertension   • Bronchitis     Follow up from Urgent Care   • Cough   • Wheezing       History of Present Illness   HPI: Patient is here to follow up on the blood pressure  The patient is taking the blood pressure medications as prescribed and has had no side effects.  The patient is also here to follow on sugar  The patient also  Complains of cough with wheezing and was seen in the urgent care on 11-24-18 and is on doxycycline, her radiology ,  labs and urgent care records have been reviewed today , she got her flu shot   Hypertension   Pertinent negatives include no chest pain, palpitations or shortness of breath.   Hyperlipidemia   Pertinent negatives include no chest pain or shortness of breath.   Diabetes   Pertinent negatives for hypoglycemia include no dizziness, nervousness/anxiousness or pallor. Pertinent negatives for diabetes include no chest pain, no shortness of breath  Patient is on acei/arb       The following portions of the patient's history were reviewed and updated as appropriate: allergies, current medications, past family history, past medical history, past social history, past surgical history and problem list.    Review of Systems   Constitutional: Negative for appetite change, fatigue and fever.   HENT: Negative for congestion, ear discharge, ear pain, sinus pressure and sore throat.    Eyes: Negative for pain and discharge.   Respiratory: Positive for cough and wheezing. Negative for chest tightness and shortness of breath.    Cardiovascular: Negative for chest pain, palpitations and leg swelling.   Gastrointestinal: Negative for abdominal pain, blood in stool, constipation, diarrhea and nausea.   Endocrine: Negative for cold intolerance and heat intolerance.   Genitourinary: Negative for dysuria, flank pain and frequency.   Musculoskeletal: Negative for back pain and joint swelling.   Skin:  Negative for color change.   Allergic/Immunologic: Negative for environmental allergies and food allergies.   Neurological: Negative for dizziness, weakness, numbness and headaches.   Hematological: Negative for adenopathy. Does not bruise/bleed easily.   Psychiatric/Behavioral: Negative for behavioral problems and dysphoric mood. The patient is not nervous/anxious.          Current Outpatient Medications:   •  albuterol (PROVENTIL HFA;VENTOLIN HFA) 108 (90 Base) MCG/ACT inhaler, Inhale 2 puffs Every 4 (Four) Hours As Needed for Wheezing., Disp: 1 inhaler, Rfl: 0  •  carvedilol (COREG) 25 MG tablet, Take 25 mg by mouth 2 (Two) Times a Day With Meals., Disp: , Rfl:   •  doxycycline (DORYX) 100 MG enteric coated tablet, Take 1 tablet by mouth 2 (Two) Times a Day., Disp: 14 tablet, Rfl: 0  •  escitalopram (LEXAPRO) 20 MG tablet, Take 1 tablet by mouth Daily., Disp: 90 tablet, Rfl: 3  •  furosemide (LASIX) 20 MG tablet, Take 20 mg by mouth Daily As Needed (SWELLING)., Disp: , Rfl:   •  glimepiride (AMARYL) 2 MG tablet, Take 1 tablet by mouth Daily., Disp: 90 tablet, Rfl: 3  •  hydrALAZINE (APRESOLINE) 50 MG tablet, Take 1 tablet by mouth Daily., Disp: 90 tablet, Rfl: 3  •  lamoTRIgine (LaMICtal) 25 MG tablet, Take 25 mg by mouth Daily., Disp: , Rfl:   •  levothyroxine (SYNTHROID, LEVOTHROID) 88 MCG tablet, Take 1 tablet by mouth Daily., Disp: 90 tablet, Rfl: 3  •  memantine (NAMENDA TITRATION PACK) 5 (28)-10 (21) MG tablet pack, Follow package directions. (Patient taking differently: Take 10 mg by mouth 2 (Two) Times a Day. Follow package directions.), Disp: , Rfl: 12  •  olmesartan (BENICAR) 40 MG tablet, Take 1 tablet by mouth Daily., Disp: 90 tablet, Rfl: 3  •  pantoprazole (PROTONIX) 40 MG EC tablet, Take 1 tablet by mouth 30 minutes before breakfast daily., Disp: 30 tablet, Rfl: 2  •  potassium chloride (KLOR-CON) 10 MEQ CR tablet, Take 1 tablet by mouth Daily. (Patient taking differently: Take 10 mEq by mouth 2  (Two) Times a Week. DAUGHTER REPORTS PATIENT TAKES ON Monday AD Friday), Disp: 90 tablet, Rfl: 3  •  pravastatin (PRAVACHOL) 40 MG tablet, Take 1 tablet by mouth Daily., Disp: 90 tablet, Rfl: 3  •  QUEtiapine (SEROquel) 25 MG tablet, Take 25 mg by mouth Every Night., Disp: , Rfl:   •  spironolactone-hydrochlorothiazide (ALDACTAZIDE) 25-25 MG tablet, Take 1 tablet by mouth Daily., Disp: , Rfl:   •  timolol (BETIMOL) 0.5 % ophthalmic solution, Administer 1 drop to both eyes Daily., Disp: , Rfl:   •  travoprost, TA free, (TRAVATAN Z) 0.004 % solution ophthalmic solution, Administer 1 drop to both eyes Daily., Disp: , Rfl:   •  traZODone (DESYREL) 100 MG tablet, Take 1 tablet by mouth Every Night., Disp: 90 tablet, Rfl: 3  •  MethylPREDNISolone (MEDROL, RAMO,) 4 MG tablet, Take as directed on package instructions., Disp: 21 tablet, Rfl: 0    Current Facility-Administered Medications:   •  cyanocobalamin injection 1,000 mcg, 1,000 mcg, Intramuscular, Q28 Days, Katarzyna Griggs MD, 1,000 mcg at 10/03/16 1518    Objective     Blood pressure 127/47, pulse 62, temperature 98 °F (36.7 °C), temperature source Temporal, resp. rate 16, weight 102 kg (225 lb), SpO2 97 %, not currently breastfeeding.    Physical Exam   Constitutional: She is oriented to person, place, and time. She appears well-developed and well-nourished. No distress.   HENT:   Head: Normocephalic and atraumatic.   Right Ear: External ear normal.   Left Ear: External ear normal.   Nose: Nose normal.   Mouth/Throat: Oropharynx is clear and moist.   Eyes: Conjunctivae and EOM are normal. Pupils are equal, round, and reactive to light.   Neck: Neck supple. No thyromegaly present.   Cardiovascular: Normal rate, regular rhythm and normal heart sounds.   Pulmonary/Chest: Effort normal. No respiratory distress. She has wheezes.   Abdominal: Soft. Bowel sounds are normal. She exhibits no distension. There is no tenderness. There is no rebound.   Musculoskeletal: Normal  range of motion. She exhibits no edema.   Lymphadenopathy:     She has no cervical adenopathy.   Neurological: She is alert and oriented to person, place, and time.   No gross motor or sensory deficits   Skin: Skin is warm. She is not diaphoretic.   Psychiatric: She has a normal mood and affect.   Nursing note and vitals reviewed.      Results for orders placed or performed during the hospital encounter of 11/08/18   POC Glucose Once   Result Value Ref Range    Glucose 90 70 - 130 mg/dL   Tissue Pathology Exam   Result Value Ref Range    Case Report       Surgical Pathology Report                         Case: [id number]                                  Authorizing Provider:  Yunior Acosta MD         Collected:           11/08/2018 07:52 AM          Ordering Location:     [facility]    Received:            11/08/2018 09:07 AM                                 [facility]                                                                    Pathologist:           Poncho Foss MD                                                     Specimen:    Gastric, Antrum, antrum and greater curvature biopsy                                       Final Diagnosis       See Scanned Report             Assessment/Plan   Valerie was seen today for hypertension, bronchitis, cough and wheezing.    Diagnoses and all orders for this visit:    Benign essential hypertension    Controlled type 2 diabetes mellitus without complication, without long-term current use of insulin (CMS/[facility])    Acute bronchitis with bronchospasm    Other orders  -     MethylPREDNISolone (MEDROL, RAMO,) 4 MG tablet; Take as directed on package instructions.        Plan:  1.  Benign essential hypertension: Will continue current medication, low-sodium diet advised  2.diabetes mellitus   :   reviewed  Labs , advised family steroid given today can increase her home blood sugar readings   4. Acute bronchitis ; chest xray reviewed, start medrol dose pack ,  continue antibiotics         Katarzyna Griggs MD

## 2018-12-03 ENCOUNTER — TELEPHONE (OUTPATIENT)
Dept: INTERNAL MEDICINE | Facility: CLINIC | Age: 83
End: 2018-12-03

## 2018-12-03 RX ORDER — POTASSIUM CHLORIDE 20MEQ/15ML
10 LIQUID (ML) ORAL DAILY
Qty: 240 ML | Refills: 6 | Status: SHIPPED | OUTPATIENT
Start: 2018-12-03 | End: 2018-12-13

## 2018-12-03 NOTE — TELEPHONE ENCOUNTER
[facility] pharmacy called regarding Ms. Davila. They state that the patient is having trouble swallowing her potassium pill and they are requesting a potassium liquid be sent to the pharmacy

## 2018-12-05 ENCOUNTER — TELEPHONE (OUTPATIENT)
Dept: INTERNAL MEDICINE | Facility: CLINIC | Age: 83
End: 2018-12-05

## 2018-12-05 NOTE — TELEPHONE ENCOUNTER
Shan from [facility] pharmacy called regarding Mrs. Davila. He states that the liquid potassium is to expensive and the patient's family has requested a dissolvable tablet be called in.

## 2018-12-13 ENCOUNTER — OFFICE VISIT (OUTPATIENT)
Dept: GASTROENTEROLOGY | Facility: CLINIC | Age: 83
End: 2018-12-13

## 2018-12-13 VITALS
RESPIRATION RATE: 16 BRPM | HEIGHT: 65 IN | DIASTOLIC BLOOD PRESSURE: 54 MMHG | HEART RATE: 67 BPM | SYSTOLIC BLOOD PRESSURE: 137 MMHG | WEIGHT: 219 LBS | BODY MASS INDEX: 36.49 KG/M2 | TEMPERATURE: 98 F

## 2018-12-13 DIAGNOSIS — K57.30 DIVERTICULOSIS OF COLON: ICD-10-CM

## 2018-12-13 DIAGNOSIS — K59.00 CONSTIPATION, UNSPECIFIED CONSTIPATION TYPE: ICD-10-CM

## 2018-12-13 DIAGNOSIS — K62.5 BRBPR (BRIGHT RED BLOOD PER RECTUM): ICD-10-CM

## 2018-12-13 DIAGNOSIS — R19.7 DIARRHEA, UNSPECIFIED TYPE: ICD-10-CM

## 2018-12-13 DIAGNOSIS — K21.00 GASTROESOPHAGEAL REFLUX DISEASE WITH ESOPHAGITIS: ICD-10-CM

## 2018-12-13 DIAGNOSIS — R13.19 OTHER DYSPHAGIA: Primary | ICD-10-CM

## 2018-12-13 PROCEDURE — 99214 OFFICE O/P EST MOD 30 MIN: CPT | Performed by: INTERNAL MEDICINE

## 2018-12-13 NOTE — PATIENT INSTRUCTIONS
1. The patient should eat relatively soft diet.  The patient is trying to observe low lactose diet as well. The patient may drink liberal amounts of water.  2. The patient should eat in upright position and chew well.  The patient should drink water after to 3 bites and take medications with liberal amounts of water.    3. Generally,  medications that have a potential to cause pill esophagitis may be avoided or used in an alternative form. For example Motrin or Aleve can be taken in a gel cap form.  4. After eating and taking medications the patient should remain in upright position for 10-15 minutes.    5. Protonix (Pantoprazole) tablet 40 mg tablet. Take 1 tablet orally in the morning half an hour before eating every day.     6. Hemorrhoid care.  7. Generally it is recommended that the patient should undergo a colonoscopy for further evaluation. This was discussed with the patient, and her family. They will further discuss it with her daughter who is involved in decision making. They will get back to us.  8. Otherwise follow-up on an as-needed basis.

## 2018-12-13 NOTE — PROGRESS NOTES
Chief Complaint   Patient presents with   • Difficulty Swallowing     History of Present Illness     The patient has history of recurrent difficulty swallowing since August 2018. The symptom was moderately severe and occurred 2-3 times a week and was mostly associated with solid foods. The patient had choking spells. She would point towards the lower substernal area. The patient did not have associated weight loss. She had undergone an upper endoscopy in November 2018. She was found to have concentric rings involving the mid and distal esophagus as well as nonobstructive Schatzki's-type ring. The patient underwent serial dilation of the mid and distal esophagus to 18 mm. Since then the patient feels much better. She denies further dysphagia. Her reflux is under control. The patient denies diarrhea.  She has has occasional constipation that may last for a day or so.  There is history of occasional small amounts of bright red blood per rectum. There is no hematemesis or melena.  She denies abdominal pain. There is no nausea or vomiting. There is no history of liver or pancreatic disease.     Review of Systems   Constitutional: Negative for appetite change, chills, fatigue, fever and unexpected weight change.   HENT: Negative for mouth sores, nosebleeds and trouble swallowing.    Eyes: Negative for discharge and redness.   Respiratory: Positive for cough. Negative for apnea and shortness of breath.    Cardiovascular: Negative for chest pain, palpitations and leg swelling.   Gastrointestinal: Positive for anal bleeding and constipation. Negative for abdominal distention, abdominal pain, blood in stool, diarrhea, nausea and vomiting.   Endocrine: Negative for cold intolerance, heat intolerance and polydipsia.   Genitourinary: Negative for dysuria, hematuria and urgency.   Musculoskeletal: Negative for arthralgias, joint swelling and myalgias.   Skin: Negative for rash.   Allergic/Immunologic: Negative for food allergies  "and immunocompromised state.   Neurological: Negative for dizziness, seizures, syncope and headaches.   Hematological: Negative for adenopathy. Does not bruise/bleed easily.   Psychiatric/Behavioral: Negative for dysphoric mood. The patient is not nervous/anxious and is not hyperactive.      Patient Active Problem List   Diagnosis   • Abnormal weight loss   • Acute bronchitis   • Alzheimer's dementia with behavioral disturbance   • Benign essential hypertension   • Dementia   • Type 2 diabetes mellitus (CMS/HCC)   • Dizziness   • Edema   • Gastroesophageal reflux disease with esophagitis   • Syncope   • Fatigue   • Hypothyroidism   • Memory loss   • Breast pain   • Proteinuria   • Multiple-type hyperlipidemia   • Subacute delirium   • Uncontrolled type 2 diabetes mellitus (CMS/HCC)   • Cobalamin deficiency   • Vitamin D deficiency   • Diverticulosis of colon   • Influenza vaccine needed   • Dysphagia     Past Medical History:   Diagnosis Date   • Abnormal weight loss    • Acute bronchitis    • Allergic rhinitis    • Anxiety    • Arthritis    • Benign neoplasm of large bowel     Colon polyp   • Body piercing     EARS   • Cellulitis    • Confusion     DAUGHTER REPORTS \"SHE'S A SUN DOWNER\"    • Diabetes (CMS/[facility]) 2018   • Diarrhea    • Diverticulitis    • Diverticulitis of colon    • Dizziness    • Dysphagia     DAUGHTER REPORTS SHE HAS NOTICED THAT PATIENT GETS CHOKED EASILY    • Early onset Alzheimer's disease with behavioral disturbance    • Edema     DAUGHTER REPORTS OF BLE'S   • Elevated cholesterol    • Enthesopathy of knee    • Esophageal reflux    • Fainting    • Full dentures     DAUGHTER INSTRUCTED NO ADHESIVES THE DOS   • Glaucoma    • Hearing loss     NO USE OF HEARING AIDS   • Hyperlipidemia    • Hypertension    • Hypothyroidism    • Knee pain    • Loss of memory    • Mastodynia    • Osteoarthritis of knee    • Polyp of colon    • Proteinuria    • Seasonal allergies    • Subacute delirium    • Vertigo    • " Vitamin D deficiency    • Wears glasses      Past Surgical History:   Procedure Laterality Date   • CATARACT EXTRACTION Bilateral    • COLONOSCOPY     • ENDOSCOPY N/A 2018    Procedure: ESOPHAGOGASTRODUODENOSCOPY WITH BIOPSY AND DILIATATION;  Surgeon: Yunior Acosta MD;  Location: [facility] ENDOSCOPY;  Service: Gastroenterology   • HYSTERECTOMY      and bilateral salpingo-oophorectomy   • JOINT REPLACEMENT Right 2013    KNEE REPLACEMENT   • MOUTH SURGERY      FULL MOUTH EXTRACTION     Family History   Problem Relation Age of Onset   • Pneumonia Mother         CAUSED HER DEATH IN HER 70'S   • Heart attack Father    • Stroke Other    • Colon cancer Neg Hx    • Cirrhosis Neg Hx    • Liver disease Neg Hx    • Liver cancer Neg Hx    • Stomach cancer Neg Hx    • Esophageal cancer Neg Hx    • Crohn's disease Neg Hx    • Ulcerative colitis Neg Hx      Social History     Tobacco Use   • Smoking status: Former Smoker     Packs/day: 0.50     Years: 25.00     Pack years: 12.50     Types: Cigarettes     Last attempt to quit:      Years since quittin.0   • Smokeless tobacco: Never Used   Substance Use Topics   • Alcohol use: No       Current Outpatient Medications:   •  albuterol (PROVENTIL HFA;VENTOLIN HFA) 108 (90 Base) MCG/ACT inhaler, Inhale 2 puffs Every 4 (Four) Hours As Needed for Wheezing., Disp: 1 inhaler, Rfl: 0  •  carvedilol (COREG) 25 MG tablet, Take 25 mg by mouth 2 (Two) Times a Day With Meals., Disp: , Rfl:   •  escitalopram (LEXAPRO) 20 MG tablet, Take 1 tablet by mouth Daily., Disp: 90 tablet, Rfl: 3  •  furosemide (LASIX) 20 MG tablet, Take 20 mg by mouth Daily As Needed (SWELLING)., Disp: , Rfl:   •  glimepiride (AMARYL) 2 MG tablet, Take 1 tablet by mouth Daily., Disp: 90 tablet, Rfl: 3  •  hydrALAZINE (APRESOLINE) 50 MG tablet, Take 1 tablet by mouth Daily., Disp: 90 tablet, Rfl: 3  •  lamoTRIgine (LaMICtal) 25 MG tablet, Take 25 mg by mouth Daily., Disp: , Rfl:   •  levothyroxine  (SYNTHROID, LEVOTHROID) 88 MCG tablet, Take 1 tablet by mouth Daily., Disp: 90 tablet, Rfl: 3  •  memantine (NAMENDA TITRATION PACK) 5 (28)-10 (21) MG tablet pack, Follow package directions. (Patient taking differently: Take 10 mg by mouth 2 (Two) Times a Day. Follow package directions.), Disp: , Rfl: 12  •  olmesartan (BENICAR) 40 MG tablet, Take 1 tablet by mouth Daily., Disp: 90 tablet, Rfl: 3  •  pantoprazole (PROTONIX) 40 MG EC tablet, Take 1 tablet by mouth 30 minutes before breakfast daily., Disp: 30 tablet, Rfl: 2  •  potassium chloride (KLOR-CON) 10 MEQ CR tablet, Take 1 tablet by mouth Daily. (Patient taking differently: Take 10 mEq by mouth 2 (Two) Times a Week. DAUGHTER REPORTS PATIENT TAKES ON Monday AD Friday), Disp: 90 tablet, Rfl: 3  •  pravastatin (PRAVACHOL) 40 MG tablet, Take 1 tablet by mouth Daily., Disp: 90 tablet, Rfl: 3  •  QUEtiapine (SEROquel) 25 MG tablet, Take 25 mg by mouth Every Night., Disp: , Rfl:   •  spironolactone-hydrochlorothiazide (ALDACTAZIDE) 25-25 MG tablet, Take 1 tablet by mouth Daily., Disp: , Rfl:   •  timolol (BETIMOL) 0.5 % ophthalmic solution, Administer 1 drop to both eyes Daily., Disp: , Rfl:   •  travoprost, TA free, (TRAVATAN Z) 0.004 % solution ophthalmic solution, Administer 1 drop to both eyes Daily., Disp: , Rfl:   •  traZODone (DESYREL) 100 MG tablet, Take 1 tablet by mouth Every Night., Disp: 90 tablet, Rfl: 3    Current Facility-Administered Medications:   •  cyanocobalamin injection 1,000 mcg, 1,000 mcg, Intramuscular, Q28 Days, Katarzyna Griggs MD, 1,000 mcg at 10/03/16 1518    Allergies   Allergen Reactions   • Aricept [Donepezil Hcl] Confusion   • Aspirin Other (See Comments)     DAUGHTER REPORTS HER MOTHER HAS ALWAYS LISTED AS AN ALLERGY BUT THAT SHE DOES NOT KNOW REACTION TYPE     • Lactose Intolerance (Gi) GI Intolerance   • Metformin Diarrhea   • Penicillins Unknown (See Comments)     Daughter reports patient has been allergic to this since she was a  "child and has not had it since so she is unsure what it done to her.       Blood pressure 137/54, pulse 67, temperature 98 °F (36.7 °C), resp. rate 16, height 165.1 cm (65\"), weight 99.3 kg (219 lb), not currently breastfeeding.    Physical Exam   Constitutional: She is oriented to person, place, and time. She appears well-developed and well-nourished. No distress.   HENT:   Head: Normocephalic and atraumatic.   Right Ear: Hearing and external ear normal.   Left Ear: Hearing and external ear normal.   Nose: Nose normal.   Mouth/Throat: Oropharynx is clear and moist and mucous membranes are normal. Mucous membranes are not pale, not dry and not cyanotic. No oral lesions. No oropharyngeal exudate.   Eyes: Conjunctivae and EOM are normal. Right eye exhibits no discharge. Left eye exhibits no discharge. No scleral icterus.   Neck: Trachea normal. Neck supple. No JVD present. No edema present. No thyroid mass and no thyromegaly present.   Cardiovascular: Normal rate, regular rhythm, S2 normal and normal heart sounds. Exam reveals no gallop, no S3 and no friction rub.   No murmur heard.  Pulmonary/Chest: Effort normal and breath sounds normal. No respiratory distress. She has no wheezes. She has no rales. She exhibits no tenderness.   Abdominal: Soft. Normal appearance and bowel sounds are normal. She exhibits no distension, no ascites and no mass. There is no splenomegaly or hepatomegaly. There is no tenderness. There is no rigidity, no rebound and no guarding. No hernia.   Musculoskeletal: She exhibits no tenderness or deformity.     Vascular Status -  Her right foot exhibits no edema. Her left foot exhibits no edema.  Lymphadenopathy:     She has no cervical adenopathy.        Left: No supraclavicular adenopathy present.   Neurological: She is alert and oriented to person, place, and time. She has normal strength. No cranial nerve deficit or sensory deficit. She exhibits normal muscle tone. Coordination normal.   Skin: " No rash noted. She is not diaphoretic. No cyanosis. No pallor. Nails show no clubbing.   Psychiatric: She has a normal mood and affect. Her behavior is normal. Judgment and thought content normal.   Nursing note and vitals reviewed.  Stigmata of chronic liver disease:  None.  Asterixis:  None.    Laboratory Testing:  Upon review of medical records:    Dated September 18, 2017 sodium 138 potassium 4.3 chloride 102 CO2 27 BUN 20 serum creatinine 0.90 glucose 213.  Calcium 9.4.  Total protein 7.7.  Albumin 4.30.  T bili 1.3 AST 39 ALT 50 alkaline phosphatase 64.  Total cholesterol 228.  Triglycerides 69.  TSH 3.330.  Hemoglobin A1c 7.20.  Vitamin B12 level 581.  WBC 7.16 hemoglobin 15.4 hematocrit 47.9 MCV 95.0 platelet count 142.     Dated August 24, 2018 sodium 137 potassium 5.3 chloride 104 CO2 28 BUN 28 serum creatinine 1.40 glucose 114.  Calcium 9.1.  Total protein 6.7.  Albumin 3.70.  T bili 0.8 AST 36 ALT 26 alkaline phosphatase 54.  Total cholesterol 201.  Triglycerides 70.  TSH 4.080.  Hemoglobin A1c 5.70.  WBC 7.03 hemoglobin 13.0 hematocrit 41.5 .5 and platelet count 130.    Procedures:  Upon review of medical records:     Dated January 16, 2014 the patient underwent a colonoscopy to the terminal ileum which revealed: Pandiverticulosis, erosion within the left colon at what appears to be an anastomotic site, colon polyp, and internal hemorrhoids.  Sigmoid colon anastomosis, biopsy revealed minimal nonspecific chronic inflammation and reactive glandular changes.  Negative for malignancy.  Cecal polyp, biopsy revealed inflammatory polyp.     Dated November 8, 2018 the patient underwent an upper endoscopy which revealed: Erythematous distal esophagitis.  Subtle concentric rings involving the mid and distal esophagus.  Status post serial dilation to 18 mm.  Nonobstructive Schatzki’s-type ring.  Small sliding hiatal hernia.  Erythematous-erosive gastritis.  Erosive duodenitis.  Gastric deformity.  Antrum  and greater curvature, biopsy revealed compatible with reactive gastropathy.    Assessment:      ICD-10-CM ICD-9-CM   1. Other dysphagia R13.19 787.29   2. Diarrhea, unspecified type R19.7 787.91   3. Diverticulosis of colon K57.30 562.10   4. Gastroesophageal reflux disease with esophagitis K21.0 530.11   5. Constipation, unspecified constipation type K59.00 564.00   6. BRBPR (bright red blood per rectum) K62.5 569.3         Discussion:  1.     Plan/  Patient Instructions   1. The patient should eat relatively soft diet.  The patient is trying to observe low lactose diet as well. The patient may drink liberal amounts of water.  2. The patient should eat in upright position and chew well.  The patient should drink water after to 3 bites and take medications with liberal amounts of water.    3. Generally,  medications that have a potential to cause pill esophagitis may be avoided or used in an alternative form. For example Motrin or Aleve can be taken in a gel cap form.  4. After eating and taking medications the patient should remain in upright position for 10-15 minutes.    5. Protonix (Pantoprazole) tablet 40 mg tablet. Take 1 tablet orally in the morning half an hour before eating every day.     6. Hemorrhoid care.  7. Generally it is recommended that the patient should undergo a colonoscopy for further evaluation. This was discussed with the patient, and her family. They will further discuss it with her daughter who is involved in decision making. They will get back to us.  8. Otherwise follow-up on an as-needed basis.         Yunior Acosta MD

## 2019-01-17 LAB
ALBUMIN SERPL-MCNC: 3.9 G/DL (ref 3.5–5)
ALBUMIN/GLOB SERPL: 1.3 G/DL (ref 1–2)
ALP SERPL-CCNC: 56 U/L (ref 38–126)
ALT SERPL-CCNC: 22 U/L (ref 13–69)
AST SERPL-CCNC: 27 U/L (ref 15–46)
BASOPHILS # BLD AUTO: 0.06 10*3/MM3 (ref 0–0.2)
BASOPHILS NFR BLD AUTO: 0.8 % (ref 0–2.5)
BILIRUB SERPL-MCNC: 0.8 MG/DL (ref 0.2–1.3)
BUN SERPL-MCNC: 20 MG/DL (ref 7–20)
BUN/CREAT SERPL: 14.3 (ref 7.1–23.5)
CALCIUM SERPL-MCNC: 9.3 MG/DL (ref 8.4–10.2)
CHLORIDE SERPL-SCNC: 103 MMOL/L (ref 98–107)
CHOLEST SERPL-MCNC: 203 MG/DL (ref 0–199)
CO2 SERPL-SCNC: 27 MMOL/L (ref 26–30)
CREAT SERPL-MCNC: 1.4 MG/DL (ref 0.6–1.3)
EOSINOPHIL # BLD AUTO: 0.21 10*3/MM3 (ref 0–0.7)
EOSINOPHIL NFR BLD AUTO: 2.7 % (ref 0–7)
ERYTHROCYTE [DISTWIDTH] IN BLOOD BY AUTOMATED COUNT: 12.4 % (ref 11.5–14.5)
GLOBULIN SER CALC-MCNC: 3.1 GM/DL
GLUCOSE SERPL-MCNC: 80 MG/DL (ref 74–98)
HBA1C MFR BLD: 5.7 %
HCT VFR BLD AUTO: 40.3 % (ref 37–47)
HDLC SERPL-MCNC: 74 MG/DL (ref 40–60)
HGB BLD-MCNC: 12.8 G/DL (ref 12–16)
IMM GRANULOCYTES # BLD AUTO: 0.05 10*3/MM3 (ref 0–0.06)
IMM GRANULOCYTES NFR BLD AUTO: 0.7 % (ref 0–0.6)
LDLC SERPL CALC-MCNC: 116 MG/DL (ref 0–99)
LYMPHOCYTES # BLD AUTO: 1.66 10*3/MM3 (ref 0.6–3.4)
LYMPHOCYTES NFR BLD AUTO: 21.6 % (ref 10–50)
MCH RBC QN AUTO: 31.4 PG (ref 27–31)
MCHC RBC AUTO-ENTMCNC: 31.8 G/DL (ref 30–37)
MCV RBC AUTO: 98.8 FL (ref 81–99)
MONOCYTES # BLD AUTO: 0.52 10*3/MM3 (ref 0–0.9)
MONOCYTES NFR BLD AUTO: 6.8 % (ref 0–12)
NEUTROPHILS # BLD AUTO: 5.18 10*3/MM3 (ref 2–6.9)
NEUTROPHILS NFR BLD AUTO: 67.4 % (ref 37–80)
NRBC BLD AUTO-RTO: 0 /100 WBC (ref 0–0)
PLATELET # BLD AUTO: 178 10*3/MM3 (ref 130–400)
POTASSIUM SERPL-SCNC: 5.6 MMOL/L (ref 3.5–5.1)
PROT SERPL-MCNC: 7 G/DL (ref 6.3–8.2)
RBC # BLD AUTO: 4.08 10*6/MM3 (ref 4.2–5.4)
SODIUM SERPL-SCNC: 136 MMOL/L (ref 137–145)
TRIGL SERPL-MCNC: 66 MG/DL
TSH SERPL DL<=0.005 MIU/L-ACNC: 1.41 MIU/ML (ref 0.47–4.68)
UNABLE TO VOID: NORMAL
VLDLC SERPL CALC-MCNC: 13.2 MG/DL
WBC # BLD AUTO: 7.68 10*3/MM3 (ref 4.8–10.8)

## 2019-01-22 ENCOUNTER — OFFICE VISIT (OUTPATIENT)
Dept: INTERNAL MEDICINE | Facility: CLINIC | Age: 84
End: 2019-01-22

## 2019-01-22 ENCOUNTER — HOSPITAL ENCOUNTER (OUTPATIENT)
Dept: GENERAL RADIOLOGY | Facility: HOSPITAL | Age: 84
Discharge: HOME | End: 2019-01-22
Admitting: INTERNAL MEDICINE

## 2019-01-22 DIAGNOSIS — E87.5 HYPERKALEMIA: ICD-10-CM

## 2019-01-22 DIAGNOSIS — E78.2 MIXED HYPERLIPIDEMIA: ICD-10-CM

## 2019-01-22 DIAGNOSIS — E11.9 CONTROLLED TYPE 2 DIABETES MELLITUS WITHOUT COMPLICATION, WITHOUT LONG-TERM CURRENT USE OF INSULIN (HCC): ICD-10-CM

## 2019-01-22 DIAGNOSIS — F02.80 LATE ONSET ALZHEIMER'S DISEASE WITHOUT BEHAVIORAL DISTURBANCE (HCC): ICD-10-CM

## 2019-01-22 DIAGNOSIS — R13.14 PHARYNGOESOPHAGEAL DYSPHAGIA: ICD-10-CM

## 2019-01-22 DIAGNOSIS — Z23 NEED FOR VACCINATION: ICD-10-CM

## 2019-01-22 DIAGNOSIS — G30.1 LATE ONSET ALZHEIMER'S DISEASE WITHOUT BEHAVIORAL DISTURBANCE (HCC): ICD-10-CM

## 2019-01-22 DIAGNOSIS — I10 BENIGN ESSENTIAL HYPERTENSION: Primary | ICD-10-CM

## 2019-01-22 DIAGNOSIS — N18.2 CHRONIC KIDNEY DISEASE, STAGE II (MILD): ICD-10-CM

## 2019-01-22 DIAGNOSIS — E03.8 ADULT ONSET HYPOTHYROIDISM: ICD-10-CM

## 2019-01-22 PROCEDURE — 99214 OFFICE O/P EST MOD 30 MIN: CPT | Performed by: INTERNAL MEDICINE

## 2019-01-22 PROCEDURE — 71046 X-RAY EXAM CHEST 2 VIEWS: CPT

## 2019-01-22 NOTE — PROGRESS NOTES
Subjective   Valerie Davila is a 83 y.o. female.     Chief Complaint   Patient presents with   • Hypertension   • Hyperlipidemia   • Diabetes   • Chronic Kidney Disease   • Difficulty Swallowing   • Memory Loss   • Hypothyroidism       History of Present Illness   HPI: Patient is here to follow up on the blood pressure  The patient is taking the blood pressure medications as prescribed and has had no side effects. The patient is also here to follow up on the cholesterol and is trying to follow a diet. The patient is also here to follow on sugar and  due to get lab work done . The patient also needs  prevnar .  Her daughter states she has  Dysphagia and is not eating and has lost weight  , she was seen by gi  A few months ago  And had egd , she coughs after eating , she also is here to follow up on  alzheimers dementia and  Saw seen by [facility]  Neurology  and is on namenda  10 mg bid , lexapro 20 mg qd  , trazodone 100mg qhs and seroquel 25 mg qhs . She needs prevnar  Hypertension   Pertinent negatives include no chest pain, palpitations or shortness of breath.   Hyperlipidemia   Pertinent negatives include no chest pain or shortness of breath.   Diabetes   Pertinent negatives for hypoglycemia include no dizziness, nervousness/anxiousness or pallor. Pertinent negatives for diabetes include no chest pain, no shortness of breath  Patient is on acei/arb     The following portions of the patient's history were reviewed and updated as appropriate: allergies, current medications, past family history, past medical history, past social history, past surgical history and problem list.    Review of Systems   Constitutional: Positive for unexpected weight change. Negative for appetite change, fatigue and fever.   HENT: Negative for congestion, ear discharge, ear pain, sinus pressure and sore throat.    Eyes: Negative for pain and discharge.   Respiratory: Negative for cough, chest tightness, shortness of breath and wheezing.     Cardiovascular: Negative for chest pain, palpitations and leg swelling.   Gastrointestinal: Negative for abdominal pain, blood in stool, constipation, diarrhea and nausea.        Dysphagia   Endocrine: Negative for cold intolerance and heat intolerance.   Genitourinary: Negative for dysuria, flank pain and frequency.   Musculoskeletal: Negative for back pain and joint swelling.   Skin: Negative for color change.   Allergic/Immunologic: Negative for environmental allergies and food allergies.   Neurological: Negative for dizziness, weakness, numbness and headaches.   Hematological: Negative for adenopathy. Does not bruise/bleed easily.   Psychiatric/Behavioral: Positive for decreased concentration. Negative for behavioral problems and dysphoric mood. The patient is not nervous/anxious.          Current Outpatient Medications:   •  albuterol (PROVENTIL HFA;VENTOLIN HFA) 108 (90 Base) MCG/ACT inhaler, Inhale 2 puffs Every 4 (Four) Hours As Needed for Wheezing., Disp: 1 inhaler, Rfl: 0  •  carvedilol (COREG) 25 MG tablet, Take 25 mg by mouth 2 (Two) Times a Day With Meals., Disp: , Rfl:   •  escitalopram (LEXAPRO) 20 MG tablet, Take 1 tablet by mouth Daily., Disp: 90 tablet, Rfl: 3  •  hydrALAZINE (APRESOLINE) 50 MG tablet, Take 1 tablet by mouth Daily., Disp: 90 tablet, Rfl: 3  •  lamoTRIgine (LaMICtal) 25 MG tablet, Take 25 mg by mouth Daily., Disp: , Rfl:   •  levothyroxine (SYNTHROID, LEVOTHROID) 88 MCG tablet, Take 1 tablet by mouth Daily., Disp: 90 tablet, Rfl: 3  •  memantine (NAMENDA TITRATION PACK) 5 (28)-10 (21) MG tablet pack, Follow package directions. (Patient taking differently: Take 10 mg by mouth 2 (Two) Times a Day. Follow package directions.), Disp: , Rfl: 12  •  olmesartan (BENICAR) 40 MG tablet, Take 1 tablet by mouth Daily., Disp: 90 tablet, Rfl: 3  •  pantoprazole (PROTONIX) 40 MG EC tablet, Take 1 tablet by mouth 30 minutes before breakfast daily., Disp: 30 tablet, Rfl: 2  •  pravastatin  (PRAVACHOL) 40 MG tablet, Take 1 tablet by mouth Daily., Disp: 90 tablet, Rfl: 3  •  QUEtiapine (SEROquel) 25 MG tablet, Take 25 mg by mouth Every Night., Disp: , Rfl:   •  spironolactone-hydrochlorothiazide (ALDACTAZIDE) 25-25 MG tablet, Take 1 tablet by mouth Daily., Disp: , Rfl:   •  timolol (BETIMOL) 0.5 % ophthalmic solution, Administer 1 drop to both eyes Daily., Disp: , Rfl:   •  travoprost, TA free, (TRAVATAN Z) 0.004 % solution ophthalmic solution, Administer 1 drop to both eyes Daily., Disp: , Rfl:   •  traZODone (DESYREL) 100 MG tablet, Take 1 tablet by mouth Every Night., Disp: 90 tablet, Rfl: 3    Current Facility-Administered Medications:   •  cyanocobalamin injection 1,000 mcg, 1,000 mcg, Intramuscular, Q28 Days, Katarzyna Griggs MD, 1,000 mcg at 10/03/16 1518    Objective     not currently breastfeeding.    Physical Exam   Constitutional: She is oriented to person, place, and time. She appears well-developed and well-nourished. No distress.   HENT:   Head: Normocephalic and atraumatic.   Right Ear: External ear normal.   Left Ear: External ear normal.   Nose: Nose normal.   Mouth/Throat: Oropharynx is clear and moist.   Eyes: Conjunctivae and EOM are normal. Pupils are equal, round, and reactive to light.   Neck: Neck supple. No thyromegaly present.   Cardiovascular: Normal rate, regular rhythm and normal heart sounds.   Pulmonary/Chest: Effort normal and breath sounds normal. No respiratory distress.   Abdominal: Soft. Bowel sounds are normal. She exhibits no distension. There is no tenderness. There is no rebound.   Musculoskeletal: Normal range of motion. She exhibits no edema.   Lymphadenopathy:     She has no cervical adenopathy.   Neurological: She is alert and oriented to person, place, and time.   No gross motor or sensory deficits   Skin: Skin is warm. She is not diaphoretic.   Psychiatric: She has a normal mood and affect.   Nursing note and vitals reviewed.      Results for orders placed or  performed during the hospital encounter of 11/08/18   POC Glucose Once   Result Value Ref Range    Glucose 90 70 - 130 mg/dL   Tissue Pathology Exam   Result Value Ref Range    Case Report       Surgical Pathology Report                         Case: [id number]                                  Authorizing Provider:  Yunior Acosta MD         Collected:           11/08/2018 07:52 AM          Ordering Location:     [facility]    Received:            11/08/2018 09:07 AM                                 [facility]                                                                    Pathologist:           Poncho Foss MD                                                     Specimen:    Gastric, Antrum, antrum and greater curvature biopsy                                       Final Diagnosis       See Scanned Report             Assessment/Plan   Valerie was seen today for hypertension, hyperlipidemia, diabetes, chronic kidney disease, difficulty swallowing, memory loss and hypothyroidism.    Diagnoses and all orders for this visit:    Benign essential hypertension  -     Ambulatory Referral to Home Health    Mixed hyperlipidemia  -     CBC & Differential  -     Comprehensive Metabolic Panel  -     Lipid Panel    Controlled type 2 diabetes mellitus without complication, without long-term current use of insulin (CMS/[facility])  -     Hemoglobin A1c    Chronic kidney disease, stage II (mild)    Hyperkalemia    Late onset Alzheimer's disease without behavioral disturbance  -     Ambulatory Referral to Home Health    Pharyngoesophageal dysphagia  -     Ambulatory Referral to Gastroenterology  -     XR Chest PA & Lateral    Adult onset hypothyroidism  -     TSH    Need for vaccination  -     pneumococcal conj. 13-valent (PREVNAR-13) vaccine 0.5 mL; Inject 0.5 mL into the appropriate muscle as directed by prescriber 1 (One) Time.        Plan:  1.  Benign essential hypertension: Will continue current medication,  low-sodium diet advised , order hhc and home pt  2.mixed hyperlipidemia:  reviewed  fasting CMP and lipid panel.  Diet and exercise counseled,  Will continue current medications  3. Diabetes mellitus    :   reviewed  fasting CMP  and hba1c  5.7  , diet and exercise counseled , stop amaryl  4. hypothyroidism:  reviewed  tsh , and continue levothyroxine  5.dysphagia : refer to gi  And will get chest xray  6. alzheimers dementia : will get hhc and home pt , per [facility] neurology , Will continue current medications  7. Cri stage 2 : oral  Hydration , creatinine 1.4 , monitor bmp  8.hyperkalemia : potassium 5.6 , stop potassium supplement , low potassium diet  9. Need for prevnar : given today          Katarzyna Griggs MD

## 2019-01-28 ENCOUNTER — TELEPHONE (OUTPATIENT)
Dept: GASTROENTEROLOGY | Facility: CLINIC | Age: 84
End: 2019-01-28

## 2019-01-28 DIAGNOSIS — R13.19 OTHER DYSPHAGIA: Primary | ICD-10-CM

## 2019-01-28 RX ORDER — PANTOPRAZOLE SODIUM 40 MG/1
TABLET, DELAYED RELEASE ORAL
Qty: 30 TABLET | Refills: 5 | Status: SHIPPED | OUTPATIENT
Start: 2019-01-28 | End: 2019-01-31 | Stop reason: SDUPTHER

## 2019-01-28 NOTE — TELEPHONE ENCOUNTER
Did PA on [employer]eds, for Pantoprazole 40 mg, as prompted by [facility] pharmacy.  Approved.  Called [facility] to let them know.  [facility] informed. (Key [id number])

## 2019-01-29 ENCOUNTER — TELEPHONE (OUTPATIENT)
Dept: INTERNAL MEDICINE | Facility: CLINIC | Age: 84
End: 2019-01-29

## 2019-01-29 NOTE — TELEPHONE ENCOUNTER
Elzbieta called and stated that she had been unable to get in touch with patient but finally made contact with patient's daughter Jaki this morning and they will be getting her set up sometime this week while daughter is in town.    Elzbieta just wanted to let Dr. Griggs know.    Callback #: [phone]

## 2019-01-31 DIAGNOSIS — K21.00 GASTROESOPHAGEAL REFLUX DISEASE WITH ESOPHAGITIS: Primary | ICD-10-CM

## 2019-01-31 RX ORDER — PANTOPRAZOLE SODIUM 40 MG/1
TABLET, DELAYED RELEASE ORAL
Qty: 30 TABLET | Refills: 3 | Status: SHIPPED | OUTPATIENT
Start: 2019-01-31 | End: 2019-12-14 | Stop reason: SDUPTHER

## 2019-01-31 NOTE — TELEPHONE ENCOUNTER
Spoke to daughter.  Explained that it was recommended that patient take pantoprazole at her Dec office visit.  Insurance has approved the medication.  Will send the refills to [facility].

## 2019-01-31 NOTE — TELEPHONE ENCOUNTER
Daughter called and left .  She does not think Pantoprazole has been approved, however I PA'd it this week.  Daughter wants to know if they need to continue the Pantoprazole.

## 2019-02-04 ENCOUNTER — TELEPHONE (OUTPATIENT)
Dept: INTERNAL MEDICINE | Facility: CLINIC | Age: 84
End: 2019-02-04

## 2019-02-04 DIAGNOSIS — E87.5 HYPERKALEMIA: Primary | ICD-10-CM

## 2019-02-06 ENCOUNTER — LAB REQUISITION (OUTPATIENT)
Dept: LAB | Facility: HOSPITAL | Age: 84
End: 2019-02-06

## 2019-02-06 DIAGNOSIS — N18.2 CHRONIC KIDNEY DISEASE, STAGE II (MILD): ICD-10-CM

## 2019-02-06 LAB
ANION GAP SERPL CALCULATED.3IONS-SCNC: 11 MMOL/L (ref 10–20)
BUN BLD-MCNC: 23 MG/DL (ref 7–20)
BUN/CREAT SERPL: 16.4 (ref 7.1–23.5)
CALCIUM SPEC-SCNC: 8.9 MG/DL (ref 8.4–10.2)
CHLORIDE SERPL-SCNC: 100 MMOL/L (ref 98–107)
CO2 SERPL-SCNC: 24 MMOL/L (ref 26–30)
CREAT BLD-MCNC: 1.4 MG/DL (ref 0.6–1.3)
GFR SERPL CREATININE-BSD FRML MDRD: 44 ML/MIN/1.73
GLUCOSE BLD-MCNC: 175 MG/DL (ref 74–98)
POTASSIUM BLD-SCNC: 5 MMOL/L (ref 3.5–5.1)
SODIUM BLD-SCNC: 130 MMOL/L (ref 137–145)

## 2019-02-06 PROCEDURE — 80048 BASIC METABOLIC PNL TOTAL CA: CPT | Performed by: INTERNAL MEDICINE

## 2019-02-11 ENCOUNTER — OFFICE VISIT (OUTPATIENT)
Dept: INTERNAL MEDICINE | Facility: CLINIC | Age: 84
End: 2019-02-11

## 2019-02-11 VITALS
OXYGEN SATURATION: 98 % | HEIGHT: 65 IN | TEMPERATURE: 97.5 F | RESPIRATION RATE: 16 BRPM | WEIGHT: 212 LBS | BODY MASS INDEX: 35.32 KG/M2 | HEART RATE: 67 BPM | DIASTOLIC BLOOD PRESSURE: 84 MMHG | SYSTOLIC BLOOD PRESSURE: 126 MMHG

## 2019-02-11 DIAGNOSIS — R13.14 PHARYNGOESOPHAGEAL DYSPHAGIA: ICD-10-CM

## 2019-02-11 DIAGNOSIS — I10 BENIGN ESSENTIAL HYPERTENSION: Primary | ICD-10-CM

## 2019-02-11 DIAGNOSIS — N18.2 CHRONIC KIDNEY DISEASE, STAGE II (MILD): ICD-10-CM

## 2019-02-11 PROCEDURE — 99214 OFFICE O/P EST MOD 30 MIN: CPT | Performed by: INTERNAL MEDICINE

## 2019-02-11 NOTE — PROGRESS NOTES
Subjective   Valerie Davila is a 83 y.o. female.     Chief Complaint   Patient presents with   • Hypertension   • Chronic Kidney Disease   • Difficulty Swallowing       History of Present Illness   HPI: Patient is here to follow up on the blood pressure  The patient is taking the blood pressure medications as prescribed and has had no side effects. The patient is also here to follow up on the cri  And had  lab work done .  The patient also complains of dysphagia and sees gi , she had egd  , she has [facility]  Hyperlipidemia   Pertinent negatives include no chest pain or shortness of breath.   Hypertension   Pertinent negatives include no chest pain, palpitations or shortness of breath.      The following portions of the patient's history were reviewed and updated as appropriate: allergies, current medications, past family history, past medical history, past social history, past surgical history and problem list.    Review of Systems   Constitutional: Negative for appetite change, fatigue and fever.   HENT: Negative for congestion, ear discharge, ear pain, sinus pressure and sore throat.    Eyes: Negative for pain and discharge.   Respiratory: Negative for cough, chest tightness, shortness of breath and wheezing.    Cardiovascular: Negative for chest pain, palpitations and leg swelling.   Gastrointestinal: Negative for abdominal pain, blood in stool, constipation, diarrhea and nausea.        Dysphagia    Endocrine: Negative for cold intolerance and heat intolerance.   Genitourinary: Negative for dysuria, flank pain and frequency.   Musculoskeletal: Negative for back pain and joint swelling.   Skin: Negative for color change.   Allergic/Immunologic: Negative for environmental allergies and food allergies.   Neurological: Negative for dizziness, weakness, numbness and headaches.   Hematological: Negative for adenopathy. Does not bruise/bleed easily.   Psychiatric/Behavioral: Negative for behavioral problems and  dysphoric mood. The patient is not nervous/anxious.          Current Outpatient Medications:   •  albuterol (PROVENTIL HFA;VENTOLIN HFA) 108 (90 Base) MCG/ACT inhaler, Inhale 2 puffs Every 4 (Four) Hours As Needed for Wheezing., Disp: 1 inhaler, Rfl: 0  •  carvedilol (COREG) 25 MG tablet, Take 25 mg by mouth 2 (Two) Times a Day With Meals., Disp: , Rfl:   •  escitalopram (LEXAPRO) 20 MG tablet, Take 1 tablet by mouth Daily., Disp: 90 tablet, Rfl: 3  •  hydrALAZINE (APRESOLINE) 50 MG tablet, Take 1 tablet by mouth Daily., Disp: 90 tablet, Rfl: 3  •  lamoTRIgine (LaMICtal) 25 MG tablet, Take 25 mg by mouth Daily., Disp: , Rfl:   •  levothyroxine (SYNTHROID, LEVOTHROID) 88 MCG tablet, Take 1 tablet by mouth Daily., Disp: 90 tablet, Rfl: 3  •  memantine (NAMENDA TITRATION PACK) 5 (28)-10 (21) MG tablet pack, Follow package directions. (Patient taking differently: Take 10 mg by mouth 2 (Two) Times a Day. Follow package directions.), Disp: , Rfl: 12  •  olmesartan (BENICAR) 40 MG tablet, Take 1 tablet by mouth Daily., Disp: 90 tablet, Rfl: 3  •  pantoprazole (PROTONIX) 40 MG EC tablet, Take 1 tablet 30 minutes before breakfast daily., Disp: 30 tablet, Rfl: 3  •  pravastatin (PRAVACHOL) 40 MG tablet, Take 1 tablet by mouth Daily., Disp: 90 tablet, Rfl: 3  •  QUEtiapine (SEROquel) 25 MG tablet, Take 25 mg by mouth Every Night., Disp: , Rfl:   •  spironolactone-hydrochlorothiazide (ALDACTAZIDE) 25-25 MG tablet, Take 1 tablet by mouth Daily., Disp: , Rfl:   •  timolol (BETIMOL) 0.5 % ophthalmic solution, Administer 1 drop to both eyes Daily., Disp: , Rfl:   •  travoprost, BAK free, (TRAVATAN Z) 0.004 % solution ophthalmic solution, Administer 1 drop to both eyes Daily., Disp: , Rfl:   •  traZODone (DESYREL) 100 MG tablet, Take 1 tablet by mouth Every Night., Disp: 90 tablet, Rfl: 3    Current Facility-Administered Medications:   •  cyanocobalamin injection 1,000 mcg, 1,000 mcg, Intramuscular, Q28 Days, Katarzyna Griggs MD,  "1,000 mcg at 10/03/16 1518    Objective     Blood pressure 126/84, pulse 67, temperature 97.5 °F (36.4 °C), temperature source Temporal, resp. rate 16, height 165.1 cm (65\"), weight 96.2 kg (212 lb), SpO2 98 %, not currently breastfeeding.    Physical Exam   Constitutional: She appears well-developed and well-nourished. No distress.   HENT:   Head: Normocephalic and atraumatic.   Right Ear: External ear normal.   Left Ear: External ear normal.   Nose: Nose normal.   Mouth/Throat: Oropharynx is clear and moist.   Eyes: Conjunctivae and EOM are normal. Pupils are equal, round, and reactive to light.   Neck: Neck supple. No thyromegaly present.   Cardiovascular: Normal rate, regular rhythm and normal heart sounds.   Pulmonary/Chest: Effort normal and breath sounds normal. No respiratory distress.   Abdominal: Soft. Bowel sounds are normal. She exhibits no distension. There is no tenderness. There is no rebound.   Musculoskeletal: Normal range of motion. She exhibits no edema.   Lymphadenopathy:     She has no cervical adenopathy.   Neurological: She is alert.   No gross motor or sensory deficits   Skin: Skin is warm. She is not diaphoretic.   Psychiatric: She has a normal mood and affect.   Nursing note and vitals reviewed.    Patient's Body mass index is 35.28 kg/m². BMI is above normal parameters. Recommendations include: educational material, exercise counseling and referral to primary care.      Results for orders placed or performed in visit on 02/06/19   Basic Metabolic Panel   Result Value Ref Range    Glucose 175 (H) 74 - 98 mg/dL    BUN 23 (H) 7 - 20 mg/dL    Creatinine 1.40 (H) 0.60 - 1.30 mg/dL    Sodium 130 (L) 137 - 145 mmol/L    Potassium 5.0 3.5 - 5.1 mmol/L    Chloride 100 98 - 107 mmol/L    CO2 24.0 (L) 26.0 - 30.0 mmol/L    Calcium 8.9 8.4 - 10.2 mg/dL    eGFR  African Amer 44 (L) >60 mL/min/1.73    BUN/Creatinine Ratio 16.4 7.1 - 23.5    Anion Gap 11.0 10.0 - 20.0 mmol/L         Assessment/Plan "   Valerie was seen today for hypertension, chronic kidney disease and difficulty swallowing.    Diagnoses and all orders for this visit:    Benign essential hypertension    Chronic kidney disease, stage II (mild)    Pharyngoesophageal dysphagia  -     Ambulatory Referral to Home Health    Plan:  1.  Benign essential hypertension: Will continue current medication, low-sodium diet advised  2. Cri stage 2  : oral hydration , labs reviewed , creatinine 1.4  3. dysphagia : refer to [facility]  For speech therapy , keep gi appointment           Katarzyna Griggs MD

## 2019-02-15 ENCOUNTER — TELEPHONE (OUTPATIENT)
Dept: INTERNAL MEDICINE | Facility: CLINIC | Age: 84
End: 2019-02-15

## 2019-02-15 DIAGNOSIS — F02.818 LATE ONSET ALZHEIMER'S DISEASE WITH BEHAVIORAL DISTURBANCE (HCC): Primary | ICD-10-CM

## 2019-02-15 DIAGNOSIS — G30.1 LATE ONSET ALZHEIMER'S DISEASE WITH BEHAVIORAL DISTURBANCE (HCC): Primary | ICD-10-CM

## 2019-02-15 NOTE — TELEPHONE ENCOUNTER
Aniya Villa with [facility] called requesting an order for a bedside commode for this patient order can be faxed to   [phone]

## 2019-02-27 ENCOUNTER — OFFICE VISIT (OUTPATIENT)
Dept: GASTROENTEROLOGY | Facility: CLINIC | Age: 84
End: 2019-02-27

## 2019-02-27 VITALS
HEIGHT: 65 IN | SYSTOLIC BLOOD PRESSURE: 138 MMHG | TEMPERATURE: 97.8 F | BODY MASS INDEX: 34.66 KG/M2 | DIASTOLIC BLOOD PRESSURE: 91 MMHG | HEART RATE: 65 BPM | WEIGHT: 208 LBS | RESPIRATION RATE: 12 BRPM

## 2019-02-27 DIAGNOSIS — R13.19 OTHER DYSPHAGIA: ICD-10-CM

## 2019-02-27 DIAGNOSIS — R12 HEARTBURN: ICD-10-CM

## 2019-02-27 DIAGNOSIS — T17.308A CHOKING, INITIAL ENCOUNTER: Primary | ICD-10-CM

## 2019-02-27 PROCEDURE — 99214 OFFICE O/P EST MOD 30 MIN: CPT | Performed by: INTERNAL MEDICINE

## 2019-02-27 NOTE — PROGRESS NOTES
Chief Complaint   Patient presents with   • Choking and coughing spells     History of Present Illness     The patient had a history of difficulty swallowing since August 2018.  Dysphagic symptoms were moderately severe and it used to occur 2-3 times a week.  The symptoms were associated mostly with solid food.  The patient had undergone an upper endoscopy in November 2018 which revealed mild distal esophagitis and nonobstructive Schatzki's type ring.  The patient was also found to have subtle concentric rings involving the mid and distal esophagus.  She underwent serial dilation of the mid and distal esophagus to 18 mm.  Since then she feels much better.  There is no further dysphagia.  However lately her family has noted that she at times has coughing spells.  It appears that the patient may keep the food in the mouth which may lead to coughing spells.  There is no otherwise documented difficulty swallowing.  There is no history suggestive of regurgitation.    There is no diarrhea or constipation.  Her reflux symptoms are under control.  There is no hematemesis, melena or hematochezia.  She denies abdominal pain.     Review of Systems   Constitutional: Negative for appetite change, chills, fatigue, fever and unexpected weight change.   HENT: Negative for mouth sores, nosebleeds and trouble swallowing.    Eyes: Negative for discharge and redness.   Respiratory: Positive for cough and choking. Negative for apnea and shortness of breath.    Cardiovascular: Negative for chest pain, palpitations and leg swelling.   Gastrointestinal: Negative for abdominal distention, abdominal pain, anal bleeding, blood in stool, constipation, diarrhea, nausea and vomiting.   Endocrine: Negative for cold intolerance, heat intolerance and polydipsia.   Genitourinary: Negative for dysuria, hematuria and urgency.   Musculoskeletal: Negative for arthralgias, joint swelling and myalgias.   Skin: Negative for rash.   Allergic/Immunologic:  "Negative for food allergies and immunocompromised state.   Neurological: Negative for dizziness, seizures, syncope and headaches.   Hematological: Negative for adenopathy. Does not bruise/bleed easily.   Psychiatric/Behavioral: Negative for dysphoric mood. The patient is not nervous/anxious and is not hyperactive.      Patient Active Problem List   Diagnosis   • Abnormal weight loss   • Acute bronchitis   • Alzheimer's dementia with behavioral disturbance   • Benign essential hypertension   • Dementia   • Type 2 diabetes mellitus (CMS/HCC)   • Dizziness   • Edema   • Gastroesophageal reflux disease with esophagitis   • Syncope   • Fatigue   • Hypothyroidism   • Memory loss   • Breast pain   • Proteinuria   • Multiple-type hyperlipidemia   • Subacute delirium   • Uncontrolled type 2 diabetes mellitus (CMS/[facility])   • Cobalamin deficiency   • Vitamin D deficiency   • Diverticulosis of colon   • Influenza vaccine needed   • Dysphagia     Past Medical History:   Diagnosis Date   • Abnormal weight loss    • Acute bronchitis    • Allergic rhinitis    • Anxiety    • Arthritis    • Benign neoplasm of large bowel     Colon polyp   • Body piercing     EARS   • Cellulitis    • Confusion     DAUGHTER REPORTS \"SHE'S A SUN DOWNER\"    • Diabetes (CMS/[facility]) 2018   • Diarrhea    • Diverticulitis    • Diverticulitis of colon    • Dizziness    • Dysphagia     DAUGHTER REPORTS SHE HAS NOTICED THAT PATIENT GETS CHOKED EASILY    • Early onset Alzheimer's disease with behavioral disturbance    • Edema     DAUGHTER REPORTS OF BLE'S   • Elevated cholesterol    • Enthesopathy of knee    • Esophageal reflux    • Fainting    • Full dentures     DAUGHTER INSTRUCTED NO ADHESIVES THE DOS   • Glaucoma    • Hearing loss     NO USE OF HEARING AIDS   • Hyperlipidemia    • Hypertension    • Hypothyroidism    • Knee pain    • Loss of memory    • Mastodynia    • Osteoarthritis of knee    • Polyp of colon    • Proteinuria    • Seasonal allergies    • " Subacute delirium    • Vertigo    • Vitamin D deficiency    • Wears glasses      Past Surgical History:   Procedure Laterality Date   • CATARACT EXTRACTION Bilateral    • COLONOSCOPY     • ENDOSCOPY N/A 2018    Procedure: ESOPHAGOGASTRODUODENOSCOPY WITH BIOPSY AND DILIATATION;  Surgeon: Yunior Acosta MD;  Location: [facility] ENDOSCOPY;  Service: Gastroenterology   • HYSTERECTOMY      and bilateral salpingo-oophorectomy   • JOINT REPLACEMENT Right 2013    KNEE REPLACEMENT   • MOUTH SURGERY      FULL MOUTH EXTRACTION     Family History   Problem Relation Age of Onset   • Pneumonia Mother         CAUSED HER DEATH IN HER 70'S   • Heart attack Father    • Stroke Other    • Colon cancer Neg Hx    • Cirrhosis Neg Hx    • Liver disease Neg Hx    • Liver cancer Neg Hx    • Stomach cancer Neg Hx    • Esophageal cancer Neg Hx    • Crohn's disease Neg Hx    • Ulcerative colitis Neg Hx      Social History     Tobacco Use   • Smoking status: Former Smoker     Packs/day: 0.50     Years: 25.00     Pack years: 12.50     Types: Cigarettes     Last attempt to quit:      Years since quittin.1   • Smokeless tobacco: Never Used   Substance Use Topics   • Alcohol use: No       Current Outpatient Medications:   •  albuterol (PROVENTIL HFA;VENTOLIN HFA) 108 (90 Base) MCG/ACT inhaler, Inhale 2 puffs Every 4 (Four) Hours As Needed for Wheezing., Disp: 1 inhaler, Rfl: 0  •  carvedilol (COREG) 25 MG tablet, Take 25 mg by mouth 2 (Two) Times a Day With Meals., Disp: , Rfl:   •  escitalopram (LEXAPRO) 20 MG tablet, Take 1 tablet by mouth Daily., Disp: 90 tablet, Rfl: 3  •  hydrALAZINE (APRESOLINE) 50 MG tablet, Take 1 tablet by mouth Daily., Disp: 90 tablet, Rfl: 3  •  lamoTRIgine (LaMICtal) 25 MG tablet, Take 25 mg by mouth Daily., Disp: , Rfl:   •  levothyroxine (SYNTHROID, LEVOTHROID) 88 MCG tablet, Take 1 tablet by mouth Daily., Disp: 90 tablet, Rfl: 3  •  Misc. Devices (COMMODE BEDSIDE) misc, Use as directed, Disp: 1  "each, Rfl: 0  •  olmesartan (BENICAR) 40 MG tablet, Take 1 tablet by mouth Daily., Disp: 90 tablet, Rfl: 3  •  pantoprazole (PROTONIX) 40 MG EC tablet, Take 1 tablet 30 minutes before breakfast daily., Disp: 30 tablet, Rfl: 3  •  pravastatin (PRAVACHOL) 40 MG tablet, Take 1 tablet by mouth Daily., Disp: 90 tablet, Rfl: 3  •  QUEtiapine (SEROquel) 25 MG tablet, Take 25 mg by mouth Every Night., Disp: , Rfl:   •  spironolactone-hydrochlorothiazide (ALDACTAZIDE) 25-25 MG tablet, Take 1 tablet by mouth Daily., Disp: , Rfl:   •  timolol (BETIMOL) 0.5 % ophthalmic solution, Administer 1 drop to both eyes Daily., Disp: , Rfl:   •  travoprost, TA free, (TRAVATAN Z) 0.004 % solution ophthalmic solution, Administer 1 drop to both eyes Daily., Disp: , Rfl:   •  traZODone (DESYREL) 100 MG tablet, Take 1 tablet by mouth Every Night., Disp: 90 tablet, Rfl: 3  No current facility-administered medications for this visit.     Allergies   Allergen Reactions   • Aricept [Donepezil Hcl] Confusion   • Aspirin Other (See Comments)     DAUGHTER REPORTS HER MOTHER HAS ALWAYS LISTED AS AN ALLERGY BUT THAT SHE DOES NOT KNOW REACTION TYPE     • Lactose Intolerance (Gi) GI Intolerance   • Metformin Diarrhea   • Penicillins Unknown (See Comments)     Daughter reports patient has been allergic to this since she was a child and has not had it since so she is unsure what it done to her.       Blood pressure 138/91, pulse 65, temperature 97.8 °F (36.6 °C), resp. rate 12, height 165.1 cm (65\"), weight 94.3 kg (208 lb), not currently breastfeeding.    Physical Exam   Constitutional: She is oriented to person, place, and time. She appears well-developed and well-nourished. No distress.   HENT:   Head: Normocephalic and atraumatic.   Right Ear: Hearing and external ear normal.   Left Ear: Hearing and external ear normal.   Nose: Nose normal.   Mouth/Throat: Oropharynx is clear and moist and mucous membranes are normal. Mucous membranes are not pale, " not dry and not cyanotic. No oral lesions. No oropharyngeal exudate.   Eyes: Conjunctivae and EOM are normal. Right eye exhibits no discharge. Left eye exhibits no discharge. No scleral icterus.   Neck: Trachea normal. Neck supple. No JVD present. No edema present. No thyroid mass and no thyromegaly present.   Cardiovascular: Normal rate, regular rhythm, S2 normal and normal heart sounds. Exam reveals no gallop, no S3 and no friction rub.   No murmur heard.  Pulmonary/Chest: Effort normal and breath sounds normal. No respiratory distress. She has no wheezes. She has no rales. She exhibits no tenderness.   Abdominal: Soft. Normal appearance and bowel sounds are normal. She exhibits no distension, no ascites and no mass. There is no splenomegaly or hepatomegaly. There is no tenderness. There is no rigidity, no rebound and no guarding. No hernia.   Musculoskeletal: She exhibits no tenderness or deformity.     Vascular Status -  Her right foot exhibits no edema. Her left foot exhibits no edema.  Lymphadenopathy:     She has no cervical adenopathy.        Left: No supraclavicular adenopathy present.   Neurological: She is alert and oriented to person, place, and time. She has normal strength. No cranial nerve deficit or sensory deficit. She exhibits normal muscle tone. Coordination normal.   Skin: No rash noted. She is not diaphoretic. No cyanosis. No pallor. Nails show no clubbing.   Psychiatric: She has a normal mood and affect. Her behavior is normal.   Nursing note and vitals reviewed.  Stigmata of chronic liver disease:  None.  Asterixis:  None.    Laboratory Testing:  Upon review of medical records:    Dated September 18, 2017 sodium 138 potassium 4.3 chloride 102 CO2 27 BUN 20 serum creatinine 0.90 glucose 213.  Calcium 9.4.  Total protein 7.7.  Albumin 4.30.  T bili 1.3 AST 39 ALT 50 alkaline phosphatase 64.  Total cholesterol 228.  Triglycerides 69.  TSH 3.330.  Hemoglobin A1c 7.20.  Vitamin B12 level 581.  WBC  7.16 hemoglobin 15.4 hematocrit 47.9 MCV 95.0 platelet count 142.     Dated August 24, 2018 sodium 137 potassium 5.3 chloride 104 CO2 28 BUN 28 serum creatinine 1.40 glucose 114.  Calcium 9.1.  Total protein 6.7.  Albumin 3.70.  T bili 0.8 AST 36 ALT 26 alkaline phosphatase 54.  Total cholesterol 201.  Triglycerides 70.  TSH 4.080.  Hemoglobin A1c 5.70.  WBC 7.03 hemoglobin 13.0 hematocrit 41.5 .5 and platelet count 130.    Dated January 17, 2019 sodium 136 potassium 5.6 chloride 103 CO2 27 BUN 20 serum creatinine 1.40 glucose 80.  Calcium 9.3.  Total protein 7.0.  Albumin 3.90.  T bili 0.8 AST 27 ALT 22 alkaline phosphatase 56.  Total cholesterol 203.  Triglycerides 66.  TSH 1.410.  WBC 7.68 hemoglobin 12.8 hematocrit 40.3 MCV 98.8 and platelet count 178.     Procedures:  Upon review of medical records:     Dated January 16, 2014 the patient underwent a colonoscopy to the terminal ileum which revealed: Pandiverticulosis, erosion within the left colon at what appears to be an anastomotic site, colon polyp, and internal hemorrhoids.  Sigmoid colon anastomosis, biopsy revealed minimal nonspecific chronic inflammation and reactive glandular changes.  Negative for malignancy.  Cecal polyp, biopsy revealed inflammatory polyp.     Dated November 8, 2018 the patient underwent an upper endoscopy which revealed: Erythematous distal esophagitis.  Subtle concentric rings involving the mid and distal esophagus.  Status post serial dilation to 18 mm.  Nonobstructive Schatzki’s-type ring.  Small sliding hiatal hernia.  Erythematous-erosive gastritis. Erosive duodenitis.  Gastric deformity.  Antrum and greater curvature, biopsy revealed compatible with reactive gastropathy.    Assessment:      ICD-10-CM ICD-9-CM   1. Choking, initial encounter T17.308A 933.1   2. Heartburn R12 787.1   3. Other dysphagia R13.19 787.29         Discussion:  1. There is no further dysphagia.  The patient in reality forgets to swallow the last  part of the food which at times kept in the mouth leading to coughing and choking.  2. The patient was witnessed to 6 ounces of water without difficulty.  She did not have any cough or choking.    Plan/  Patient Instructions   1. The patient should eat relatively soft diet.  The patient is trying to observe low lactose diet as well. The patient may drink liberal amounts of water.  2. The patient should eat in upright position and chew well.  The patient should drink water after to 3 bites and take medications with liberal amounts of water.    3. Generally,  medications that have a potential to cause pill esophagitis may be avoided or used in an alternative form. For example Motrin or Aleve can be taken in a gel cap form.  4. After eating and taking medications the patient should remain in upright position for 10-15 minutes.      5. Protonix (Pantoprazole) tablet 40 mg tablet. Take 1 tablet orally in the morning half an hour before eating every day.     6. After eating the patient should be washed and made sure that she is not keeping some residual food in her mouth.    7. Further evaluation with modified barium swallow was also discussed with the patient daughter.  She would like to hold off on further testing at this point.    8. The patient-the family will call back regarding follow-up.           Yunior Acosta MD

## 2019-02-28 NOTE — PATIENT INSTRUCTIONS
1. The patient should eat relatively soft diet.  The patient is trying to observe low lactose diet as well. The patient may drink liberal amounts of water.  2. The patient should eat in upright position and chew well.  The patient should drink water after to 3 bites and take medications with liberal amounts of water.    3. Generally,  medications that have a potential to cause pill esophagitis may be avoided or used in an alternative form. For example Motrin or Aleve can be taken in a gel cap form.  4. After eating and taking medications the patient should remain in upright position for 10-15 minutes.      5. Protonix (Pantoprazole) tablet 40 mg tablet. Take 1 tablet orally in the morning half an hour before eating every day.     6. After eating the patient should be washed and made sure that she is not keeping some residual food in her mouth.    7. Further evaluation with modified barium swallow was also discussed with the patient daughter.  She would like to hold off on further testing at this point.    8. The patient-the family will call back regarding follow-up.

## 2019-03-22 ENCOUNTER — TELEPHONE (OUTPATIENT)
Dept: INTERNAL MEDICINE | Facility: CLINIC | Age: 84
End: 2019-03-22

## 2019-03-22 NOTE — TELEPHONE ENCOUNTER
Daughter called about a place on her buttocks, thinks she is about to get a bed sore. They need to know what you advise.  Please call at [phone]

## 2019-03-26 ENCOUNTER — OFFICE VISIT (OUTPATIENT)
Dept: INTERNAL MEDICINE | Facility: CLINIC | Age: 84
End: 2019-03-26

## 2019-03-26 VITALS
WEIGHT: 208 LBS | HEART RATE: 64 BPM | RESPIRATION RATE: 15 BRPM | BODY MASS INDEX: 34.66 KG/M2 | TEMPERATURE: 96.3 F | HEIGHT: 65 IN | DIASTOLIC BLOOD PRESSURE: 81 MMHG | SYSTOLIC BLOOD PRESSURE: 144 MMHG | OXYGEN SATURATION: 98 %

## 2019-03-26 DIAGNOSIS — I10 BENIGN ESSENTIAL HYPERTENSION: Primary | ICD-10-CM

## 2019-03-26 DIAGNOSIS — L89.91 HEALING PRESSURE INJURY, STAGE 1: ICD-10-CM

## 2019-03-26 DIAGNOSIS — E11.9 CONTROLLED TYPE 2 DIABETES MELLITUS WITHOUT COMPLICATION, WITHOUT LONG-TERM CURRENT USE OF INSULIN (HCC): ICD-10-CM

## 2019-03-26 PROCEDURE — 99214 OFFICE O/P EST MOD 30 MIN: CPT | Performed by: INTERNAL MEDICINE

## 2019-03-26 NOTE — PROGRESS NOTES
Subjective   Valerie Davila is a 83 y.o. female.     Chief Complaint   Patient presents with   • Wound Infection   • Hypertension   • Diabetes       History of Present Illness   HPI: Patient is here to follow up on the blood pressure  The patient is taking the blood pressure medications as prescribed and has had no side effects.   The patient is also here to follow on sugar and  due to get lab work done . The patient is brought in by her daughter today on her buttock area.  Her daughter states that the patient sits in her recliner throughout the day.  She does not ambulate. they have been dressing the wound and the lesion is improving.  I have informed the daughter that patient needs to be turned over ambulating so that she does not have pressure ulcers  Diabetes   Pertinent negatives for hypoglycemia include no dizziness, nervousness/anxiousness or pallor. Pertinent negatives for diabetes include no chest pain, no shortness of breath  Patient is on acei/arb     The following portions of the patient's history were reviewed and updated as appropriate: allergies, current medications, past family history, past medical history, past social history, past surgical history and problem list.    Review of Systems   Constitutional: Negative for appetite change, fatigue and fever.   HENT: Negative for congestion, ear discharge, ear pain, sinus pressure and sore throat.    Eyes: Negative for pain and discharge.   Respiratory: Negative for cough, chest tightness, shortness of breath and wheezing.    Cardiovascular: Negative for chest pain, palpitations and leg swelling.   Gastrointestinal: Negative for abdominal pain, blood in stool, constipation, diarrhea and nausea.   Endocrine: Negative for cold intolerance and heat intolerance.   Genitourinary: Negative for dysuria, flank pain and frequency.   Musculoskeletal: Negative for back pain and joint swelling.   Skin: Positive for color change and wound.   Allergic/Immunologic:  Negative for environmental allergies and food allergies.   Neurological: Negative for dizziness, weakness, numbness and headaches.   Hematological: Negative for adenopathy. Does not bruise/bleed easily.   Psychiatric/Behavioral: Negative for behavioral problems and dysphoric mood. The patient is not nervous/anxious.          Current Outpatient Medications:   •  albuterol (PROVENTIL HFA;VENTOLIN HFA) 108 (90 Base) MCG/ACT inhaler, Inhale 2 puffs Every 4 (Four) Hours As Needed for Wheezing., Disp: 1 inhaler, Rfl: 0  •  carvedilol (COREG) 25 MG tablet, Take 25 mg by mouth 2 (Two) Times a Day With Meals., Disp: , Rfl:   •  escitalopram (LEXAPRO) 20 MG tablet, Take 1 tablet by mouth Daily., Disp: 90 tablet, Rfl: 3  •  hydrALAZINE (APRESOLINE) 50 MG tablet, Take 1 tablet by mouth Daily., Disp: 90 tablet, Rfl: 3  •  lamoTRIgine (LaMICtal) 25 MG tablet, Take 25 mg by mouth Daily., Disp: , Rfl:   •  levothyroxine (SYNTHROID, LEVOTHROID) 88 MCG tablet, Take 1 tablet by mouth Daily., Disp: 90 tablet, Rfl: 3  •  Misc. Devices ([place] BEDSIDE) misc, Use as directed, Disp: 1 each, Rfl: 0  •  olmesartan (BENICAR) 40 MG tablet, Take 1 tablet by mouth Daily., Disp: 90 tablet, Rfl: 3  •  pantoprazole (PROTONIX) 40 MG EC tablet, Take 1 tablet 30 minutes before breakfast daily., Disp: 30 tablet, Rfl: 3  •  pravastatin (PRAVACHOL) 40 MG tablet, Take 1 tablet by mouth Daily., Disp: 90 tablet, Rfl: 3  •  QUEtiapine (SEROquel) 25 MG tablet, Take 25 mg by mouth Every Night., Disp: , Rfl:   •  spironolactone-hydrochlorothiazide (ALDACTAZIDE) 25-25 MG tablet, Take 1 tablet by mouth Daily., Disp: , Rfl:   •  timolol (BETIMOL) 0.5 % ophthalmic solution, Administer 1 drop to both eyes Daily., Disp: , Rfl:   •  travoprost, BAK free, (TRAVATAN Z) 0.004 % solution ophthalmic solution, Administer 1 drop to both eyes Daily., Disp: , Rfl:   •  traZODone (DESYREL) 100 MG tablet, Take 1 tablet by mouth Every Night., Disp: 90 tablet, Rfl: 3    Objective  "    Blood pressure 144/81, pulse 64, temperature 96.3 °F (35.7 °C), resp. rate 15, height 165.1 cm (65\"), weight 94.3 kg (208 lb), SpO2 98 %, not currently breastfeeding.    Physical Exam   Constitutional: She appears well-developed and well-nourished. No distress.   HENT:   Head: Normocephalic and atraumatic.   Right Ear: External ear normal.   Left Ear: External ear normal.   Nose: Nose normal.   Mouth/Throat: Oropharynx is clear and moist.   Eyes: Conjunctivae and EOM are normal. Pupils are equal, round, and reactive to light.   Neck: Neck supple. No thyromegaly present.   Cardiovascular: Normal rate, regular rhythm and normal heart sounds.   Pulmonary/Chest: Effort normal and breath sounds normal. No respiratory distress.   Abdominal: Soft. Bowel sounds are normal. She exhibits no distension. There is no tenderness. There is no rebound.   Musculoskeletal: She exhibits no edema.   In wheelchair   Lymphadenopathy:     She has no cervical adenopathy.   Neurological: She is alert.   No gross motor or sensory deficits   Skin: Skin is warm. She is not diaphoretic. There is erythema.   Left side of upper mid buttock small resolving stage I pressure ulcer   Psychiatric: She has a normal mood and affect.   Nursing note and vitals reviewed.    Patient's Body mass index is 34.61 kg/m². BMI is above normal parameters. Recommendations include: educational material, exercise counseling and nutrition counseling.      Results for orders placed or performed in visit on 02/06/19   Basic Metabolic Panel   Result Value Ref Range    Glucose 175 (H) 74 - 98 mg/dL    BUN 23 (H) 7 - 20 mg/dL    Creatinine 1.40 (H) 0.60 - 1.30 mg/dL    Sodium 130 (L) 137 - 145 mmol/L    Potassium 5.0 3.5 - 5.1 mmol/L    Chloride 100 98 - 107 mmol/L    CO2 24.0 (L) 26.0 - 30.0 mmol/L    Calcium 8.9 8.4 - 10.2 mg/dL    eGFR  African Amer 44 (L) >60 mL/min/1.73    BUN/Creatinine Ratio 16.4 7.1 - 23.5    Anion Gap 11.0 10.0 - 20.0 mmol/L "         Assessment/Plan   Valerie was seen today for wound infection, hypertension and diabetes.    Diagnoses and all orders for this visit:    Benign essential hypertension    Controlled type 2 diabetes mellitus without complication, without long-term current use of insulin (CMS/[facility])    Healing pressure injury, stage 1      Plan:  1.  Benign essential hypertension: Will continue current medication, low-sodium diet advised  2. Diabetes  Mellitus  : will obtain   fasting CMP  and hba1c  , diet and exercise counseled , Will continue current medications  3.  Stage I pressure ulcer on left side of the buttock area: Resolving, advised to move the patient around to prevent pressure point injury ,also advised to apply barrier medication for prevention             Katarzyna Griggs MD

## 2019-04-22 LAB
ALBUMIN SERPL-MCNC: 3.8 G/DL (ref 3.5–5)
ALBUMIN/GLOB SERPL: 1.2 G/DL (ref 1–2)
ALP SERPL-CCNC: 74 U/L (ref 38–126)
ALT SERPL-CCNC: 18 U/L (ref 13–69)
AST SERPL-CCNC: 25 U/L (ref 15–46)
BASOPHILS # BLD AUTO: 0.06 10*3/MM3 (ref 0–0.2)
BASOPHILS NFR BLD AUTO: 0.9 % (ref 0–1.5)
BILIRUB SERPL-MCNC: 0.7 MG/DL (ref 0.2–1.3)
BUN SERPL-MCNC: 15 MG/DL (ref 7–20)
BUN/CREAT SERPL: 11.5 (ref 7.1–23.5)
CALCIUM SERPL-MCNC: 9.5 MG/DL (ref 8.4–10.2)
CHLORIDE SERPL-SCNC: 100 MMOL/L (ref 98–107)
CHOLEST SERPL-MCNC: 206 MG/DL (ref 0–199)
CO2 SERPL-SCNC: 30 MMOL/L (ref 26–30)
CREAT SERPL-MCNC: 1.3 MG/DL (ref 0.6–1.3)
EOSINOPHIL # BLD AUTO: 0.18 10*3/MM3 (ref 0–0.4)
EOSINOPHIL NFR BLD AUTO: 2.8 % (ref 0.3–6.2)
ERYTHROCYTE [DISTWIDTH] IN BLOOD BY AUTOMATED COUNT: 12.4 % (ref 12.3–15.4)
GLOBULIN SER CALC-MCNC: 3.2 GM/DL
GLUCOSE SERPL-MCNC: 179 MG/DL (ref 74–98)
HBA1C MFR BLD: 6.2 % (ref 4.8–5.6)
HCT VFR BLD AUTO: 41.6 % (ref 34–46.6)
HDLC SERPL-MCNC: 85 MG/DL (ref 40–60)
HGB BLD-MCNC: 13.2 G/DL (ref 12–15.9)
IMM GRANULOCYTES # BLD AUTO: 0.03 10*3/MM3 (ref 0–0.05)
IMM GRANULOCYTES NFR BLD AUTO: 0.5 % (ref 0–0.5)
LDLC SERPL CALC-MCNC: 108 MG/DL (ref 0–99)
LYMPHOCYTES # BLD AUTO: 1.39 10*3/MM3 (ref 0.7–3.1)
LYMPHOCYTES NFR BLD AUTO: 21.5 % (ref 19.6–45.3)
MCH RBC QN AUTO: 31.3 PG (ref 26.6–33)
MCHC RBC AUTO-ENTMCNC: 31.7 G/DL (ref 31.5–35.7)
MCV RBC AUTO: 98.6 FL (ref 79–97)
MONOCYTES # BLD AUTO: 0.43 10*3/MM3 (ref 0.1–0.9)
MONOCYTES NFR BLD AUTO: 6.6 % (ref 5–12)
NEUTROPHILS # BLD AUTO: 4.38 10*3/MM3 (ref 1.7–7)
NEUTROPHILS NFR BLD AUTO: 67.7 % (ref 42.7–76)
NRBC BLD AUTO-RTO: 0 /100 WBC (ref 0–0.2)
PLATELET # BLD AUTO: 146 10*3/MM3 (ref 140–450)
POTASSIUM SERPL-SCNC: 4.9 MMOL/L (ref 3.5–5.1)
PROT SERPL-MCNC: 7 G/DL (ref 6.3–8.2)
RBC # BLD AUTO: 4.22 10*6/MM3 (ref 3.77–5.28)
SODIUM SERPL-SCNC: 135 MMOL/L (ref 137–145)
TRIGL SERPL-MCNC: 64 MG/DL
TSH SERPL DL<=0.005 MIU/L-ACNC: 1.67 MIU/ML (ref 0.47–4.68)
VLDLC SERPL CALC-MCNC: 12.8 MG/DL
WBC # BLD AUTO: 6.47 10*3/MM3 (ref 3.4–10.8)

## 2019-04-24 ENCOUNTER — OFFICE VISIT (OUTPATIENT)
Dept: INTERNAL MEDICINE | Facility: CLINIC | Age: 84
End: 2019-04-24

## 2019-04-24 VITALS
TEMPERATURE: 97.3 F | RESPIRATION RATE: 16 BRPM | SYSTOLIC BLOOD PRESSURE: 138 MMHG | WEIGHT: 217 LBS | OXYGEN SATURATION: 95 % | BODY MASS INDEX: 36.15 KG/M2 | HEART RATE: 64 BPM | HEIGHT: 65 IN | DIASTOLIC BLOOD PRESSURE: 76 MMHG

## 2019-04-24 DIAGNOSIS — G30.1 LATE ONSET ALZHEIMER'S DISEASE WITH BEHAVIORAL DISTURBANCE (HCC): ICD-10-CM

## 2019-04-24 DIAGNOSIS — E03.8 ADULT ONSET HYPOTHYROIDISM: ICD-10-CM

## 2019-04-24 DIAGNOSIS — E78.2 MIXED HYPERLIPIDEMIA: ICD-10-CM

## 2019-04-24 DIAGNOSIS — F02.818 LATE ONSET ALZHEIMER'S DISEASE WITH BEHAVIORAL DISTURBANCE (HCC): ICD-10-CM

## 2019-04-24 DIAGNOSIS — I10 BENIGN ESSENTIAL HYPERTENSION: Primary | ICD-10-CM

## 2019-04-24 DIAGNOSIS — E11.9 CONTROLLED TYPE 2 DIABETES MELLITUS WITHOUT COMPLICATION, WITHOUT LONG-TERM CURRENT USE OF INSULIN (HCC): ICD-10-CM

## 2019-04-24 DIAGNOSIS — F41.9 ANXIETY: ICD-10-CM

## 2019-04-24 PROCEDURE — 99214 OFFICE O/P EST MOD 30 MIN: CPT | Performed by: INTERNAL MEDICINE

## 2019-04-24 RX ORDER — CARVEDILOL 25 MG/1
25 TABLET ORAL 2 TIMES DAILY WITH MEALS
Qty: 180 TABLET | Refills: 3 | Status: SHIPPED | OUTPATIENT
Start: 2019-04-24 | End: 2020-04-09

## 2019-04-24 RX ORDER — ESCITALOPRAM OXALATE 20 MG/1
20 TABLET ORAL DAILY
Qty: 90 TABLET | Refills: 3 | Status: SHIPPED | OUTPATIENT
Start: 2019-04-24 | End: 2020-04-09

## 2019-04-24 RX ORDER — LEVOTHYROXINE SODIUM 88 UG/1
88 TABLET ORAL DAILY
Qty: 90 TABLET | Refills: 3 | Status: SHIPPED | OUTPATIENT
Start: 2019-04-24 | End: 2020-05-11

## 2019-04-24 NOTE — PROGRESS NOTES
Subjective   Valerie Davila is a 83 y.o. female.     Chief Complaint   Patient presents with   • Hypertension   • Hyperlipidemia   • Diabetes   • Hypothyroidism   • Memory Loss   • Anxiety       History of Present Illness   HPI: Patient is here to follow up on the blood pressure  The patient is taking the blood pressure medications as prescribed and has had no side effects. The patient is also here to follow up on the cholesterol and is trying to follow a diet. The patient is also here to follow on sugar and  due to get lab work done . The patient also complains of anxiety  And is on lexapro ad  needs refills on medications .  She is with her daughter today who states patient is also here to follow up on dementia and was seen by [facility] neurology last month  Hypertension   Pertinent negatives include no chest pain, palpitations or shortness of breath.   Hyperlipidemia   Pertinent negatives include no chest pain or shortness of breath.   Diabetes   Pertinent negatives for hypoglycemia include no dizziness, nervousness/anxiousness or pallor. Pertinent negatives for diabetes include no chest pain, no shortness of breath  Patient is on acei/arb     The following portions of the patient's history were reviewed and updated as appropriate: allergies, current medications, past family history, past medical history, past social history, past surgical history and problem list.    Review of Systems   Constitutional: Negative for appetite change, fatigue and fever.   HENT: Negative for congestion, ear discharge, ear pain, sinus pressure and sore throat.    Eyes: Negative for pain and discharge.   Respiratory: Negative for cough, chest tightness, shortness of breath and wheezing.    Cardiovascular: Negative for chest pain, palpitations and leg swelling.   Gastrointestinal: Negative for abdominal pain, blood in stool, constipation, diarrhea and nausea.   Endocrine: Negative for cold intolerance and heat intolerance.    Genitourinary: Negative for dysuria, flank pain and frequency.   Musculoskeletal: Negative for back pain and joint swelling.   Skin: Negative for color change.   Allergic/Immunologic: Negative for environmental allergies and food allergies.   Neurological: Negative for dizziness, weakness, numbness and headaches.   Hematological: Negative for adenopathy. Does not bruise/bleed easily.   Psychiatric/Behavioral: Positive for decreased concentration. Negative for behavioral problems and dysphoric mood. The patient is nervous/anxious.          Current Outpatient Medications:   •  albuterol (PROVENTIL HFA;VENTOLIN HFA) 108 (90 Base) MCG/ACT inhaler, Inhale 2 puffs Every 4 (Four) Hours As Needed for Wheezing., Disp: 1 inhaler, Rfl: 0  •  carvedilol (COREG) 25 MG tablet, Take 1 tablet by mouth 2 (Two) Times a Day With Meals., Disp: 180 tablet, Rfl: 3  •  escitalopram (LEXAPRO) 20 MG tablet, Take 1 tablet by mouth Daily., Disp: 90 tablet, Rfl: 3  •  lamoTRIgine (LaMICtal) 25 MG tablet, Take 25 mg by mouth Daily., Disp: , Rfl:   •  levothyroxine (SYNTHROID, LEVOTHROID) 88 MCG tablet, Take 1 tablet by mouth Daily., Disp: 90 tablet, Rfl: 3  •  Misc. Devices (COMMODE BEDSIDE) misc, Use as directed, Disp: 1 each, Rfl: 0  •  olmesartan (BENICAR) 40 MG tablet, Take 1 tablet by mouth Daily., Disp: 90 tablet, Rfl: 3  •  pantoprazole (PROTONIX) 40 MG EC tablet, Take 1 tablet 30 minutes before breakfast daily., Disp: 30 tablet, Rfl: 3  •  pravastatin (PRAVACHOL) 40 MG tablet, Take 1 tablet by mouth Daily., Disp: 90 tablet, Rfl: 3  •  QUEtiapine (SEROquel) 25 MG tablet, Take 25 mg by mouth Every Night., Disp: , Rfl:   •  spironolactone-hydrochlorothiazide (ALDACTAZIDE) 25-25 MG tablet, Take 1 tablet by mouth Daily., Disp: , Rfl:   •  timolol (BETIMOL) 0.5 % ophthalmic solution, Administer 1 drop to both eyes Daily., Disp: , Rfl:   •  travoprost, BAK free, (TRAVATAN Z) 0.004 % solution ophthalmic solution, Administer 1 drop to both  "eyes Daily., Disp: , Rfl:   •  traZODone (DESYREL) 100 MG tablet, Take 1 tablet by mouth Every Night., Disp: 90 tablet, Rfl: 3    Objective     Blood pressure 138/76, pulse 64, temperature 97.3 °F (36.3 °C), resp. rate 16, height 165.1 cm (65\"), weight 98.4 kg (217 lb), SpO2 95 %, not currently breastfeeding.    Physical Exam   Constitutional: She is oriented to person, place, and time. She appears well-developed and well-nourished. No distress.   HENT:   Head: Normocephalic and atraumatic.   Right Ear: External ear normal.   Left Ear: External ear normal.   Nose: Nose normal.   Mouth/Throat: Oropharynx is clear and moist.   Eyes: Conjunctivae and EOM are normal. Pupils are equal, round, and reactive to light.   Neck: Neck supple. No thyromegaly present.   Cardiovascular: Normal rate, regular rhythm and normal heart sounds.   Pulmonary/Chest: Effort normal and breath sounds normal. No respiratory distress.   Abdominal: Soft. Bowel sounds are normal. She exhibits no distension. There is no tenderness. There is no rebound.   Musculoskeletal: Normal range of motion. She exhibits no edema.   Lymphadenopathy:     She has no cervical adenopathy.   Neurological: She is alert and oriented to person, place, and time.   No gross motor or sensory deficits   Skin: Skin is warm. She is not diaphoretic.   Psychiatric: She has a normal mood and affect.   Nursing note and vitals reviewed.    Patient's Body mass index is 36.11 kg/m². BMI is above normal parameters. Recommendations include: educational material, exercise counseling and nutrition counseling.      Results for orders placed or performed in visit on 02/06/19   Basic Metabolic Panel   Result Value Ref Range    Glucose 175 (H) 74 - 98 mg/dL    BUN 23 (H) 7 - 20 mg/dL    Creatinine 1.40 (H) 0.60 - 1.30 mg/dL    Sodium 130 (L) 137 - 145 mmol/L    Potassium 5.0 3.5 - 5.1 mmol/L    Chloride 100 98 - 107 mmol/L    CO2 24.0 (L) 26.0 - 30.0 mmol/L    Calcium 8.9 8.4 - 10.2 mg/dL "    eGFR  African Amer 44 (L) >60 mL/min/1.73    BUN/Creatinine Ratio 16.4 7.1 - 23.5    Anion Gap 11.0 10.0 - 20.0 mmol/L         Assessment/Plan   Valerie was seen today for hypertension, hyperlipidemia, diabetes, hypothyroidism, memory loss and anxiety.    Diagnoses and all orders for this visit:    Benign essential hypertension    Mixed hyperlipidemia  -     CBC & Differential  -     Comprehensive Metabolic Panel  -     Lipid Panel    Controlled type 2 diabetes mellitus without complication, without long-term current use of insulin (CMS/[facility])  -     Hemoglobin A1c    Adult onset hypothyroidism  -     TSH    Anxiety    Late onset Alzheimer's disease with behavioral disturbance    Other orders  -     levothyroxine (SYNTHROID, LEVOTHROID) 88 MCG tablet; Take 1 tablet by mouth Daily.  -     escitalopram (LEXAPRO) 20 MG tablet; Take 1 tablet by mouth Daily.  -     carvedilol (COREG) 25 MG tablet; Take 1 tablet by mouth 2 (Two) Times a Day With Meals.    Plan:  1.  Benign essential hypertension: Will continue current medication, low-sodium diet advised  2.mixed hyperlipidemia:  reviewed  fasting CMP and lipid panel.  Diet and exercise counseled,  Will continue current medications- pravastatin  ,ldl 108  3.  Controlled diabetes mellitus     :   reviewed  fasting CMP  and hba1c 6.2  , diet and exercise counseled  4. hypothyroidism:  reviewed  tsh , and continue levothyroxine  5. Anxiety : labs reviewed , refilled lexapro  6. alzheimers dementia : on namenda per [facility] neurology             Katarzyna Griggs MD

## 2019-07-30 ENCOUNTER — OFFICE VISIT (OUTPATIENT)
Dept: INTERNAL MEDICINE | Facility: CLINIC | Age: 84
End: 2019-07-30

## 2019-07-30 DIAGNOSIS — Z00.00 ROUTINE GENERAL MEDICAL EXAMINATION AT A HEALTH CARE FACILITY: Primary | ICD-10-CM

## 2019-07-30 DIAGNOSIS — E78.2 MIXED HYPERLIPIDEMIA: ICD-10-CM

## 2019-07-30 DIAGNOSIS — I10 BENIGN ESSENTIAL HYPERTENSION: ICD-10-CM

## 2019-07-30 PROCEDURE — G0439 PPPS, SUBSEQ VISIT: HCPCS | Performed by: INTERNAL MEDICINE

## 2019-08-01 LAB
ALBUMIN SERPL-MCNC: 3.5 G/DL (ref 3.5–5)
ALBUMIN/GLOB SERPL: 1.1 G/DL (ref 1–2)
ALP SERPL-CCNC: 53 U/L (ref 38–126)
ALT SERPL-CCNC: 21 U/L (ref 13–69)
AST SERPL-CCNC: 30 U/L (ref 15–46)
BASOPHILS # BLD AUTO: 0.05 10*3/MM3 (ref 0–0.2)
BASOPHILS NFR BLD AUTO: 1 % (ref 0–1.5)
BILIRUB SERPL-MCNC: 1 MG/DL (ref 0.2–1.3)
BUN SERPL-MCNC: 9 MG/DL (ref 7–20)
BUN/CREAT SERPL: 9 (ref 7.1–23.5)
CALCIUM SERPL-MCNC: 8.9 MG/DL (ref 8.4–10.2)
CHLORIDE SERPL-SCNC: 103 MMOL/L (ref 98–107)
CHOLEST SERPL-MCNC: 205 MG/DL (ref 0–199)
CO2 SERPL-SCNC: 30 MMOL/L (ref 26–30)
CREAT SERPL-MCNC: 1 MG/DL (ref 0.6–1.3)
EOSINOPHIL # BLD AUTO: 0.22 10*3/MM3 (ref 0–0.4)
EOSINOPHIL NFR BLD AUTO: 4.3 % (ref 0.3–6.2)
ERYTHROCYTE [DISTWIDTH] IN BLOOD BY AUTOMATED COUNT: 12.4 % (ref 12.3–15.4)
GLOBULIN SER CALC-MCNC: 3.1 GM/DL
GLUCOSE SERPL-MCNC: 153 MG/DL (ref 74–98)
HBA1C MFR BLD: 6.8 % (ref 4.8–5.6)
HCT VFR BLD AUTO: 42.2 % (ref 34–46.6)
HDLC SERPL-MCNC: 71 MG/DL (ref 40–60)
HGB BLD-MCNC: 13.1 G/DL (ref 12–15.9)
IMM GRANULOCYTES # BLD AUTO: 0.02 10*3/MM3 (ref 0–0.05)
IMM GRANULOCYTES NFR BLD AUTO: 0.4 % (ref 0–0.5)
LDLC SERPL CALC-MCNC: 119 MG/DL (ref 0–99)
LYMPHOCYTES # BLD AUTO: 1.79 10*3/MM3 (ref 0.7–3.1)
LYMPHOCYTES NFR BLD AUTO: 34.9 % (ref 19.6–45.3)
MCH RBC QN AUTO: 30 PG (ref 26.6–33)
MCHC RBC AUTO-ENTMCNC: 31 G/DL (ref 31.5–35.7)
MCV RBC AUTO: 96.8 FL (ref 79–97)
MONOCYTES # BLD AUTO: 0.43 10*3/MM3 (ref 0.1–0.9)
MONOCYTES NFR BLD AUTO: 8.4 % (ref 5–12)
NEUTROPHILS # BLD AUTO: 2.62 10*3/MM3 (ref 1.7–7)
NEUTROPHILS NFR BLD AUTO: 51 % (ref 42.7–76)
NRBC BLD AUTO-RTO: 0 /100 WBC (ref 0–0.2)
PLATELET # BLD AUTO: 130 10*3/MM3 (ref 140–450)
POTASSIUM SERPL-SCNC: 4 MMOL/L (ref 3.5–5.1)
PROT SERPL-MCNC: 6.6 G/DL (ref 6.3–8.2)
RBC # BLD AUTO: 4.36 10*6/MM3 (ref 3.77–5.28)
SODIUM SERPL-SCNC: 140 MMOL/L (ref 137–145)
TRIGL SERPL-MCNC: 74 MG/DL
TSH SERPL DL<=0.005 MIU/L-ACNC: 4.76 MIU/ML (ref 0.47–4.68)
VLDLC SERPL CALC-MCNC: 14.8 MG/DL
WBC # BLD AUTO: 5.13 10*3/MM3 (ref 3.4–10.8)

## 2019-08-05 VITALS
OXYGEN SATURATION: 95 % | HEIGHT: 65 IN | TEMPERATURE: 97.1 F | RESPIRATION RATE: 16 BRPM | BODY MASS INDEX: 31.82 KG/M2 | DIASTOLIC BLOOD PRESSURE: 76 MMHG | SYSTOLIC BLOOD PRESSURE: 130 MMHG | WEIGHT: 191 LBS | HEART RATE: 64 BPM

## 2019-08-10 ENCOUNTER — APPOINTMENT (OUTPATIENT)
Dept: CT IMAGING | Facility: HOSPITAL | Age: 84
End: 2019-08-10

## 2019-08-10 ENCOUNTER — HOSPITAL ENCOUNTER (EMERGENCY)
Facility: HOSPITAL | Age: 84
Discharge: HOME | End: 2019-08-10
Attending: EMERGENCY MEDICINE | Admitting: EMERGENCY MEDICINE

## 2019-08-10 ENCOUNTER — APPOINTMENT (OUTPATIENT)
Dept: GENERAL RADIOLOGY | Facility: HOSPITAL | Age: 84
End: 2019-08-10

## 2019-08-10 VITALS
WEIGHT: 199 LBS | RESPIRATION RATE: 18 BRPM | SYSTOLIC BLOOD PRESSURE: 159 MMHG | TEMPERATURE: 98.7 F | OXYGEN SATURATION: 94 % | DIASTOLIC BLOOD PRESSURE: 73 MMHG | HEIGHT: 64 IN | BODY MASS INDEX: 33.97 KG/M2 | HEART RATE: 54 BPM

## 2019-08-10 DIAGNOSIS — I10 ACCELERATED HYPERTENSION: ICD-10-CM

## 2019-08-10 DIAGNOSIS — N39.0 URINARY TRACT INFECTION WITHOUT HEMATURIA, SITE UNSPECIFIED: ICD-10-CM

## 2019-08-10 DIAGNOSIS — I50.9 ACUTE HEART FAILURE, UNSPECIFIED HEART FAILURE TYPE (HCC): Primary | ICD-10-CM

## 2019-08-10 DIAGNOSIS — K59.00 CONSTIPATION, UNSPECIFIED CONSTIPATION TYPE: ICD-10-CM

## 2019-08-10 LAB
ALBUMIN SERPL-MCNC: 3.7 G/DL (ref 3.5–5.2)
ALBUMIN/GLOB SERPL: 1.2 G/DL
ALP SERPL-CCNC: 51 U/L (ref 39–117)
ALT SERPL W P-5'-P-CCNC: 10 U/L (ref 1–33)
ANION GAP SERPL CALCULATED.3IONS-SCNC: 15.2 MMOL/L (ref 5–15)
AST SERPL-CCNC: 20 U/L (ref 1–32)
BACTERIA UR QL AUTO: ABNORMAL /HPF
BASOPHILS # BLD AUTO: 0.06 10*3/MM3 (ref 0–0.2)
BASOPHILS NFR BLD AUTO: 0.9 % (ref 0–1.5)
BILIRUB SERPL-MCNC: 1 MG/DL (ref 0.2–1.2)
BILIRUB UR QL STRIP: NEGATIVE
BUN BLD-MCNC: 9 MG/DL (ref 8–23)
BUN/CREAT SERPL: 9.7 (ref 7–25)
CALCIUM SPEC-SCNC: 8.7 MG/DL (ref 8.6–10.5)
CHLORIDE SERPL-SCNC: 99 MMOL/L (ref 98–107)
CLARITY UR: ABNORMAL
CO2 SERPL-SCNC: 28.5 MMOL/L (ref 22–29)
COLOR UR: YELLOW
CREAT BLD-MCNC: 0.93 MG/DL (ref 0.57–1)
DEPRECATED RDW RBC AUTO: 43 FL (ref 37–54)
EOSINOPHIL # BLD AUTO: 0.14 10*3/MM3 (ref 0–0.4)
EOSINOPHIL NFR BLD AUTO: 2.1 % (ref 0.3–6.2)
ERYTHROCYTE [DISTWIDTH] IN BLOOD BY AUTOMATED COUNT: 12.3 % (ref 12.3–15.4)
GFR SERPL CREATININE-BSD FRML MDRD: 70 ML/MIN/1.73
GLOBULIN UR ELPH-MCNC: 3 GM/DL
GLUCOSE BLD-MCNC: 255 MG/DL (ref 65–99)
GLUCOSE UR STRIP-MCNC: NEGATIVE MG/DL
HCT VFR BLD AUTO: 39.3 % (ref 34–46.6)
HGB BLD-MCNC: 12.5 G/DL (ref 12–15.9)
HGB UR QL STRIP.AUTO: ABNORMAL
HYALINE CASTS UR QL AUTO: ABNORMAL /LPF
IMM GRANULOCYTES # BLD AUTO: 0.02 10*3/MM3 (ref 0–0.05)
IMM GRANULOCYTES NFR BLD AUTO: 0.3 % (ref 0–0.5)
KETONES UR QL STRIP: NEGATIVE
LEUKOCYTE ESTERASE UR QL STRIP.AUTO: NEGATIVE
LYMPHOCYTES # BLD AUTO: 1.24 10*3/MM3 (ref 0.7–3.1)
LYMPHOCYTES NFR BLD AUTO: 18.2 % (ref 19.6–45.3)
MCH RBC QN AUTO: 30 PG (ref 26.6–33)
MCHC RBC AUTO-ENTMCNC: 31.8 G/DL (ref 31.5–35.7)
MCV RBC AUTO: 94.5 FL (ref 79–97)
MONOCYTES # BLD AUTO: 0.45 10*3/MM3 (ref 0.1–0.9)
MONOCYTES NFR BLD AUTO: 6.6 % (ref 5–12)
MUCOUS THREADS URNS QL MICRO: ABNORMAL /HPF
NEUTROPHILS # BLD AUTO: 4.91 10*3/MM3 (ref 1.7–7)
NEUTROPHILS NFR BLD AUTO: 71.9 % (ref 42.7–76)
NITRITE UR QL STRIP: NEGATIVE
NRBC BLD AUTO-RTO: 0 /100 WBC (ref 0–0.2)
NT-PROBNP SERPL-MCNC: 970.8 PG/ML (ref 5–1800)
PH UR STRIP.AUTO: 6 [PH] (ref 5–8)
PLATELET # BLD AUTO: 132 10*3/MM3 (ref 140–450)
PMV BLD AUTO: 12 FL (ref 6–12)
POTASSIUM BLD-SCNC: 3.7 MMOL/L (ref 3.5–5.2)
PROT SERPL-MCNC: 6.7 G/DL (ref 6–8.5)
PROT UR QL STRIP: NEGATIVE
RBC # BLD AUTO: 4.16 10*6/MM3 (ref 3.77–5.28)
RBC # UR: ABNORMAL /HPF
REF LAB TEST METHOD: ABNORMAL
SODIUM BLD-SCNC: 139 MMOL/L (ref 136–145)
SP GR UR STRIP: 1.01 (ref 1–1.03)
SQUAMOUS #/AREA URNS HPF: ABNORMAL /HPF
TROPONIN T SERPL-MCNC: <0.01 NG/ML (ref 0–0.03)
UROBILINOGEN UR QL STRIP: ABNORMAL
WBC NRBC COR # BLD: 6.82 10*3/MM3 (ref 3.4–10.8)
WBC UR QL AUTO: ABNORMAL /HPF

## 2019-08-10 PROCEDURE — 71045 X-RAY EXAM CHEST 1 VIEW: CPT

## 2019-08-10 PROCEDURE — 96375 TX/PRO/DX INJ NEW DRUG ADDON: CPT

## 2019-08-10 PROCEDURE — 96374 THER/PROPH/DIAG INJ IV PUSH: CPT

## 2019-08-10 PROCEDURE — 87086 URINE CULTURE/COLONY COUNT: CPT | Performed by: EMERGENCY MEDICINE

## 2019-08-10 PROCEDURE — 99285 EMERGENCY DEPT VISIT HI MDM: CPT

## 2019-08-10 PROCEDURE — 94640 AIRWAY INHALATION TREATMENT: CPT

## 2019-08-10 PROCEDURE — 85025 COMPLETE CBC W/AUTO DIFF WBC: CPT | Performed by: EMERGENCY MEDICINE

## 2019-08-10 PROCEDURE — 25010000002 HYDRALAZINE PER 20 MG: Performed by: EMERGENCY MEDICINE

## 2019-08-10 PROCEDURE — 93005 ELECTROCARDIOGRAM TRACING: CPT | Performed by: EMERGENCY MEDICINE

## 2019-08-10 PROCEDURE — 83880 ASSAY OF NATRIURETIC PEPTIDE: CPT | Performed by: EMERGENCY MEDICINE

## 2019-08-10 PROCEDURE — 25010000002 FUROSEMIDE PER 20 MG: Performed by: EMERGENCY MEDICINE

## 2019-08-10 PROCEDURE — 80053 COMPREHEN METABOLIC PANEL: CPT | Performed by: EMERGENCY MEDICINE

## 2019-08-10 PROCEDURE — P9612 CATHETERIZE FOR URINE SPEC: HCPCS

## 2019-08-10 PROCEDURE — 74176 CT ABD & PELVIS W/O CONTRAST: CPT

## 2019-08-10 PROCEDURE — 84484 ASSAY OF TROPONIN QUANT: CPT | Performed by: EMERGENCY MEDICINE

## 2019-08-10 PROCEDURE — 81001 URINALYSIS AUTO W/SCOPE: CPT | Performed by: EMERGENCY MEDICINE

## 2019-08-10 RX ORDER — CEPHALEXIN 250 MG/1
500 CAPSULE ORAL ONCE
Status: COMPLETED | OUTPATIENT
Start: 2019-08-10 | End: 2019-08-10

## 2019-08-10 RX ORDER — IPRATROPIUM BROMIDE AND ALBUTEROL SULFATE 2.5; .5 MG/3ML; MG/3ML
3 SOLUTION RESPIRATORY (INHALATION) ONCE
Status: DISCONTINUED | OUTPATIENT
Start: 2019-08-10 | End: 2019-08-10

## 2019-08-10 RX ORDER — SODIUM CHLORIDE 0.9 % (FLUSH) 0.9 %
10 SYRINGE (ML) INJECTION AS NEEDED
Status: DISCONTINUED | OUTPATIENT
Start: 2019-08-10 | End: 2019-08-10 | Stop reason: HOSPADM

## 2019-08-10 RX ORDER — HYDRALAZINE HYDROCHLORIDE 25 MG/1
25 TABLET, FILM COATED ORAL ONCE
Status: COMPLETED | OUTPATIENT
Start: 2019-08-10 | End: 2019-08-10

## 2019-08-10 RX ORDER — FUROSEMIDE 40 MG/1
40 TABLET ORAL DAILY
Qty: 5 TABLET | Refills: 0 | Status: SHIPPED | OUTPATIENT
Start: 2019-08-10 | End: 2019-08-15 | Stop reason: SDUPTHER

## 2019-08-10 RX ORDER — LISINOPRIL 20 MG/1
20 TABLET ORAL ONCE
Status: DISCONTINUED | OUTPATIENT
Start: 2019-08-10 | End: 2019-08-10

## 2019-08-10 RX ORDER — CEPHALEXIN 500 MG/1
500 CAPSULE ORAL 3 TIMES DAILY
Qty: 21 CAPSULE | Refills: 0 | Status: SHIPPED | OUTPATIENT
Start: 2019-08-10 | End: 2019-08-13 | Stop reason: SINTOL

## 2019-08-10 RX ORDER — FUROSEMIDE 10 MG/ML
40 INJECTION INTRAMUSCULAR; INTRAVENOUS ONCE
Status: COMPLETED | OUTPATIENT
Start: 2019-08-10 | End: 2019-08-10

## 2019-08-10 RX ORDER — IPRATROPIUM BROMIDE AND ALBUTEROL SULFATE 2.5; .5 MG/3ML; MG/3ML
3 SOLUTION RESPIRATORY (INHALATION) ONCE
Status: COMPLETED | OUTPATIENT
Start: 2019-08-10 | End: 2019-08-10

## 2019-08-10 RX ORDER — HYDRALAZINE HYDROCHLORIDE 20 MG/ML
10 INJECTION INTRAMUSCULAR; INTRAVENOUS ONCE
Status: COMPLETED | OUTPATIENT
Start: 2019-08-10 | End: 2019-08-10

## 2019-08-10 RX ORDER — HYDRALAZINE HYDROCHLORIDE 25 MG/1
25 TABLET, FILM COATED ORAL 2 TIMES DAILY
Qty: 10 TABLET | Refills: 0 | Status: SHIPPED | OUTPATIENT
Start: 2019-08-10 | End: 2019-08-13 | Stop reason: SDUPTHER

## 2019-08-10 RX ADMIN — HYDRALAZINE HYDROCHLORIDE 25 MG: 25 TABLET, FILM COATED ORAL at 18:11

## 2019-08-10 RX ADMIN — NITROGLYCERIN 1 INCH: 20 OINTMENT TOPICAL at 15:21

## 2019-08-10 RX ADMIN — CEPHALEXIN 500 MG: 250 CAPSULE ORAL at 18:11

## 2019-08-10 RX ADMIN — IPRATROPIUM BROMIDE AND ALBUTEROL SULFATE 3 ML: .5; 3 SOLUTION RESPIRATORY (INHALATION) at 13:35

## 2019-08-10 RX ADMIN — FUROSEMIDE 40 MG: 10 INJECTION, SOLUTION INTRAMUSCULAR; INTRAVENOUS at 15:21

## 2019-08-10 RX ADMIN — HYDRALAZINE HYDROCHLORIDE 10 MG: 20 INJECTION INTRAMUSCULAR; INTRAVENOUS at 16:18

## 2019-08-10 NOTE — ED PROVIDER NOTES
"Subjective   History of Present Illness    Chief Complaint: Wheezing, weakness  History of Present Illness: Patient presents with general weakness for the past 4 days and wheezing over the last 1 day.  Family notes some edema which is new.  Patient reports constipation for the last 3 weeks  Onset: Last 4 days  Duration: Persistent  Exacerbating / Alleviating factors: None  Associated symptoms: None      Nurses Notes reviewed and agree, including vitals, allergies, social history and prior medical history.     REVIEW OF SYSTEMS: All systems reviewed and not pertinent unless noted.    Positive for: General weakness, wheezing, edema, constipation    Negative for: Fever, cough  Review of Systems    Past Medical History:   Diagnosis Date   • Abnormal weight loss    • Acute bronchitis    • Allergic rhinitis    • Anxiety    • Arthritis    • Benign neoplasm of large bowel     Colon polyp   • Body piercing     EARS   • Cellulitis    • Confusion     DAUGHTER REPORTS \"SHE'S A SUN DOWNER\"    • Diabetes (CMS/[facility]) 2018   • Diarrhea    • Diverticulitis    • Diverticulitis of colon    • Dizziness    • Dysphagia     DAUGHTER REPORTS SHE HAS NOTICED THAT PATIENT GETS CHOKED EASILY    • Early onset Alzheimer's disease with behavioral disturbance    • Edema     DAUGHTER REPORTS OF BLE'S   • Elevated cholesterol    • Enthesopathy of knee    • Esophageal reflux    • Fainting    • Full dentures     DAUGHTER INSTRUCTED NO ADHESIVES THE DOS   • Glaucoma    • Hearing loss     NO USE OF HEARING AIDS   • Hyperlipidemia    • Hypertension    • Hypothyroidism    • Knee pain    • Loss of memory    • Mastodynia    • Osteoarthritis of knee    • Polyp of colon    • Proteinuria    • Seasonal allergies    • Subacute delirium    • Vertigo    • Vitamin D deficiency    • Wears glasses        Allergies   Allergen Reactions   • Aricept [Donepezil Hcl] Confusion   • Aspirin Other (See Comments)     DAUGHTER REPORTS HER MOTHER HAS ALWAYS LISTED AS AN ALLERGY " BUT THAT SHE DOES NOT KNOW REACTION TYPE     • Lactose Intolerance (Gi) GI Intolerance   • Metformin Diarrhea   • Penicillins Unknown (See Comments)     Daughter reports patient has been allergic to this since she was a child and has not had it since so she is unsure what it done to her.       Past Surgical History:   Procedure Laterality Date   • CATARACT EXTRACTION Bilateral    • COLONOSCOPY     • ENDOSCOPY N/A 2018    Procedure: ESOPHAGOGASTRODUODENOSCOPY WITH BIOPSY AND DILIATATION;  Surgeon: Yunior Acosta MD;  Location: [facility] ENDOSCOPY;  Service: Gastroenterology   • HYSTERECTOMY      and bilateral salpingo-oophorectomy   • JOINT REPLACEMENT Right     KNEE REPLACEMENT   • MOUTH SURGERY      FULL MOUTH EXTRACTION       Family History   Problem Relation Age of Onset   • Pneumonia Mother         CAUSED HER DEATH IN HER 70'S   • Heart attack Father    • Stroke Other    • Colon cancer Neg Hx    • Cirrhosis Neg Hx    • Liver disease Neg Hx    • Liver cancer Neg Hx    • Stomach cancer Neg Hx    • Esophageal cancer Neg Hx    • Crohn's disease Neg Hx    • Ulcerative colitis Neg Hx        Social History     Socioeconomic History   • Marital status: Single     Spouse name: Not on file   • Number of children: Not on file   • Years of education: Not on file   • Highest education level: Not on file   Tobacco Use   • Smoking status: Former Smoker     Packs/day: 0.50     Years: 25.00     Pack years: 12.50     Types: Cigarettes     Last attempt to quit:      Years since quittin.6   • Smokeless tobacco: Never Used   Substance and Sexual Activity   • Alcohol use: No   • Drug use: No   • Sexual activity: Defer           Objective   Physical Exam      GENERAL APPEARANCE: Elderly female no acute distress   VITAL SIGNS: per nursing, reviewed and noted  SKIN: no rashes, ulcerations or petechiae.  Head: Normocephalic, atraumatic.   EYES: perrla. EOMI.  ENT:  TM clear, posterior pharynx patent.  LUNGS:   normal breath sounds. No retractions.   CARDIOVASCULAR:  regular rate and rhythm, no murmurs.  Good Peripheral pulses.  +1 pretibial edema  ABDOMEN: Soft, nontender, hyperactive bowel sounds. No hernia. No ascites.  MUSCULOSKELETAL:  No tenderness. Full ROM. Strength and tone normal.  NEUROLOGIC: Alert, oriented x 3. No gross deficits.   NECK: Supple, symmetric. No tenderness, no masses. Full ROM  Back: full rom, no paraspinal spasm. No CVA tenderness.   : no bladder tenderness or distention, no CVA tenderness      Procedures           ED Course  ED Course as of Aug 10 1805   Sat Aug 10, 2019   1554 Dr. Angela, recommended additional bp control. Re-evaluate.   [PF]   1638 EKG interpreted by me reveals sinus rhythm with nonspecific T wave changes at a rate 61  [PF]      ED Course User Index  [PF] Bj Jones DO   Patient diuresed to 1200 mL of urine.  Oxygen saturations are stable without any supplemental O2    Patient declined and oriented here.  Family will do enema at home.  Patient is possible urinary tract infection with some surface contamination.  We will do Keflex due to reported urinary frequency.  We will add hydralazine 25 mg twice daily due to elevated blood pressure.  T no other home medications.  We will also add to Lasix 40 mg once daily.  Advised outpatient follow-up with her family doctor this week for reevaluation, return to ER sooner as needed          MDM      Final diagnoses:   Acute heart failure, unspecified heart failure type (CMS/HCC)   Accelerated hypertension   Constipation, unspecified constipation type   Urinary tract infection without hematuria, site unspecified            Bj Jones DO  08/10/19 1805

## 2019-08-12 ENCOUNTER — TELEPHONE (OUTPATIENT)
Dept: INTERNAL MEDICINE | Facility: CLINIC | Age: 84
End: 2019-08-12

## 2019-08-12 ENCOUNTER — EPISODE CHANGES (OUTPATIENT)
Dept: CASE MANAGEMENT | Facility: OTHER | Age: 84
End: 2019-08-12

## 2019-08-12 LAB — BACTERIA SPEC AEROBE CULT: NO GROWTH

## 2019-08-12 NOTE — TELEPHONE ENCOUNTER
Patient daughter called and states patient was seen on 8/10/19 for fluid build up due to blood pressure medication. She states she was told she was in early congestive heart failure. She was told to follow up but wants to know if she needs to come in since she was just seen. She states she is currently taking lasix for 3-4 days per er visit. Please advise.

## 2019-08-13 ENCOUNTER — PATIENT OUTREACH (OUTPATIENT)
Dept: CASE MANAGEMENT | Facility: OTHER | Age: 84
End: 2019-08-13

## 2019-08-13 ENCOUNTER — OFFICE VISIT (OUTPATIENT)
Dept: INTERNAL MEDICINE | Facility: CLINIC | Age: 84
End: 2019-08-13

## 2019-08-13 VITALS
SYSTOLIC BLOOD PRESSURE: 188 MMHG | HEIGHT: 64 IN | OXYGEN SATURATION: 100 % | BODY MASS INDEX: 38.07 KG/M2 | HEART RATE: 94 BPM | WEIGHT: 223 LBS | RESPIRATION RATE: 16 BRPM | TEMPERATURE: 96.9 F | DIASTOLIC BLOOD PRESSURE: 82 MMHG

## 2019-08-13 DIAGNOSIS — R60.0 LOCALIZED EDEMA: ICD-10-CM

## 2019-08-13 DIAGNOSIS — I50.9 ACUTE CONGESTIVE HEART FAILURE, UNSPECIFIED HEART FAILURE TYPE (HCC): ICD-10-CM

## 2019-08-13 DIAGNOSIS — R06.02 SHORTNESS OF BREATH: ICD-10-CM

## 2019-08-13 DIAGNOSIS — I10 BENIGN ESSENTIAL HYPERTENSION: Primary | ICD-10-CM

## 2019-08-13 PROCEDURE — 99214 OFFICE O/P EST MOD 30 MIN: CPT | Performed by: INTERNAL MEDICINE

## 2019-08-13 RX ORDER — HYDRALAZINE HYDROCHLORIDE 25 MG/1
25 TABLET, FILM COATED ORAL 3 TIMES DAILY
Qty: 90 TABLET | Refills: 0 | Status: SHIPPED | OUTPATIENT
Start: 2019-08-13 | End: 2019-08-15 | Stop reason: SDUPTHER

## 2019-08-13 RX ORDER — POTASSIUM CHLORIDE 750 MG/1
10 TABLET, FILM COATED, EXTENDED RELEASE ORAL DAILY
Qty: 30 TABLET | Refills: 0 | Status: SHIPPED | OUTPATIENT
Start: 2019-08-13 | End: 2019-08-15 | Stop reason: SDUPTHER

## 2019-08-13 NOTE — OUTREACH NOTE
Care Plan Note      Responses   Lifestyle Goals  Routine follow-up with doctor(s)   Barriers  Disease education   Self Management  Medication Adherence   Annual Wellness Visit:   Patient Has Completed   Care Gaps Addressed  Other (See Comment) [No Care Gaps]   Specific Disease Process Teaching  Heart Failure   Other Patient Education/Resources   24/7 [facility] Nurse Call Line   24/7 Nurse Call Line Education Method  Verbal   Does patient have depression diagnosis?  No   Advanced Directives:  Patient Has   Ed Visits past 12 months:  1   Hospitalizations past 12 months  None   Medication Adherence  Medications understood [Understood by Daughter, whom she live with]        The main concerns and/or symptoms the patient would like to address are:  Talked with daughterJaki, whom patient lives with.  She said patient is doing a little better.  She saw PCP this AM; medications adjusted; daughter voiced understanding of the changes.  Stated patient has Alzheimer's and she manages her health care needs; drives her to appts; administers medications (crushes meds and puts in applesauce).  Daughter states she is checking patient's Blood Pressure TID.    Education/instruction provided by Care Coordinator:   Explained role of Care Advisor and contact information given to daughter.  Discussed ED discharge instructions.  Daughter stated patient had allergic reaction to the antibiotic, so Dr. Griggs took her off of it.  Daughter trying to encourage water intake.  Discussed CHF management:  Importance of daily weights, keeping a log, SAMANTHA diet, elevating LE's when swolen, compliance with  Medications.  AWV is up to date.  Advanced Directives are on file. MyChart is active; no questions regarding use.  Daughter given phone number to the 24/7 Nurse Line ([phone]).  Follow up appt with Dr. Griggs is this Thursday, 8-15-19; daughter taking her.      Follow Up Outreach Due:   In 1-2 weeks or as needed.    Marium Davis  RN    8/13/2019, 5:11 PM

## 2019-08-13 NOTE — PROGRESS NOTES
.  Subjective   Valerie Davila is a 83 y.o. female.     Chief Complaint   Patient presents with   • Follow-up     ED 8/10/2019       History of Present Illness       The following portions of the patient's history were reviewed and updated as appropriate: allergies, current medications, past family history, past medical history, past social history, past surgical history and problem list.    Review of Systems   Constitutional: Positive for unexpected weight change. Negative for appetite change.   HENT: Negative for dental problem, mouth sores, sore throat, trouble swallowing and voice change.    Eyes: Negative for photophobia, discharge, redness and visual disturbance.   Respiratory: Positive for cough and wheezing. Negative for apnea, chest tightness and shortness of breath.    Cardiovascular: Positive for leg swelling. Negative for chest pain and palpitations.   Gastrointestinal: Negative for abdominal distention, constipation, nausea and vomiting.   Endocrine: Negative for cold intolerance and heat intolerance.   Genitourinary: Negative for dysuria and hematuria.   Musculoskeletal: Negative for back pain, neck pain and neck stiffness.   Skin: Negative for color change, pallor and rash.   Allergic/Immunologic: Negative for environmental allergies.   Neurological: Negative for dizziness, seizures, syncope and headaches.   Psychiatric/Behavioral: Positive for confusion. Negative for agitation and sleep disturbance.         Current Outpatient Medications:   •  albuterol (PROVENTIL HFA;VENTOLIN HFA) 108 (90 Base) MCG/ACT inhaler, Inhale 2 puffs Every 4 (Four) Hours As Needed for Wheezing., Disp: 1 inhaler, Rfl: 0  •  carvedilol (COREG) 25 MG tablet, Take 1 tablet by mouth 2 (Two) Times a Day With Meals., Disp: 180 tablet, Rfl: 3  •  escitalopram (LEXAPRO) 20 MG tablet, Take 1 tablet by mouth Daily., Disp: 90 tablet, Rfl: 3  •  furosemide (LASIX) 40 MG tablet, Take 1 tablet by mouth Daily., Disp: 5 tablet, Rfl: 0  •  " hydrALAZINE (APRESOLINE) 25 MG tablet, Take 1 tablet by mouth 2 (Two) Times a Day., Disp: 10 tablet, Rfl: 0  •  lamoTRIgine (LaMICtal) 25 MG tablet, Take 25 mg by mouth Daily., Disp: , Rfl:   •  levothyroxine (SYNTHROID, LEVOTHROID) 88 MCG tablet, Take 1 tablet by mouth Daily., Disp: 90 tablet, Rfl: 3  •  Misc. Devices (COMMODE BEDSIDE) misc, Use as directed, Disp: 1 each, Rfl: 0  •  olmesartan (BENICAR) 40 MG tablet, Take 1 tablet by mouth Daily., Disp: 90 tablet, Rfl: 3  •  pantoprazole (PROTONIX) 40 MG EC tablet, Take 1 tablet 30 minutes before breakfast daily., Disp: 30 tablet, Rfl: 3  •  pravastatin (PRAVACHOL) 40 MG tablet, Take 1 tablet by mouth Daily., Disp: 90 tablet, Rfl: 3  •  QUEtiapine (SEROquel) 25 MG tablet, Take 25 mg by mouth Every Night., Disp: , Rfl:   •  timolol (BETIMOL) 0.5 % ophthalmic solution, Administer 1 drop to both eyes Daily., Disp: , Rfl:   •  travoprost, TA free, (TRAVATAN Z) 0.004 % solution ophthalmic solution, Administer 1 drop to both eyes Daily., Disp: , Rfl:   •  traZODone (DESYREL) 100 MG tablet, Take 1 tablet by mouth Every Night., Disp: 90 tablet, Rfl: 3  •  cephalexin (KEFLEX) 500 MG capsule, Take 1 capsule by mouth 3 (Three) Times a Day., Disp: 21 capsule, Rfl: 0    Objective     Blood pressure (!) 188/82, pulse 94, temperature 96.9 °F (36.1 °C), resp. rate 16, height 162.6 cm (64.02\"), weight 101 kg (223 lb), SpO2 100 %, not currently breastfeeding.    Physical Exam  Patient's Body mass index is 38.26 kg/m². BMI is {BMI range:08274}.      Results for orders placed or performed during the hospital encounter of 08/10/19   Urine Culture - Urine, Urine, Catheter   Result Value Ref Range    Urine Culture No growth    Comprehensive Metabolic Panel   Result Value Ref Range    Glucose 255 (H) 65 - 99 mg/dL    BUN 9 8 - 23 mg/dL    Creatinine 0.93 0.57 - 1.00 mg/dL    Sodium 139 136 - 145 mmol/L    Potassium 3.7 3.5 - 5.2 mmol/L    Chloride 99 98 - 107 mmol/L    CO2 28.5 22.0 - " 29.0 mmol/L    Calcium 8.7 8.6 - 10.5 mg/dL    Total Protein 6.7 6.0 - 8.5 g/dL    Albumin 3.70 3.50 - 5.20 g/dL    ALT (SGPT) 10 1 - 33 U/L    AST (SGOT) 20 1 - 32 U/L    Alkaline Phosphatase 51 39 - 117 U/L    Total Bilirubin 1.0 0.2 - 1.2 mg/dL    eGFR  African Amer 70 >60 mL/min/1.73    Globulin 3.0 gm/dL    A/G Ratio 1.2 g/dL    BUN/Creatinine Ratio 9.7 7.0 - 25.0    Anion Gap 15.2 (H) 5.0 - 15.0 mmol/L   Troponin   Result Value Ref Range    Troponin T <0.010 0.000 - 0.030 ng/mL   BNP   Result Value Ref Range    proBNP 970.8 5.0-1,800.0 pg/mL   Urinalysis With Culture If Indicated - Urine, Catheter   Result Value Ref Range    Color, UA Yellow Yellow, Straw    Appearance, UA Cloudy (A) Clear    pH, UA 6.0 5.0 - 8.0    Specific Gravity, UA 1.013 1.005 - 1.030    Glucose, UA Negative Negative    Ketones, UA Negative Negative    Bilirubin, UA Negative Negative    Blood, UA Trace (A) Negative    Protein, UA Negative Negative    Leuk Esterase, UA Negative Negative    Nitrite, UA Negative Negative    Urobilinogen, UA 1.0 E.U./dL 0.2 - 1.0 E.U./dL   CBC Auto Differential   Result Value Ref Range    WBC 6.82 3.40 - 10.80 10*3/mm3    RBC 4.16 3.77 - 5.28 10*6/mm3    Hemoglobin 12.5 12.0 - 15.9 g/dL    Hematocrit 39.3 34.0 - 46.6 %    MCV 94.5 79.0 - 97.0 fL    MCH 30.0 26.6 - 33.0 pg    MCHC 31.8 31.5 - 35.7 g/dL    RDW 12.3 12.3 - 15.4 %    RDW-SD 43.0 37.0 - 54.0 fl    MPV 12.0 6.0 - 12.0 fL    Platelets 132 (L) 140 - 450 10*3/mm3    Neutrophil % 71.9 42.7 - 76.0 %    Lymphocyte % 18.2 (L) 19.6 - 45.3 %    Monocyte % 6.6 5.0 - 12.0 %    Eosinophil % 2.1 0.3 - 6.2 %    Basophil % 0.9 0.0 - 1.5 %    Immature Grans % 0.3 0.0 - 0.5 %    Neutrophils, Absolute 4.91 1.70 - 7.00 10*3/mm3    Lymphocytes, Absolute 1.24 0.70 - 3.10 10*3/mm3    Monocytes, Absolute 0.45 0.10 - 0.90 10*3/mm3    Eosinophils, Absolute 0.14 0.00 - 0.40 10*3/mm3    Basophils, Absolute 0.06 0.00 - 0.20 10*3/mm3    Immature Grans, Absolute 0.02 0.00 -  0.05 10*3/mm3    nRBC 0.0 0.0 - 0.2 /100 WBC   Urinalysis, Microscopic Only - Urine, Catheter   Result Value Ref Range    RBC, UA 0-2 (A) None Seen /HPF    WBC, UA 6-12 (A) None Seen /HPF    Bacteria, UA 2+ (A) None Seen /HPF    Squamous Epithelial Cells, UA 7-12 (A) None Seen, 0-2 /HPF    Hyaline Casts, UA None Seen None Seen /LPF    Mucus, UA Trace None Seen, Trace /HPF    Methodology Manual Light Microscopy          Assessment/Plan   {Assess/PlanSmartLinks:46590}             Ranjana Rowe, APRN Student

## 2019-08-14 NOTE — PROGRESS NOTES
Subjective   Valerie Davila is a 83 y.o. female.     Chief Complaint   Patient presents with   • Follow-up     ED 8/10/2019   • Hypertension   • Leg Swelling   • Shortness of Breath   • Constipation       History of Present Illness   Patient is here to follow up on blood pressure which is noted to be elevated , she  was taken to the emergency department at [facility] for increased shortness of breath, edema, weakness, and wheezing on 08/10/19.  She was treated with lasix in the emergency department and sent home on lasix 40 mg once a day and  hydralazine 25 mg twice a day.  The patient was also found to have a UTI and was sent home on Keflex but she had a reaction (flush, wheezing, and irritability) to the medication and the daughter discontinued. The daughter states she has improved but she still has some swelling in her legs. The daughter states her home readings for blood pressure have been high since her medication was adjusted a few months ago. She also had ct abdomen done which shows constipation and is using otc medications for it , her wheezing has resolved per daughter    The following portions of the patient's history were reviewed and updated as appropriate: allergies, current medications, past family history, past medical history, past social history, past surgical history and problem list.    Review of Systems   Constitutional: Negative for appetite change, fatigue and fever.   HENT: Negative for congestion, ear discharge, ear pain, sinus pressure and sore throat.    Eyes: Negative for pain and discharge.   Respiratory: Positive for shortness of breath. Negative for cough, chest tightness and wheezing.    Cardiovascular: Positive for leg swelling. Negative for chest pain and palpitations.   Gastrointestinal: Negative for abdominal pain, blood in stool, constipation, diarrhea and nausea.   Endocrine: Negative for cold intolerance and heat intolerance.   Genitourinary: Negative for  dysuria, flank pain and frequency.   Musculoskeletal: Negative for back pain and joint swelling.   Skin: Negative for color change.   Allergic/Immunologic: Negative for environmental allergies and food allergies.   Neurological: Negative for dizziness, weakness, numbness and headaches.   Hematological: Negative for adenopathy. Does not bruise/bleed easily.   Psychiatric/Behavioral: Negative for behavioral problems and dysphoric mood. The patient is not nervous/anxious.          Current Outpatient Medications:   •  albuterol (PROVENTIL HFA;VENTOLIN HFA) 108 (90 Base) MCG/ACT inhaler, Inhale 2 puffs Every 4 (Four) Hours As Needed for Wheezing., Disp: 1 inhaler, Rfl: 0  •  carvedilol (COREG) 25 MG tablet, Take 1 tablet by mouth 2 (Two) Times a Day With Meals., Disp: 180 tablet, Rfl: 3  •  escitalopram (LEXAPRO) 20 MG tablet, Take 1 tablet by mouth Daily., Disp: 90 tablet, Rfl: 3  •  furosemide (LASIX) 40 MG tablet, Take 1 tablet by mouth Daily., Disp: 5 tablet, Rfl: 0  •  hydrALAZINE (APRESOLINE) 25 MG tablet, Take 1 tablet by mouth 3 (Three) Times a Day., Disp: 90 tablet, Rfl: 0  •  lamoTRIgine (LaMICtal) 25 MG tablet, Take 25 mg by mouth Daily., Disp: , Rfl:   •  levothyroxine (SYNTHROID, LEVOTHROID) 88 MCG tablet, Take 1 tablet by mouth Daily., Disp: 90 tablet, Rfl: 3  •  Misc. Devices ([facility]) misc, Use as directed, Disp: 1 each, Rfl: 0  •  olmesartan (BENICAR) 40 MG tablet, Take 1 tablet by mouth Daily., Disp: 90 tablet, Rfl: 3  •  pantoprazole (PROTONIX) 40 MG EC tablet, Take 1 tablet 30 minutes before breakfast daily., Disp: 30 tablet, Rfl: 3  •  pravastatin (PRAVACHOL) 40 MG tablet, Take 1 tablet by mouth Daily., Disp: 90 tablet, Rfl: 3  •  QUEtiapine (SEROquel) 25 MG tablet, Take 25 mg by mouth Every Night., Disp: , Rfl:   •  timolol (BETIMOL) 0.5 % ophthalmic solution, Administer 1 drop to both eyes Daily., Disp: , Rfl:   •  travoprost, BAK free, (TRAVATAN Z) 0.004 % solution ophthalmic solution,  "Administer 1 drop to both eyes Daily., Disp: , Rfl:   •  traZODone (DESYREL) 100 MG tablet, Take 1 tablet by mouth Every Night., Disp: 90 tablet, Rfl: 3  •  potassium chloride (K-DUR) 10 MEQ CR tablet, Take 1 tablet by mouth Daily., Disp: 30 tablet, Rfl: 0    Objective     Blood pressure (!) 188/82, pulse 94, temperature 96.9 °F (36.1 °C), resp. rate 16, height 162.6 cm (64.02\"), weight 101 kg (223 lb), SpO2 100 %, not currently breastfeeding.    Physical Exam   Constitutional: She appears well-developed and well-nourished. No distress.   HENT:   Head: Normocephalic and atraumatic.   Right Ear: External ear normal.   Left Ear: External ear normal.   Nose: Nose normal.   Mouth/Throat: Oropharynx is clear and moist.   Eyes: Conjunctivae and EOM are normal. Pupils are equal, round, and reactive to light.   Neck: Neck supple. No thyromegaly present.   Cardiovascular: Normal rate, regular rhythm and normal heart sounds.   Pulmonary/Chest: Effort normal and breath sounds normal. No respiratory distress.   Abdominal: Soft. Bowel sounds are normal. She exhibits no distension. There is no tenderness. There is no rebound.   Musculoskeletal: Normal range of motion. She exhibits edema.   Lymphadenopathy:     She has no cervical adenopathy.   Neurological: She is alert.   No gross motor or sensory deficits   Skin: Skin is warm. She is not diaphoretic.   Psychiatric: She has a normal mood and affect.   Nursing note and vitals reviewed.    Patient's Body mass index is 38.26 kg/m². BMI is above normal parameters. Recommendations include: educational material, exercise counseling and nutrition counseling.      Results for orders placed or performed during the hospital encounter of 08/10/19   Urine Culture - Urine, Urine, Catheter   Result Value Ref Range    Urine Culture No growth    Comprehensive Metabolic Panel   Result Value Ref Range    Glucose 255 (H) 65 - 99 mg/dL    BUN 9 8 - 23 mg/dL    Creatinine 0.93 0.57 - 1.00 mg/dL    " Sodium 139 136 - 145 mmol/L    Potassium 3.7 3.5 - 5.2 mmol/L    Chloride 99 98 - 107 mmol/L    CO2 28.5 22.0 - 29.0 mmol/L    Calcium 8.7 8.6 - 10.5 mg/dL    Total Protein 6.7 6.0 - 8.5 g/dL    Albumin 3.70 3.50 - 5.20 g/dL    ALT (SGPT) 10 1 - 33 U/L    AST (SGOT) 20 1 - 32 U/L    Alkaline Phosphatase 51 39 - 117 U/L    Total Bilirubin 1.0 0.2 - 1.2 mg/dL    eGFR  African Amer 70 >60 mL/min/1.73    Globulin 3.0 gm/dL    A/G Ratio 1.2 g/dL    BUN/Creatinine Ratio 9.7 7.0 - 25.0    Anion Gap 15.2 (H) 5.0 - 15.0 mmol/L   Troponin   Result Value Ref Range    Troponin T <0.010 0.000 - 0.030 ng/mL   BNP   Result Value Ref Range    proBNP 970.8 5.0-1,800.0 pg/mL   Urinalysis With Culture If Indicated - Urine, Catheter   Result Value Ref Range    Color, UA Yellow Yellow, Straw    Appearance, UA Cloudy (A) Clear    pH, UA 6.0 5.0 - 8.0    Specific Gravity, UA 1.013 1.005 - 1.030    Glucose, UA Negative Negative    Ketones, UA Negative Negative    Bilirubin, UA Negative Negative    Blood, UA Trace (A) Negative    Protein, UA Negative Negative    Leuk Esterase, UA Negative Negative    Nitrite, UA Negative Negative    Urobilinogen, UA 1.0 E.U./dL 0.2 - 1.0 E.U./dL   CBC Auto Differential   Result Value Ref Range    WBC 6.82 3.40 - 10.80 10*3/mm3    RBC 4.16 3.77 - 5.28 10*6/mm3    Hemoglobin 12.5 12.0 - 15.9 g/dL    Hematocrit 39.3 34.0 - 46.6 %    MCV 94.5 79.0 - 97.0 fL    MCH 30.0 26.6 - 33.0 pg    MCHC 31.8 31.5 - 35.7 g/dL    RDW 12.3 12.3 - 15.4 %    RDW-SD 43.0 37.0 - 54.0 fl    MPV 12.0 6.0 - 12.0 fL    Platelets 132 (L) 140 - 450 10*3/mm3    Neutrophil % 71.9 42.7 - 76.0 %    Lymphocyte % 18.2 (L) 19.6 - 45.3 %    Monocyte % 6.6 5.0 - 12.0 %    Eosinophil % 2.1 0.3 - 6.2 %    Basophil % 0.9 0.0 - 1.5 %    Immature Grans % 0.3 0.0 - 0.5 %    Neutrophils, Absolute 4.91 1.70 - 7.00 10*3/mm3    Lymphocytes, Absolute 1.24 0.70 - 3.10 10*3/mm3    Monocytes, Absolute 0.45 0.10 - 0.90 10*3/mm3    Eosinophils, Absolute 0.14  0.00 - 0.40 10*3/mm3    Basophils, Absolute 0.06 0.00 - 0.20 10*3/mm3    Immature Grans, Absolute 0.02 0.00 - 0.05 10*3/mm3    nRBC 0.0 0.0 - 0.2 /100 WBC   Urinalysis, Microscopic Only - Urine, Catheter   Result Value Ref Range    RBC, UA 0-2 (A) None Seen /HPF    WBC, UA 6-12 (A) None Seen /HPF    Bacteria, UA 2+ (A) None Seen /HPF    Squamous Epithelial Cells, UA 7-12 (A) None Seen, 0-2 /HPF    Hyaline Casts, UA None Seen None Seen /LPF    Mucus, UA Trace None Seen, Trace /HPF    Methodology Manual Light Microscopy          Assessment/Plan   Valerie was seen today for follow-up, hypertension, leg swelling, shortness of breath and constipation.    Diagnoses and all orders for this visit:    Benign essential hypertension    Acute congestive heart failure, unspecified heart failure type (CMS/HCC)    Localized edema    Shortness of breath    Other orders  -     hydrALAZINE (APRESOLINE) 25 MG tablet; Take 1 tablet by mouth 3 (Three) Times a Day.  -     potassium chloride (K-DUR) 10 MEQ CR tablet; Take 1 tablet by mouth Daily.      Plan:  1.  Benign essential hypertension: Will continue current medication and - increase to hydralazine 25 mgpo tid and lasix 40 mg qd  With potassium , low-sodium diet advised , monitor vitals   2.  Shortness of breath :  Xray reviewed , will monitor   3. Acute chf : continue lasix , will need cardiology referral   4. Edema : on diuretics             Katarzyna Griggs MD

## 2019-08-15 ENCOUNTER — OFFICE VISIT (OUTPATIENT)
Dept: INTERNAL MEDICINE | Facility: CLINIC | Age: 84
End: 2019-08-15

## 2019-08-15 VITALS
DIASTOLIC BLOOD PRESSURE: 123 MMHG | BODY MASS INDEX: 38.07 KG/M2 | HEART RATE: 62 BPM | SYSTOLIC BLOOD PRESSURE: 151 MMHG | TEMPERATURE: 98.1 F | HEIGHT: 64 IN | RESPIRATION RATE: 16 BRPM | WEIGHT: 223 LBS | OXYGEN SATURATION: 94 %

## 2019-08-15 DIAGNOSIS — I10 ACCELERATED ESSENTIAL HYPERTENSION: Primary | ICD-10-CM

## 2019-08-15 DIAGNOSIS — R06.02 SHORTNESS OF BREATH: ICD-10-CM

## 2019-08-15 DIAGNOSIS — R60.0 LOCALIZED EDEMA: ICD-10-CM

## 2019-08-15 PROCEDURE — 99214 OFFICE O/P EST MOD 30 MIN: CPT | Performed by: INTERNAL MEDICINE

## 2019-08-15 RX ORDER — HYDRALAZINE HYDROCHLORIDE 50 MG/1
50 TABLET, FILM COATED ORAL 3 TIMES DAILY
Qty: 90 TABLET | Refills: 5 | Status: SHIPPED | OUTPATIENT
Start: 2019-08-15 | End: 2020-01-10

## 2019-08-15 RX ORDER — POTASSIUM CHLORIDE 1500 MG/1
20 TABLET, FILM COATED, EXTENDED RELEASE ORAL DAILY
Qty: 30 TABLET | Refills: 5 | Status: SHIPPED | OUTPATIENT
Start: 2019-08-15 | End: 2019-12-14 | Stop reason: SDUPTHER

## 2019-08-15 RX ORDER — FUROSEMIDE 40 MG/1
40 TABLET ORAL DAILY
Qty: 30 TABLET | Refills: 5 | Status: SHIPPED | OUTPATIENT
Start: 2019-08-15 | End: 2019-11-05 | Stop reason: SDUPTHER

## 2019-08-15 RX ORDER — FUROSEMIDE 40 MG/1
40 TABLET ORAL DAILY
Qty: 5 TABLET | Refills: 0 | Status: CANCELLED | OUTPATIENT
Start: 2019-08-15

## 2019-08-15 NOTE — PROGRESS NOTES
Subjective   Valerie Davila is a 83 y.o. female.     Chief Complaint   Patient presents with   • Hypertension   • Leg Swelling   • Shortness of Breath       History of Present Illness   HPI: Patient is here to follow up on the blood pressure  The patient is taking the blood pressure medications as prescribed and has had no side effects. The patient is also here to follow up on leg swelling and shortness of breath, she was started on diuretics but she continues to be symptomatic, the  wheezing has stopped    Hypertension   Pertinent negatives include no chest pain, palpitations or shortness of breath.    The following portions of the patient's history were reviewed and updated as appropriate: allergies, current medications, past family history, past medical history, past social history, past surgical history and problem list.    Review of Systems   Constitutional: Negative for appetite change, fatigue and fever.   HENT: Negative for congestion, ear discharge, ear pain, sinus pressure and sore throat.    Eyes: Negative for pain and discharge.   Respiratory: Positive for shortness of breath. Negative for cough, chest tightness and wheezing.    Cardiovascular: Positive for leg swelling. Negative for chest pain and palpitations.   Gastrointestinal: Negative for abdominal pain, blood in stool, constipation, diarrhea and nausea.   Endocrine: Negative for cold intolerance and heat intolerance.   Genitourinary: Negative for dysuria, flank pain and frequency.   Musculoskeletal: Negative for back pain and joint swelling.   Skin: Negative for color change.   Allergic/Immunologic: Negative for environmental allergies and food allergies.   Neurological: Negative for dizziness, weakness, numbness and headaches.   Hematological: Negative for adenopathy. Does not bruise/bleed easily.   Psychiatric/Behavioral: Negative for behavioral problems and dysphoric mood. The patient is not nervous/anxious.          Current Outpatient  "Medications:   •  albuterol (PROVENTIL HFA;VENTOLIN HFA) 108 (90 Base) MCG/ACT inhaler, Inhale 2 puffs Every 4 (Four) Hours As Needed for Wheezing., Disp: 1 inhaler, Rfl: 0  •  carvedilol (COREG) 25 MG tablet, Take 1 tablet by mouth 2 (Two) Times a Day With Meals., Disp: 180 tablet, Rfl: 3  •  escitalopram (LEXAPRO) 20 MG tablet, Take 1 tablet by mouth Daily., Disp: 90 tablet, Rfl: 3  •  furosemide (LASIX) 40 MG tablet, Take 1 tablet by mouth Daily., Disp: 30 tablet, Rfl: 5  •  hydrALAZINE (APRESOLINE) 50 MG tablet, Take 1 tablet by mouth 3 (Three) Times a Day., Disp: 90 tablet, Rfl: 5  •  lamoTRIgine (LaMICtal) 25 MG tablet, Take 25 mg by mouth Daily., Disp: , Rfl:   •  levothyroxine (SYNTHROID, LEVOTHROID) 88 MCG tablet, Take 1 tablet by mouth Daily., Disp: 90 tablet, Rfl: 3  •  Misc. Devices (COMMODE BEDSIDE) misc, Use as directed, Disp: 1 each, Rfl: 0  •  olmesartan (BENICAR) 40 MG tablet, Take 1 tablet by mouth Daily., Disp: 90 tablet, Rfl: 3  •  pantoprazole (PROTONIX) 40 MG EC tablet, Take 1 tablet 30 minutes before breakfast daily., Disp: 30 tablet, Rfl: 3  •  potassium chloride (K-TAB) 20 MEQ tablet controlled-release ER tablet, Take 1 tablet by mouth Daily., Disp: 30 tablet, Rfl: 5  •  pravastatin (PRAVACHOL) 40 MG tablet, Take 1 tablet by mouth Daily., Disp: 90 tablet, Rfl: 3  •  QUEtiapine (SEROquel) 25 MG tablet, Take 25 mg by mouth Every Night., Disp: , Rfl:   •  timolol (BETIMOL) 0.5 % ophthalmic solution, Administer 1 drop to both eyes Daily., Disp: , Rfl:   •  travoprost, BAK free, (TRAVATAN Z) 0.004 % solution ophthalmic solution, Administer 1 drop to both eyes Daily., Disp: , Rfl:   •  traZODone (DESYREL) 100 MG tablet, Take 1 tablet by mouth Every Night., Disp: 90 tablet, Rfl: 3    Objective     Blood pressure (!) 151/123, pulse 62, temperature 98.1 °F (36.7 °C), resp. rate 16, height 162.6 cm (64.02\"), weight 101 kg (223 lb), SpO2 94 %, not currently breastfeeding.    Physical Exam "   Constitutional: She is oriented to person, place, and time. She appears well-developed and well-nourished. No distress.   HENT:   Head: Normocephalic and atraumatic.   Right Ear: External ear normal.   Left Ear: External ear normal.   Nose: Nose normal.   Mouth/Throat: Oropharynx is clear and moist.   Eyes: Conjunctivae and EOM are normal. Pupils are equal, round, and reactive to light.   Neck: Neck supple. No thyromegaly present.   Cardiovascular: Normal rate, regular rhythm and normal heart sounds.   Pulmonary/Chest: Effort normal and breath sounds normal. No respiratory distress.   Abdominal: Soft. Bowel sounds are normal. She exhibits no distension. There is no tenderness. There is no rebound.   Musculoskeletal: Normal range of motion. She exhibits edema.   Lymphadenopathy:     She has no cervical adenopathy.   Neurological: She is alert and oriented to person, place, and time.   No gross motor or sensory deficits   Skin: Skin is warm. She is not diaphoretic.   Psychiatric: She has a normal mood and affect.   Nursing note and vitals reviewed.    Patient's Body mass index is 38.26 kg/m². BMI is above normal parameters. Recommendations include: educational material, exercise counseling and nutrition counseling.      Results for orders placed or performed during the hospital encounter of 08/10/19   Urine Culture - Urine, Urine, Catheter   Result Value Ref Range    Urine Culture No growth    Comprehensive Metabolic Panel   Result Value Ref Range    Glucose 255 (H) 65 - 99 mg/dL    BUN 9 8 - 23 mg/dL    Creatinine 0.93 0.57 - 1.00 mg/dL    Sodium 139 136 - 145 mmol/L    Potassium 3.7 3.5 - 5.2 mmol/L    Chloride 99 98 - 107 mmol/L    CO2 28.5 22.0 - 29.0 mmol/L    Calcium 8.7 8.6 - 10.5 mg/dL    Total Protein 6.7 6.0 - 8.5 g/dL    Albumin 3.70 3.50 - 5.20 g/dL    ALT (SGPT) 10 1 - 33 U/L    AST (SGOT) 20 1 - 32 U/L    Alkaline Phosphatase 51 39 - 117 U/L    Total Bilirubin 1.0 0.2 - 1.2 mg/dL    eGFR  African Amer  70 >60 mL/min/1.73    Globulin 3.0 gm/dL    A/G Ratio 1.2 g/dL    BUN/Creatinine Ratio 9.7 7.0 - 25.0    Anion Gap 15.2 (H) 5.0 - 15.0 mmol/L   Troponin   Result Value Ref Range    Troponin T <0.010 0.000 - 0.030 ng/mL   BNP   Result Value Ref Range    proBNP 970.8 5.0-1,800.0 pg/mL   Urinalysis With Culture If Indicated - Urine, Catheter   Result Value Ref Range    Color, UA Yellow Yellow, Straw    Appearance, UA Cloudy (A) Clear    pH, UA 6.0 5.0 - 8.0    Specific Gravity, UA 1.013 1.005 - 1.030    Glucose, UA Negative Negative    Ketones, UA Negative Negative    Bilirubin, UA Negative Negative    Blood, UA Trace (A) Negative    Protein, UA Negative Negative    Leuk Esterase, UA Negative Negative    Nitrite, UA Negative Negative    Urobilinogen, UA 1.0 E.U./dL 0.2 - 1.0 E.U./dL   CBC Auto Differential   Result Value Ref Range    WBC 6.82 3.40 - 10.80 10*3/mm3    RBC 4.16 3.77 - 5.28 10*6/mm3    Hemoglobin 12.5 12.0 - 15.9 g/dL    Hematocrit 39.3 34.0 - 46.6 %    MCV 94.5 79.0 - 97.0 fL    MCH 30.0 26.6 - 33.0 pg    MCHC 31.8 31.5 - 35.7 g/dL    RDW 12.3 12.3 - 15.4 %    RDW-SD 43.0 37.0 - 54.0 fl    MPV 12.0 6.0 - 12.0 fL    Platelets 132 (L) 140 - 450 10*3/mm3    Neutrophil % 71.9 42.7 - 76.0 %    Lymphocyte % 18.2 (L) 19.6 - 45.3 %    Monocyte % 6.6 5.0 - 12.0 %    Eosinophil % 2.1 0.3 - 6.2 %    Basophil % 0.9 0.0 - 1.5 %    Immature Grans % 0.3 0.0 - 0.5 %    Neutrophils, Absolute 4.91 1.70 - 7.00 10*3/mm3    Lymphocytes, Absolute 1.24 0.70 - 3.10 10*3/mm3    Monocytes, Absolute 0.45 0.10 - 0.90 10*3/mm3    Eosinophils, Absolute 0.14 0.00 - 0.40 10*3/mm3    Basophils, Absolute 0.06 0.00 - 0.20 10*3/mm3    Immature Grans, Absolute 0.02 0.00 - 0.05 10*3/mm3    nRBC 0.0 0.0 - 0.2 /100 WBC   Urinalysis, Microscopic Only - Urine, Catheter   Result Value Ref Range    RBC, UA 0-2 (A) None Seen /HPF    WBC, UA 6-12 (A) None Seen /HPF    Bacteria, UA 2+ (A) None Seen /HPF    Squamous Epithelial Cells, UA 7-12 (A) None  Seen, 0-2 /HPF    Hyaline Casts, UA None Seen None Seen /LPF    Mucus, UA Trace None Seen, Trace /HPF    Methodology Manual Light Microscopy          Assessment/Plan   Valerie was seen today for hypertension, leg swelling and shortness of breath.    Diagnoses and all orders for this visit:    Accelerated essential hypertension    Localized edema  -     Ambulatory Referral to Cardiology    Shortness of breath  -     Ambulatory Referral to Cardiology    Other orders  -    furosemide (LASIX) 40 MG tablet; Take 1 tablet by mouth Daily.  -     furosemide (LASIX) 40 MG tablet; Take 1 tablet by mouth Daily.  -     hydrALAZINE (APRESOLINE) 50 MG tablet; Take 1 tablet by mouth 3 (Three) Times a Day.  -     potassium chloride (K-TAB) 20 MEQ tablet controlled-release ER tablet; Take 1 tablet by mouth Daily.        Plan:  1.  Benign essential hypertension: Will continue current medication, low-sodium diet advised  2.shortness of breath:   refer to cardiology , We will continue with diuretic  To lasix 40 mg po bid for 3 days and then lasix 40 mg qd unless she has a weight gain of more than 3 lbs  3.  Edema: refer to cardiology ,  Increase diuretic to lasix 40 mg qd and instructions as above          Katarzyna Griggs MD

## 2019-08-19 ENCOUNTER — OFFICE VISIT (OUTPATIENT)
Dept: INTERNAL MEDICINE | Facility: CLINIC | Age: 84
End: 2019-08-19

## 2019-08-19 VITALS
HEIGHT: 64 IN | HEART RATE: 59 BPM | TEMPERATURE: 96.2 F | DIASTOLIC BLOOD PRESSURE: 72 MMHG | BODY MASS INDEX: 36.7 KG/M2 | OXYGEN SATURATION: 93 % | SYSTOLIC BLOOD PRESSURE: 120 MMHG | WEIGHT: 215 LBS | RESPIRATION RATE: 16 BRPM

## 2019-08-19 DIAGNOSIS — F41.9 ANXIETY: ICD-10-CM

## 2019-08-19 DIAGNOSIS — I10 BENIGN ESSENTIAL HYPERTENSION: Primary | ICD-10-CM

## 2019-08-19 DIAGNOSIS — R60.0 LOCALIZED EDEMA: ICD-10-CM

## 2019-08-19 PROCEDURE — 99214 OFFICE O/P EST MOD 30 MIN: CPT | Performed by: INTERNAL MEDICINE

## 2019-08-19 NOTE — PROGRESS NOTES
Subjective   Valerie Davila is a 83 y.o. female.     Chief Complaint   Patient presents with   • Hypertension         • Leg Swelling   • Anxiety       History of Present Illness   HPI: Patient is here to follow up on the blood pressure  The patient is taking the blood pressure medications as prescribed and has had no side effects. The patient is also here to follow up on leg swelling, she is currently on diuretics and she has lost nearly 8 pounds, she has an appointment to see cardiology, her daughter also states patient has anxiety issues but she is currently on Seroquel, she follows up at  neurology   Hypertension   Pertinent negatives include no chest pain, palpitations or shortness of breath.   Hyperlipidemia   Pertinent negatives include no chest pain or shortness of breath.        The following portions of the patient's history were reviewed and updated as appropriate: allergies, current medications, past family history, past medical history, past social history, past surgical history and problem list.    Review of Systems   Cardiovascular: Positive for leg swelling.   Psychiatric/Behavioral: The patient is nervous/anxious.          Current Outpatient Medications:   •  albuterol (PROVENTIL HFA;VENTOLIN HFA) 108 (90 Base) MCG/ACT inhaler, Inhale 2 puffs Every 4 (Four) Hours As Needed for Wheezing., Disp: 1 inhaler, Rfl: 0  •  carvedilol (COREG) 25 MG tablet, Take 1 tablet by mouth 2 (Two) Times a Day With Meals., Disp: 180 tablet, Rfl: 3  •  escitalopram (LEXAPRO) 20 MG tablet, Take 1 tablet by mouth Daily., Disp: 90 tablet, Rfl: 3  •  furosemide (LASIX) 40 MG tablet, Take 1 tablet by mouth Daily., Disp: 30 tablet, Rfl: 5  •  hydrALAZINE (APRESOLINE) 50 MG tablet, Take 1 tablet by mouth 3 (Three) Times a Day., Disp: 90 tablet, Rfl: 5  •  lamoTRIgine (LaMICtal) 25 MG tablet, Take 25 mg by mouth Daily., Disp: , Rfl:   •  levothyroxine (SYNTHROID, LEVOTHROID) 88 MCG tablet, Take 1 tablet by mouth Daily.,  "Disp: 90 tablet, Rfl: 3  •  Misc. Devices (COMMODE BEDSIDE) misc, Use as directed, Disp: 1 each, Rfl: 0  •  olmesartan (BENICAR) 40 MG tablet, Take 1 tablet by mouth Daily., Disp: 90 tablet, Rfl: 3  •  pantoprazole (PROTONIX) 40 MG EC tablet, Take 1 tablet 30 minutes before breakfast daily., Disp: 30 tablet, Rfl: 3  •  potassium chloride (K-TAB) 20 MEQ tablet controlled-release ER tablet, Take 1 tablet by mouth Daily., Disp: 30 tablet, Rfl: 5  •  pravastatin (PRAVACHOL) 40 MG tablet, Take 1 tablet by mouth Daily., Disp: 90 tablet, Rfl: 3  •  QUEtiapine (SEROquel) 25 MG tablet, Take 25 mg by mouth Every Night., Disp: , Rfl:   •  timolol (BETIMOL) 0.5 % ophthalmic solution, Administer 1 drop to both eyes Daily., Disp: , Rfl:   •  travoprost, TA free, (TRAVATAN Z) 0.004 % solution ophthalmic solution, Administer 1 drop to both eyes Daily., Disp: , Rfl:   •  traZODone (DESYREL) 100 MG tablet, Take 1 tablet by mouth Every Night., Disp: 90 tablet, Rfl: 3    Objective     Blood pressure 120/72, pulse 59, temperature 96.2 °F (35.7 °C), resp. rate 16, height 162.6 cm (64.02\"), weight 97.5 kg (215 lb), SpO2 93 %, not currently breastfeeding.    Physical Exam   Constitutional: She is oriented to person, place, and time. She appears well-developed and well-nourished. No distress.   HENT:   Head: Normocephalic and atraumatic.   Right Ear: External ear normal.   Left Ear: External ear normal.   Nose: Nose normal.   Mouth/Throat: Oropharynx is clear and moist.   Eyes: Conjunctivae and EOM are normal. Pupils are equal, round, and reactive to light.   Neck: Neck supple. No thyromegaly present.   Cardiovascular: Normal rate, regular rhythm and normal heart sounds.   Pulmonary/Chest: Effort normal and breath sounds normal. No respiratory distress.   Abdominal: Soft. Bowel sounds are normal. She exhibits no distension. There is no tenderness. There is no rebound.   Musculoskeletal: Normal range of motion. She exhibits edema.   In " wheelchair   Lymphadenopathy:     She has no cervical adenopathy.   Neurological: She is alert and oriented to person, place, and time.   No gross motor or sensory deficits   Skin: Skin is warm. She is not diaphoretic.   Psychiatric: She has a normal mood and affect.   Nursing note and vitals reviewed.    Patient's Body mass index is 36.88 kg/m². BMI is above normal parameters. Recommendations include: educational material, exercise counseling and nutrition counseling.      Results for orders placed or performed in visit on 08/19/19   Basic Metabolic Panel   Result Value Ref Range    Glucose 192 (H) 65 - 99 mg/dL    BUN 13 8 - 23 mg/dL    Creatinine 1.22 (H) 0.57 - 1.00 mg/dL    eGFR Non African Am 42 (L) >60 mL/min/1.73    eGFR African Am 51 (L) >60 mL/min/1.73    BUN/Creatinine Ratio 10.7 7.0 - 25.0    Sodium 139 136 - 145 mmol/L    Potassium 4.6 3.5 - 5.2 mmol/L    Chloride 92 (L) 98 - 107 mmol/L    Total CO2 34.4 (H) 22.0 - 29.0 mmol/L    Calcium 9.3 8.6 - 10.5 mg/dL         Assessment/Plan   Valerie was seen today for hypertension, leg swelling and anxiety.    Diagnoses and all orders for this visit:    Benign essential hypertension  -     Basic Metabolic Panel    Anxiety    Localized edema      Plan:  1.  Benign essential hypertension: Will continue current medication, low-sodium diet advised  2  Anxiety : on seroquel   3. Edema : lasix  40 mgpo qd , prn extra dose for weight gain above 3 lbs ,bmp today          Katarzyna Griggs MD

## 2019-08-20 LAB
BUN SERPL-MCNC: 13 MG/DL (ref 8–23)
BUN/CREAT SERPL: 10.7 (ref 7–25)
CALCIUM SERPL-MCNC: 9.3 MG/DL (ref 8.6–10.5)
CHLORIDE SERPL-SCNC: 92 MMOL/L (ref 98–107)
CO2 SERPL-SCNC: 34.4 MMOL/L (ref 22–29)
CREAT SERPL-MCNC: 1.22 MG/DL (ref 0.57–1)
GLUCOSE SERPL-MCNC: 192 MG/DL (ref 65–99)
POTASSIUM SERPL-SCNC: 4.6 MMOL/L (ref 3.5–5.2)
SODIUM SERPL-SCNC: 139 MMOL/L (ref 136–145)

## 2019-08-24 ENCOUNTER — NURSE TRIAGE (OUTPATIENT)
Dept: CALL CENTER | Facility: HOSPITAL | Age: 84
End: 2019-08-24

## 2019-08-24 NOTE — TELEPHONE ENCOUNTER
"Recommended she be evaluated in ED    Reason for Disposition  • [1] Longstanding confusion (e.g., dementia, stroke) AND [2] worsening    Additional Information  • Negative: [1] Difficult to awaken or acting confused (e.g., disoriented, slurred speech) AND [2] present now AND [3] has diabetes (diabetes mellitus)  • Negative: [1] Difficult to awaken or acting confused (e.g., disoriented, slurred speech) AND [2] present now AND [3] new onset  • Negative: [1] Weakness of the face, arm, or leg on one side of the body AND [2] new onset  • Negative: [1] Numbness of the face, arm, or leg on one side of the body AND [2] new onset  • Negative: [1] Loss of speech or garbled speech AND [2] new onset  • Negative: Difficulty breathing or bluish lips  • Negative: Shock suspected (e.g., cold/pale/clammy skin, too weak to stand, low BP, rapid pulse)  • Negative: Seeing, hearing, or feeling things that are not there (i.e., visual, auditory, or tactile hallucinations)  • Negative: Followed a head injury  • Negative: Drug overdose suspected  • Negative: Sounds like a life-threatening emergency to the triager  • Negative: Alcohol use, abuse or dependence: question or problem related to  • Negative: Drug abuse or dependence: question or problem related to  • Negative: Headache or vomiting  • Negative: Stiff neck (can't touch chin to chest)  • Negative: Bizarre or paranoid behavior  • Negative: Fever > 100.5 F (38.1 C)  • Negative: Patient sounds very sick or weak to the triager  • Negative: [1] Acting confused (e.g., disoriented, slurred speech) AND [2] brief (now gone)    Answer Assessment - Initial Assessment Questions  1. LEVEL OF CONSCIOUSNESS: \"How is he (she, the patient) acting right now?\" (e.g., alert-oriented, confused, lethargic, stuporous, comatose)      Less oriented today, can barely walk, refused to stay up when family got her up  2. ONSET: \"When did the confusion start?\"  (minutes, hours, days)      This morning 07:00  3. " "PATTERN \"Does this come and go, or has it been constant since it started?\"  \"Is it present now?\"      She has alzheimers and they are unsure if has had an event or if this a progression on her disease. No apparent stroke symptoms such as paralysis, facial drooping etc  4. ALCOHOL or DRUGS: \"Has he been drinking alcohol or taking any drugs?\"       No  5. NARCOTIC MEDICATIONS: \"Has he been receiving any narcotic medications?\" (e.g., morphine, Vicodin)      no  6. CAUSE: \"What do you think is causing the confusion?\"       Unsure  7. OTHER SYMPTOMS: \"Are there any other symptoms?\" (e.g., difficulty breathing, headache, fever, weakness)      She is having trouble walking, wants to stay in bed, refusing to eat    Protocols used: CONFUSION - DELIRIUM-ADULT-      "

## 2019-09-11 ENCOUNTER — OFFICE VISIT (OUTPATIENT)
Dept: INTERNAL MEDICINE | Facility: CLINIC | Age: 84
End: 2019-09-11

## 2019-09-11 VITALS
SYSTOLIC BLOOD PRESSURE: 166 MMHG | WEIGHT: 211 LBS | OXYGEN SATURATION: 94 % | DIASTOLIC BLOOD PRESSURE: 80 MMHG | HEART RATE: 54 BPM | HEIGHT: 64 IN | RESPIRATION RATE: 16 BRPM | BODY MASS INDEX: 36.02 KG/M2 | TEMPERATURE: 97.6 F

## 2019-09-11 DIAGNOSIS — E11.9 CONTROLLED TYPE 2 DIABETES MELLITUS WITHOUT COMPLICATION, WITHOUT LONG-TERM CURRENT USE OF INSULIN (HCC): ICD-10-CM

## 2019-09-11 DIAGNOSIS — Z23 NEED FOR INFLUENZA VACCINATION: ICD-10-CM

## 2019-09-11 DIAGNOSIS — E78.2 MIXED HYPERLIPIDEMIA: ICD-10-CM

## 2019-09-11 DIAGNOSIS — E03.8 ADULT ONSET HYPOTHYROIDISM: ICD-10-CM

## 2019-09-11 DIAGNOSIS — I10 BENIGN ESSENTIAL HYPERTENSION: Primary | ICD-10-CM

## 2019-09-11 PROCEDURE — 90653 IIV ADJUVANT VACCINE IM: CPT | Performed by: INTERNAL MEDICINE

## 2019-09-11 PROCEDURE — G0008 ADMIN INFLUENZA VIRUS VAC: HCPCS | Performed by: INTERNAL MEDICINE

## 2019-09-11 PROCEDURE — 99214 OFFICE O/P EST MOD 30 MIN: CPT | Performed by: INTERNAL MEDICINE

## 2019-09-11 RX ORDER — OLMESARTAN MEDOXOMIL 40 MG/1
40 TABLET ORAL DAILY
Qty: 90 TABLET | Refills: 3 | Status: SHIPPED | OUTPATIENT
Start: 2019-09-11 | End: 2019-09-27

## 2019-09-11 RX ORDER — TRAZODONE HYDROCHLORIDE 100 MG/1
100 TABLET ORAL NIGHTLY
Qty: 90 TABLET | Refills: 3 | Status: SHIPPED | OUTPATIENT
Start: 2019-09-11

## 2019-09-11 RX ORDER — PRAVASTATIN SODIUM 40 MG
40 TABLET ORAL DAILY
Qty: 90 TABLET | Refills: 3 | Status: SHIPPED | OUTPATIENT
Start: 2019-09-11 | End: 2020-09-24

## 2019-09-11 NOTE — PROGRESS NOTES
Subjective   Valerie Davila is a 83 y.o. female.     Chief Complaint   Patient presents with   • Hypertension   • Hyperlipidemia   • Diabetes   • Hypothyroidism       History of Present Illness   HPI: Patient is here to follow up on the blood pressure  The patient is taking the blood pressure medications as prescribed and has had no side effects. The patient is also here to follow up on the cholesterol and is trying to follow a diet. The patient is   also to follow-up on thyroid and due to get lab work done .  The patient also needs refills on medications and flu vaccine.   Hyperlipidemia   Pertinent negatives include no chest pain or shortness of breath.   Hypertension   Pertinent negatives include no chest pain, palpitations or shortness of breath.    The following portions of the patient's history were reviewed and updated as appropriate: allergies, current medications, past family history, past medical history, past social history, past surgical history and problem list.    Review of Systems   Constitutional: Negative for appetite change, fatigue and fever.   HENT: Negative for congestion, ear discharge, ear pain, sinus pressure and sore throat.    Eyes: Negative for pain and discharge.   Respiratory: Negative for cough, chest tightness, shortness of breath and wheezing.    Cardiovascular: Negative for chest pain, palpitations and leg swelling.   Gastrointestinal: Negative for abdominal pain, blood in stool, constipation, diarrhea and nausea.   Endocrine: Negative for cold intolerance and heat intolerance.   Genitourinary: Negative for dysuria, flank pain and frequency.   Musculoskeletal: Negative for back pain and joint swelling.   Skin: Negative for color change.   Allergic/Immunologic: Negative for environmental allergies and food allergies.   Neurological: Negative for dizziness, weakness, numbness and headaches.   Hematological: Negative for adenopathy. Does not bruise/bleed easily.    Psychiatric/Behavioral: Negative for behavioral problems and dysphoric mood. The patient is not nervous/anxious.          Current Outpatient Medications:   •  albuterol (PROVENTIL HFA;VENTOLIN HFA) 108 (90 Base) MCG/ACT inhaler, Inhale 2 puffs Every 4 (Four) Hours As Needed for Wheezing., Disp: 1 inhaler, Rfl: 0  •  carvedilol (COREG) 25 MG tablet, Take 1 tablet by mouth 2 (Two) Times a Day With Meals., Disp: 180 tablet, Rfl: 3  •  escitalopram (LEXAPRO) 20 MG tablet, Take 1 tablet by mouth Daily., Disp: 90 tablet, Rfl: 3  •  furosemide (LASIX) 40 MG tablet, Take 1 tablet by mouth Daily., Disp: 30 tablet, Rfl: 5  •  hydrALAZINE (APRESOLINE) 50 MG tablet, Take 1 tablet by mouth 3 (Three) Times a Day., Disp: 90 tablet, Rfl: 5  •  lamoTRIgine (LaMICtal) 25 MG tablet, Take 25 mg by mouth Daily., Disp: , Rfl:   •  levothyroxine (SYNTHROID, LEVOTHROID) 88 MCG tablet, Take 1 tablet by mouth Daily., Disp: 90 tablet, Rfl: 3  •  Misc. Devices ([facility] BEDSIDE) misc, Use as directed, Disp: 1 each, Rfl: 0  •  olmesartan (BENICAR) 40 MG tablet, Take 1 tablet by mouth Daily., Disp: 90 tablet, Rfl: 3  •  pantoprazole (PROTONIX) 40 MG EC tablet, Take 1 tablet 30 minutes before breakfast daily., Disp: 30 tablet, Rfl: 3  •  potassium chloride (K-TAB) 20 MEQ tablet controlled-release ER tablet, Take 1 tablet by mouth Daily., Disp: 30 tablet, Rfl: 5  •  pravastatin (PRAVACHOL) 40 MG tablet, Take 1 tablet by mouth Daily., Disp: 90 tablet, Rfl: 3  •  QUEtiapine (SEROquel) 25 MG tablet, Take 25 mg by mouth Every Night., Disp: , Rfl:   •  timolol (BETIMOL) 0.5 % ophthalmic solution, Administer 1 drop to both eyes Daily., Disp: , Rfl:   •  travoprost, BAK free, (TRAVATAN Z) 0.004 % solution ophthalmic solution, Administer 1 drop to both eyes Daily., Disp: , Rfl:   •  traZODone (DESYREL) 100 MG tablet, Take 1 tablet by mouth Every Night., Disp: 90 tablet, Rfl: 3    Objective     Blood pressure 166/80, pulse 54, temperature 97.6 °F (36.4  "°C), resp. rate 16, height 162.6 cm (64.02\"), weight 95.7 kg (211 lb), SpO2 94 %, not currently breastfeeding.    Physical Exam   Constitutional: She is oriented to person, place, and time. She appears well-developed and well-nourished. No distress.   HENT:   Head: Normocephalic and atraumatic.   Right Ear: External ear normal.   Left Ear: External ear normal.   Nose: Nose normal.   Mouth/Throat: Oropharynx is clear and moist.   Eyes: Conjunctivae and EOM are normal. Pupils are equal, round, and reactive to light.   Neck: Neck supple. No thyromegaly present.   Cardiovascular: Normal rate, regular rhythm and normal heart sounds.   Pulmonary/Chest: Effort normal and breath sounds normal. No respiratory distress.   Abdominal: Soft. Bowel sounds are normal. She exhibits no distension. There is no tenderness. There is no rebound.   Musculoskeletal: Normal range of motion. She exhibits edema.   In wheelchair   Lymphadenopathy:     She has no cervical adenopathy.   Neurological: She is alert and oriented to person, place, and time.   No gross motor or sensory deficits   Skin: Skin is warm. She is not diaphoretic.   Psychiatric: She has a normal mood and affect.   Nursing note and vitals reviewed.    Patient's Body mass index is 36.2 kg/m². BMI is above normal parameters. Recommendations include: educational material, exercise counseling and nutrition counseling.      Results for orders placed or performed in visit on 08/19/19   Basic Metabolic Panel   Result Value Ref Range    Glucose 192 (H) 65 - 99 mg/dL    BUN 13 8 - 23 mg/dL    Creatinine 1.22 (H) 0.57 - 1.00 mg/dL    eGFR Non African Am 42 (L) >60 mL/min/1.73    eGFR African Am 51 (L) >60 mL/min/1.73    BUN/Creatinine Ratio 10.7 7.0 - 25.0    Sodium 139 136 - 145 mmol/L    Potassium 4.6 3.5 - 5.2 mmol/L    Chloride 92 (L) 98 - 107 mmol/L    Total CO2 34.4 (H) 22.0 - 29.0 mmol/L    Calcium 9.3 8.6 - 10.5 mg/dL         Assessment/Plan   Valerie was seen today for " hypertension, hyperlipidemia, diabetes and hypothyroidism.    Diagnoses and all orders for this visit:    Benign essential hypertension    Mixed hyperlipidemia  -     CBC & Differential  -     Comprehensive Metabolic Panel  -     Lipid Panel    Controlled type 2 diabetes mellitus without complication, without long-term current use of insulin (CMS/[facility])  -     Hemoglobin A1c    Adult onset hypothyroidism  -     TSH    Need for influenza vaccination  -     Fluad Tri 65yr ([id number])    Other orders  -     olmesartan (BENICAR) 40 MG tablet; Take 1 tablet by mouth Daily.  -     pravastatin (PRAVACHOL) 40 MG tablet; Take 1 tablet by mouth Daily.  -     traZODone (DESYREL) 100 MG tablet; Take 1 tablet by mouth Every Night.        Plan:  1.  Benign essential hypertension: Will continue current medication, low-sodium diet advised  2.mixed hyperlipidemia: Obtain   fasting CMP and lipid panel.  Diet and exercise counseled,  Will continue current medications  3. Diabetes  Mellitus  : Obtain fasting CMP  and hba1c  , diet and exercise counseled , Will continue current medications  4. hypothyroidism: Obtain Tsh , and continue levothyroxine  5. need for flu vaccine: Given today         Katarzyna Griggs MD

## 2019-09-27 RX ORDER — OLMESARTAN MEDOXOMIL 40 MG/1
TABLET ORAL
Qty: 90 TABLET | Refills: 2 | Status: SHIPPED | OUTPATIENT
Start: 2019-09-27 | End: 2020-06-22 | Stop reason: SDUPTHER

## 2019-11-05 ENCOUNTER — OFFICE VISIT (OUTPATIENT)
Dept: INTERNAL MEDICINE | Facility: CLINIC | Age: 84
End: 2019-11-05

## 2019-11-05 VITALS
SYSTOLIC BLOOD PRESSURE: 147 MMHG | DIASTOLIC BLOOD PRESSURE: 81 MMHG | TEMPERATURE: 98.2 F | HEART RATE: 59 BPM | HEIGHT: 64 IN | WEIGHT: 219 LBS | BODY MASS INDEX: 37.39 KG/M2 | OXYGEN SATURATION: 98 % | RESPIRATION RATE: 16 BRPM

## 2019-11-05 DIAGNOSIS — F51.04 INSOMNIA, PSYCHOPHYSIOLOGICAL: ICD-10-CM

## 2019-11-05 DIAGNOSIS — R60.0 LOCALIZED EDEMA: ICD-10-CM

## 2019-11-05 DIAGNOSIS — G30.1 LATE ONSET ALZHEIMER'S DISEASE WITH BEHAVIORAL DISTURBANCE (HCC): ICD-10-CM

## 2019-11-05 DIAGNOSIS — I10 BENIGN ESSENTIAL HYPERTENSION: Primary | ICD-10-CM

## 2019-11-05 DIAGNOSIS — F02.818 LATE ONSET ALZHEIMER'S DISEASE WITH BEHAVIORAL DISTURBANCE (HCC): ICD-10-CM

## 2019-11-05 PROCEDURE — 99214 OFFICE O/P EST MOD 30 MIN: CPT | Performed by: INTERNAL MEDICINE

## 2019-11-05 RX ORDER — FUROSEMIDE 40 MG/1
40 TABLET ORAL 2 TIMES DAILY
Qty: 60 TABLET | Refills: 5 | Status: SHIPPED | OUTPATIENT
Start: 2019-11-05 | End: 2020-04-09

## 2019-11-05 NOTE — PROGRESS NOTES
Subjective   Valerie Davila is a 84 y.o. female.     Chief Complaint   Patient presents with   • Hypertension   • Memory Loss   • Leg Swelling       History of Present Illness   HPI: Patient is here to follow up on the blood pressure  The patient is taking the blood pressure medications as prescribed and has had no side effects. The patient is also here to follow up on Alzheimer's dementia which is progressively worsening per her daughter, patient is currently on medications and follows up at [facility], her daughter also states patient has been having increased sleep and they decreased the trazodone but that did not work as the patient was trying to get out of the house so they have changed her trazodone dose to 75 mg at bedtime and it is working and helping the patient to sleep, she is also taking Seroquel, patient is also to follow-up on leg swelling and she was seen by cardiology and had an echo done  Hypertension   Pertinent negatives include no chest pain, palpitations or shortness of breath.    The following portions of the patient's history were reviewed and updated as appropriate: allergies, current medications, past family history, past medical history, past social history, past surgical history and problem list.    Review of Systems   Constitutional: Negative for appetite change, fatigue and fever.   HENT: Negative for congestion, ear discharge, ear pain, sinus pressure and sore throat.    Eyes: Negative for pain and discharge.   Respiratory: Negative for cough, chest tightness, shortness of breath and wheezing.    Cardiovascular: Positive for leg swelling. Negative for chest pain and palpitations.   Gastrointestinal: Negative for abdominal pain, blood in stool, constipation, diarrhea and nausea.   Endocrine: Negative for cold intolerance and heat intolerance.   Genitourinary: Negative for dysuria, flank pain and frequency.   Musculoskeletal: Negative for back pain and joint swelling.   Skin:  Negative for color change.   Allergic/Immunologic: Negative for environmental allergies and food allergies.   Neurological: Negative for dizziness, weakness, numbness and headaches.   Hematological: Negative for adenopathy. Does not bruise/bleed easily.   Psychiatric/Behavioral: Positive for behavioral problems and decreased concentration. Negative for dysphoric mood. The patient is not nervous/anxious.          Current Outpatient Medications:   •  albuterol (PROVENTIL HFA;VENTOLIN HFA) 108 (90 Base) MCG/ACT inhaler, Inhale 2 puffs Every 4 (Four) Hours As Needed for Wheezing., Disp: 1 inhaler, Rfl: 0  •  carvedilol (COREG) 25 MG tablet, Take 1 tablet by mouth 2 (Two) Times a Day With Meals., Disp: 180 tablet, Rfl: 3  •  escitalopram (LEXAPRO) 20 MG tablet, Take 1 tablet by mouth Daily., Disp: 90 tablet, Rfl: 3  •  furosemide (LASIX) 40 MG tablet, Take 1 tablet by mouth 2 (Two) Times a Day., Disp: 60 tablet, Rfl: 5  •  hydrALAZINE (APRESOLINE) 50 MG tablet, Take 1 tablet by mouth 3 (Three) Times a Day., Disp: 90 tablet, Rfl: 5  •  lamoTRIgine (LaMICtal) 25 MG tablet, Take 25 mg by mouth Daily., Disp: , Rfl:   •  levothyroxine (SYNTHROID, LEVOTHROID) 88 MCG tablet, Take 1 tablet by mouth Daily., Disp: 90 tablet, Rfl: 3  •  Misc. Devices (COMMODE BEDSIDE) misc, Use as directed, Disp: 1 each, Rfl: 0  •  olmesartan (BENICAR) 40 MG tablet, TAKE ONE TABLET BY MOUTH DAILY, Disp: 90 tablet, Rfl: 2  •  pantoprazole (PROTONIX) 40 MG EC tablet, Take 1 tablet 30 minutes before breakfast daily., Disp: 30 tablet, Rfl: 3  •  potassium chloride (K-TAB) 20 MEQ tablet controlled-release ER tablet, Take 1 tablet by mouth Daily., Disp: 30 tablet, Rfl: 5  •  pravastatin (PRAVACHOL) 40 MG tablet, Take 1 tablet by mouth Daily., Disp: 90 tablet, Rfl: 3  •  QUEtiapine (SEROquel) 25 MG tablet, Take 25 mg by mouth Every Night., Disp: , Rfl:   •  timolol (BETIMOL) 0.5 % ophthalmic solution, Administer 1 drop to both eyes Daily., Disp: , Rfl:  "  •  travoprost, TA free, (TRAVATAN Z) 0.004 % solution ophthalmic solution, Administer 1 drop to both eyes Daily., Disp: , Rfl:   •  traZODone (DESYREL) 100 MG tablet, Take 1 tablet by mouth Every Night., Disp: 90 tablet, Rfl: 3    Objective     Blood pressure 147/81, pulse 59, temperature 98.2 °F (36.8 °C), resp. rate 16, height 162.6 cm (64.02\"), weight 99.3 kg (219 lb), SpO2 98 %, not currently breastfeeding.    Physical Exam   Constitutional: She appears well-developed and well-nourished. No distress.   HENT:   Head: Normocephalic and atraumatic.   Right Ear: External ear normal.   Left Ear: External ear normal.   Nose: Nose normal.   Mouth/Throat: Oropharynx is clear and moist.   Eyes: Conjunctivae and EOM are normal. Pupils are equal, round, and reactive to light.   Neck: Neck supple. No thyromegaly present.   Cardiovascular: Normal rate, regular rhythm and normal heart sounds.   Pulmonary/Chest: Effort normal and breath sounds normal. No respiratory distress.   Abdominal: Soft. Bowel sounds are normal. She exhibits no distension. There is no tenderness. There is no rebound.   Musculoskeletal: She exhibits edema.   In wheelchair   Lymphadenopathy:     She has no cervical adenopathy.   Neurological: She is alert.   No gross motor or sensory deficits   Skin: Skin is warm. She is not diaphoretic.   Psychiatric: She has a normal mood and affect.   Nursing note and vitals reviewed.    Patient's Body mass index is 37.57 kg/m². BMI is above normal parameters. Recommendations include: educational material, exercise counseling and nutrition counseling.      Results for orders placed or performed in visit on 08/19/19   Basic Metabolic Panel   Result Value Ref Range    Glucose 192 (H) 65 - 99 mg/dL    BUN 13 8 - 23 mg/dL    Creatinine 1.22 (H) 0.57 - 1.00 mg/dL    eGFR Non African Am 42 (L) >60 mL/min/1.73    eGFR African Am 51 (L) >60 mL/min/1.73    BUN/Creatinine Ratio 10.7 7.0 - 25.0    Sodium 139 136 - 145 mmol/L "    Potassium 4.6 3.5 - 5.2 mmol/L    Chloride 92 (L) 98 - 107 mmol/L    Total CO2 34.4 (H) 22.0 - 29.0 mmol/L    Calcium 9.3 8.6 - 10.5 mg/dL         Assessment/Plan   Valerie was seen today for hypertension, memory loss and leg swelling.    Diagnoses and all orders for this visit:    Benign essential hypertension    Localized edema    Late onset Alzheimer's disease with behavioral disturbance (CMS/HCC)    Insomnia, psychophysiological    Other orders  -     furosemide (LASIX) 40 MG tablet; Take 1 tablet by mouth 2 (Two) Times a Day.      Plan:  1.  Benign essential hypertension: Will continue current medication, low-sodium diet advised  2. Edema : continue lasix  40 mg qd and extra lasix 20 mg po  For weight gain more than 3 lbs, continue per cardiology  3. alzheimers dementia  : on aricept and namenda  4. Insomnia : continue trazodone 75 mg po qhs           Katarzyna Griggs MD

## 2019-11-25 ENCOUNTER — TELEPHONE (OUTPATIENT)
Dept: INTERNAL MEDICINE | Facility: CLINIC | Age: 84
End: 2019-11-25

## 2019-11-25 NOTE — TELEPHONE ENCOUNTER
Daughter is requesting a nurse come draw labs at her home, states she is having a hard time getting pt to come into office and doesn't think she will be able to get her to go for lab work

## 2019-12-02 ENCOUNTER — TELEPHONE (OUTPATIENT)
Dept: INTERNAL MEDICINE | Facility: CLINIC | Age: 84
End: 2019-12-02

## 2019-12-02 NOTE — TELEPHONE ENCOUNTER
EDVIN FROM PT'S PHARM CALLED IN REGARDS TO THE PATIENT HAVING TROUBLE SWALLOWING  HER POTASSIUM TABLETS AND WOULD LIKE TO KNOW IF SHE COULD GET THE CAPSULE INSTEAD? EDVIN WOULD LIKE TO GET A CALL BACK AT  [phone] IN REGARDS TO THIS MATTER.

## 2019-12-09 LAB
ALBUMIN SERPL-MCNC: 3.8 G/DL (ref 3.5–5.2)
ALBUMIN/GLOB SERPL: 1.4 G/DL
ALP SERPL-CCNC: 56 U/L (ref 39–117)
ALT SERPL-CCNC: 13 U/L (ref 1–33)
AST SERPL-CCNC: 23 U/L (ref 1–32)
BASOPHILS # BLD AUTO: ABNORMAL 10*3/UL
BASOPHILS # BLD MANUAL: 0.12 10*3/MM3 (ref 0–0.2)
BASOPHILS NFR BLD MANUAL: 2 % (ref 0–1.5)
BILIRUB SERPL-MCNC: 0.6 MG/DL (ref 0.2–1.2)
BUN SERPL-MCNC: 14 MG/DL (ref 8–23)
BUN/CREAT SERPL: 11.3 (ref 7–25)
CALCIUM SERPL-MCNC: 9 MG/DL (ref 8.6–10.5)
CHLORIDE SERPL-SCNC: 104 MMOL/L (ref 98–107)
CHOLEST SERPL-MCNC: 207 MG/DL (ref 0–200)
CO2 SERPL-SCNC: 29.6 MMOL/L (ref 22–29)
CREAT SERPL-MCNC: 1.24 MG/DL (ref 0.57–1)
DIFFERENTIAL COMMENT: ABNORMAL
EOSINOPHIL # BLD AUTO: ABNORMAL 10*3/UL
EOSINOPHIL # BLD MANUAL: 0.29 10*3/MM3 (ref 0–0.4)
EOSINOPHIL NFR BLD AUTO: ABNORMAL %
EOSINOPHIL NFR BLD MANUAL: 5 % (ref 0.3–6.2)
ERYTHROCYTE [DISTWIDTH] IN BLOOD BY AUTOMATED COUNT: 12.8 % (ref 12.3–15.4)
GLOBULIN SER CALC-MCNC: 2.8 GM/DL
GLUCOSE SERPL-MCNC: 121 MG/DL (ref 65–99)
HBA1C MFR BLD: 6.3 % (ref 4.8–5.6)
HCT VFR BLD AUTO: 38.7 % (ref 34–46.6)
HDLC SERPL-MCNC: 83 MG/DL (ref 40–60)
HGB BLD-MCNC: 12.7 G/DL (ref 12–15.9)
LDLC SERPL CALC-MCNC: 109 MG/DL (ref 0–100)
LYMPHOCYTES # BLD AUTO: ABNORMAL 10*3/UL
LYMPHOCYTES # BLD MANUAL: 2.02 10*3/MM3 (ref 0.7–3.1)
LYMPHOCYTES NFR BLD AUTO: ABNORMAL %
LYMPHOCYTES NFR BLD MANUAL: 35 % (ref 19.6–45.3)
MCH RBC QN AUTO: 30.4 PG (ref 26.6–33)
MCHC RBC AUTO-ENTMCNC: 32.8 G/DL (ref 31.5–35.7)
MCV RBC AUTO: 92.6 FL (ref 79–97)
MONOCYTES # BLD MANUAL: 0.29 10*3/MM3 (ref 0.1–0.9)
MONOCYTES NFR BLD AUTO: ABNORMAL %
MONOCYTES NFR BLD MANUAL: 5 % (ref 5–12)
NEUTROPHILS # BLD MANUAL: 3.06 10*3/MM3 (ref 1.7–7)
NEUTROPHILS NFR BLD AUTO: ABNORMAL %
NEUTROPHILS NFR BLD MANUAL: 53 % (ref 42.7–76)
PLATELET # BLD AUTO: 137 10*3/MM3 (ref 140–450)
PLATELET BLD QL SMEAR: ABNORMAL
POTASSIUM SERPL-SCNC: 4.3 MMOL/L (ref 3.5–5.2)
PROT SERPL-MCNC: 6.6 G/DL (ref 6–8.5)
RBC # BLD AUTO: 4.18 10*6/MM3 (ref 3.77–5.28)
RBC MORPH BLD: ABNORMAL
SODIUM SERPL-SCNC: 146 MMOL/L (ref 136–145)
TRIGL SERPL-MCNC: 75 MG/DL (ref 0–150)
TSH SERPL DL<=0.005 MIU/L-ACNC: 3.22 UIU/ML (ref 0.27–4.2)
VLDLC SERPL CALC-MCNC: 15 MG/DL
WBC # BLD AUTO: 5.78 10*3/MM3 (ref 3.4–10.8)

## 2019-12-14 DIAGNOSIS — K21.00 GASTROESOPHAGEAL REFLUX DISEASE WITH ESOPHAGITIS: ICD-10-CM

## 2019-12-16 ENCOUNTER — OFFICE VISIT (OUTPATIENT)
Dept: INTERNAL MEDICINE | Facility: CLINIC | Age: 84
End: 2019-12-16

## 2019-12-16 VITALS
TEMPERATURE: 97.6 F | WEIGHT: 216 LBS | DIASTOLIC BLOOD PRESSURE: 79 MMHG | HEIGHT: 64 IN | HEART RATE: 55 BPM | OXYGEN SATURATION: 97 % | BODY MASS INDEX: 36.88 KG/M2 | RESPIRATION RATE: 16 BRPM | SYSTOLIC BLOOD PRESSURE: 166 MMHG

## 2019-12-16 DIAGNOSIS — G30.1 LATE ONSET ALZHEIMER'S DISEASE WITH BEHAVIORAL DISTURBANCE (HCC): ICD-10-CM

## 2019-12-16 DIAGNOSIS — E03.8 ADULT ONSET HYPOTHYROIDISM: ICD-10-CM

## 2019-12-16 DIAGNOSIS — E78.2 MIXED HYPERLIPIDEMIA: ICD-10-CM

## 2019-12-16 DIAGNOSIS — F02.818 LATE ONSET ALZHEIMER'S DISEASE WITH BEHAVIORAL DISTURBANCE (HCC): ICD-10-CM

## 2019-12-16 DIAGNOSIS — I10 BENIGN ESSENTIAL HYPERTENSION: Primary | ICD-10-CM

## 2019-12-16 DIAGNOSIS — R60.0 LOCALIZED EDEMA: ICD-10-CM

## 2019-12-16 DIAGNOSIS — E11.9 CONTROLLED TYPE 2 DIABETES MELLITUS WITHOUT COMPLICATION, WITHOUT LONG-TERM CURRENT USE OF INSULIN (HCC): ICD-10-CM

## 2019-12-16 DIAGNOSIS — N18.2 CHRONIC KIDNEY DISEASE, STAGE II (MILD): ICD-10-CM

## 2019-12-16 PROCEDURE — 99214 OFFICE O/P EST MOD 30 MIN: CPT | Performed by: INTERNAL MEDICINE

## 2019-12-16 RX ORDER — PANTOPRAZOLE SODIUM 40 MG/1
TABLET, DELAYED RELEASE ORAL
Qty: 30 TABLET | Refills: 2 | Status: SHIPPED | OUTPATIENT
Start: 2019-12-16 | End: 2020-04-21 | Stop reason: SDUPTHER

## 2019-12-16 RX ORDER — POTASSIUM CHLORIDE 20MEQ/15ML
20 LIQUID (ML) ORAL DAILY
Qty: 473 ML | Refills: 11 | Status: SHIPPED | OUTPATIENT
Start: 2019-12-16 | End: 2020-06-22 | Stop reason: SDUPTHER

## 2019-12-16 RX ORDER — POTASSIUM CHLORIDE 1500 MG/1
TABLET, FILM COATED, EXTENDED RELEASE ORAL
Qty: 30 TABLET | Refills: 4 | Status: SHIPPED | OUTPATIENT
Start: 2019-12-16 | End: 2019-12-16

## 2019-12-16 NOTE — PROGRESS NOTES
Subjective   Valerie Davila is a 84 y.o. female.     Chief Complaint   Patient presents with   • Hypertension   • Hyperlipidemia   • Diabetes   • Hypothyroidism   • Memory Loss   • Leg Swelling   • Chronic Kidney Disease       History of Present Illness   HPI: Patient is here to follow up on the blood pressure  The patient is taking the blood pressure medications as prescribed and has had no side effects. The patient is also here to follow up on the cholesterol and is trying to follow a diet. The patient is also here to follow on sugar and   Thyroid and had  lab work done . The patient also needs refills on medications .  Patient is also to follow-up on leg swelling and kidney insufficiency and was seen by cardiology with no change in her diuretics, she is also here to follow-up on her dementia and she is accompanied by her daughter today who states she follows up at [facility] neurology  Hypertension   Pertinent negatives include no chest pain, palpitations or shortness of breath.   Hyperlipidemia   Pertinent negatives include no chest pain or shortness of breath.   Diabetes   Pertinent negatives for hypoglycemia include no dizziness, nervousness/anxiousness or pallor. Pertinent negatives for diabetes include no chest pain, no shortness of breath  Patient is on acei/arb     The following portions of the patient's history were reviewed and updated as appropriate: allergies, current medications, past family history, past medical history, past social history, past surgical history and problem list.    Review of Systems   Constitutional: Negative for appetite change, fatigue and fever.   HENT: Negative for congestion, ear discharge, ear pain, sinus pressure and sore throat.    Eyes: Negative for pain and discharge.   Respiratory: Negative for cough, chest tightness, shortness of breath and wheezing.    Cardiovascular: Positive for leg swelling. Negative for chest pain and palpitations.   Gastrointestinal: Negative for  abdominal pain, blood in stool, constipation, diarrhea and nausea.   Endocrine: Negative for cold intolerance and heat intolerance.   Genitourinary: Negative for dysuria, flank pain and frequency.   Musculoskeletal: Negative for back pain and joint swelling.   Skin: Negative for color change.   Allergic/Immunologic: Negative for environmental allergies and food allergies.   Neurological: Negative for dizziness, weakness, numbness and headaches.   Hematological: Negative for adenopathy. Does not bruise/bleed easily.   Psychiatric/Behavioral: Negative for behavioral problems and dysphoric mood. The patient is not nervous/anxious.          Current Outpatient Medications:   •  albuterol (PROVENTIL HFA;VENTOLIN HFA) 108 (90 Base) MCG/ACT inhaler, Inhale 2 puffs Every 4 (Four) Hours As Needed for Wheezing., Disp: 1 inhaler, Rfl: 0  •  carvedilol (COREG) 25 MG tablet, Take 1 tablet by mouth 2 (Two) Times a Day With Meals., Disp: 180 tablet, Rfl: 3  •  escitalopram (LEXAPRO) 20 MG tablet, Take 1 tablet by mouth Daily., Disp: 90 tablet, Rfl: 3  •  furosemide (LASIX) 40 MG tablet, Take 1 tablet by mouth 2 (Two) Times a Day., Disp: 60 tablet, Rfl: 5  •  hydrALAZINE (APRESOLINE) 50 MG tablet, Take 1 tablet by mouth 3 (Three) Times a Day., Disp: 90 tablet, Rfl: 5  •  lamoTRIgine (LaMICtal) 25 MG tablet, Take 25 mg by mouth Daily., Disp: , Rfl:   •  levothyroxine (SYNTHROID, LEVOTHROID) 88 MCG tablet, Take 1 tablet by mouth Daily., Disp: 90 tablet, Rfl: 3  •  Misc. Devices ([facility]) misc, Use as directed, Disp: 1 each, Rfl: 0  •  olmesartan (BENICAR) 40 MG tablet, TAKE ONE TABLET BY MOUTH DAILY, Disp: 90 tablet, Rfl: 2  •  pantoprazole (PROTONIX) 40 MG EC tablet, TAKE ONE TABLET 30 MINUTES BEFORE BREAKFAST DAILY, Disp: 30 tablet, Rfl: 2  •  pravastatin (PRAVACHOL) 40 MG tablet, Take 1 tablet by mouth Daily., Disp: 90 tablet, Rfl: 3  •  QUEtiapine (SEROquel) 25 MG tablet, Take 25 mg by mouth Every Night., Disp: , Rfl:   •   "timolol (BETIMOL) 0.5 % ophthalmic solution, Administer 1 drop to both eyes Daily., Disp: , Rfl:   •  travoprost, TA free, (TRAVATAN Z) 0.004 % solution ophthalmic solution, Administer 1 drop to both eyes Daily., Disp: , Rfl:   •  traZODone (DESYREL) 100 MG tablet, Take 1 tablet by mouth Every Night., Disp: 90 tablet, Rfl: 3  •  potassium chloride (KAYCIEL) 20 MEQ/15ML (10%) solution, Take 15 mL by mouth Daily., Disp: 473 mL, Rfl: 11    Objective     Blood pressure 166/79, pulse 55, temperature 97.6 °F (36.4 °C), resp. rate 16, height 162.6 cm (64.02\"), weight 98 kg (216 lb), SpO2 97 %, not currently breastfeeding.    Physical Exam   Constitutional: She appears well-developed and well-nourished. No distress.   HENT:   Head: Normocephalic and atraumatic.   Right Ear: External ear normal.   Left Ear: External ear normal.   Nose: Nose normal.   Mouth/Throat: Oropharynx is clear and moist.   Eyes: Pupils are equal, round, and reactive to light. Conjunctivae and EOM are normal.   Neck: Neck supple. No thyromegaly present.   Cardiovascular: Normal rate, regular rhythm and normal heart sounds.   Pulmonary/Chest: Effort normal and breath sounds normal. No respiratory distress.   Abdominal: Soft. Bowel sounds are normal. She exhibits no distension. There is no tenderness. There is no rebound.   Musculoskeletal: She exhibits edema.   In wheelchair   Lymphadenopathy:     She has no cervical adenopathy.   Neurological: She is alert.   No gross motor or sensory deficits   Skin: Skin is warm. She is not diaphoretic.   Psychiatric: She has a normal mood and affect.   Nursing note and vitals reviewed.    Patient's Body mass index is 37.06 kg/m². BMI is above normal parameters. Recommendations include: educational material, exercise counseling and nutrition counseling.      Results for orders placed or performed in visit on 09/11/19   Comprehensive Metabolic Panel   Result Value Ref Range    Glucose 121 (H) 65 - 99 mg/dL    BUN 14 " 8 - 23 mg/dL    Creatinine 1.24 (H) 0.57 - 1.00 mg/dL    eGFR Non African Am 41 (L) >60 mL/min/1.73    eGFR African Am 50 (L) >60 mL/min/1.73    BUN/Creatinine Ratio 11.3 7.0 - 25.0    Sodium 146 (H) 136 - 145 mmol/L    Potassium 4.3 3.5 - 5.2 mmol/L    Chloride 104 98 - 107 mmol/L    Total CO2 29.6 (H) 22.0 - 29.0 mmol/L    Calcium 9.0 8.6 - 10.5 mg/dL    Total Protein 6.6 6.0 - 8.5 g/dL    Albumin 3.80 3.50 - 5.20 g/dL    Globulin 2.8 gm/dL    A/G Ratio 1.4 g/dL    Total Bilirubin 0.6 0.2 - 1.2 mg/dL    Alkaline Phosphatase 56 39 - 117 U/L    AST (SGOT) 23 1 - 32 U/L    ALT (SGPT) 13 1 - 33 U/L   Lipid Panel   Result Value Ref Range    Total Cholesterol 207 (H) 0 - 200 mg/dL    Triglycerides 75 0 - 150 mg/dL    HDL Cholesterol 83 (H) 40 - 60 mg/dL    VLDL Cholesterol 15 mg/dL    LDL Cholesterol  109 (H) 0 - 100 mg/dL   Hemoglobin A1c   Result Value Ref Range    Hemoglobin A1C 6.30 (H) 4.80 - 5.60 %   TSH   Result Value Ref Range    TSH 3.220 0.270 - 4.200 uIU/mL   Manual Differential   Result Value Ref Range    Neutrophil Rel % 53.0 42.7 - 76.0 %    Lymphocyte Rel % 35.0 19.6 - 45.3 %    Monocyte Rel % 5.0 5.0 - 12.0 %    Eosinophil Rel % 5.0 0.3 - 6.2 %    Basophil Rel % 2.0 (H) 0.0 - 1.5 %    Neutrophils Absolute 3.06 1.70 - 7.00 10*3/mm3    Lymphocytes Absolute 2.02 0.70 - 3.10 10*3/mm3    Monocytes Absolute 0.29 0.10 - 0.90 10*3/mm3    Eosinophil Abs 0.29 0.00 - 0.40 10*3/mm3    Basophils Absolute 0.12 0.00 - 0.20 10*3/mm3    Differential Comment Comment     Comment Comment     Plt Comment Comment    CBC & Differential   Result Value Ref Range    WBC 5.78 3.40 - 10.80 10*3/mm3    RBC 4.18 3.77 - 5.28 10*6/mm3    Hemoglobin 12.7 12.0 - 15.9 g/dL    Hematocrit 38.7 34.0 - 46.6 %    MCV 92.6 79.0 - 97.0 fL    MCH 30.4 26.6 - 33.0 pg    MCHC 32.8 31.5 - 35.7 g/dL    RDW 12.8 12.3 - 15.4 %    Platelets 137 (L) 140 - 450 10*3/mm3    Neutrophil Rel % CANCELED     Lymphocyte Rel % CANCELED     Monocyte Rel % CANCELED      Eosinophil Rel % CANCELED     Lymphocytes Absolute CANCELED     Eosinophils Absolute CANCELED     Basophils Absolute CANCELED          Assessment/Plan   Valerie was seen today for hypertension, hyperlipidemia, diabetes, hypothyroidism, memory loss, leg swelling and chronic kidney disease.    Diagnoses and all orders for this visit:    Benign essential hypertension    Mixed hyperlipidemia  -     CBC & Differential  -     Comprehensive Metabolic Panel  -     Lipid Panel    Controlled type 2 diabetes mellitus without complication, without long-term current use of insulin (CMS/[facility])  -     Hemoglobin A1c    Adult onset hypothyroidism  -     TSH    Chronic kidney disease, stage II (mild)    Late onset Alzheimer's disease with behavioral disturbance (CMS/[facility])    Localized edema    Other orders  -     potassium chloride (KAYCIEL) 20 MEQ/15ML (10%) solution; Take 15 mL by mouth Daily.        Plan:  1.  Benign essential hypertension: Will continue current medication, low-sodium diet advised  2.mixed hyperlipidemia:  reviewed  fasting CMP and lipid panel.  Diet and exercise counseled,  Will continue current medications  3. Diabetes mellitus  :   reviewed  fasting CMP  and hba1c  , diet and exercise counseled  4. hypothyroidism:  reviewed  tsh , and continue levothyroxine  5. alzheimers demntia : per [facility] neurology, will continue current medication  6. Edema: on diuretics, per cardiology  7. Cri stage 2 : monitor bmp , oral hydraiton , creatinine 1.4            Katarzyna Griggs MD

## 2020-01-10 RX ORDER — HYDRALAZINE HYDROCHLORIDE 50 MG/1
TABLET, FILM COATED ORAL
Qty: 90 TABLET | Refills: 4 | Status: SHIPPED | OUTPATIENT
Start: 2020-01-10 | End: 2020-06-22 | Stop reason: SDUPTHER

## 2020-03-01 ENCOUNTER — EPISODE CHANGES (OUTPATIENT)
Dept: CASE MANAGEMENT | Facility: OTHER | Age: 85
End: 2020-03-01

## 2020-03-10 DIAGNOSIS — K21.00 GASTROESOPHAGEAL REFLUX DISEASE WITH ESOPHAGITIS: ICD-10-CM

## 2020-03-10 RX ORDER — PANTOPRAZOLE SODIUM 40 MG/1
TABLET, DELAYED RELEASE ORAL
Qty: 30 TABLET | Refills: 1 | OUTPATIENT
Start: 2020-03-10

## 2020-03-15 ENCOUNTER — APPOINTMENT (OUTPATIENT)
Dept: CT IMAGING | Facility: HOSPITAL | Age: 85
End: 2020-03-15

## 2020-03-15 ENCOUNTER — APPOINTMENT (OUTPATIENT)
Dept: GENERAL RADIOLOGY | Facility: HOSPITAL | Age: 85
End: 2020-03-15

## 2020-03-15 ENCOUNTER — HOSPITAL ENCOUNTER (EMERGENCY)
Facility: HOSPITAL | Age: 85
Discharge: HOME | End: 2020-03-15
Attending: EMERGENCY MEDICINE | Admitting: EMERGENCY MEDICINE

## 2020-03-15 VITALS
HEART RATE: 52 BPM | RESPIRATION RATE: 16 BRPM | TEMPERATURE: 96.9 F | OXYGEN SATURATION: 96 % | WEIGHT: 212 LBS | DIASTOLIC BLOOD PRESSURE: 71 MMHG | BODY MASS INDEX: 34.07 KG/M2 | SYSTOLIC BLOOD PRESSURE: 182 MMHG | HEIGHT: 66 IN

## 2020-03-15 DIAGNOSIS — R46.89 ABNORMAL BEHAVIOR: Primary | ICD-10-CM

## 2020-03-15 LAB
A-A DO2: 16.5 MMHG
ALBUMIN SERPL-MCNC: 3.7 G/DL (ref 3.5–5.2)
ALBUMIN/GLOB SERPL: 1.1 G/DL
ALP SERPL-CCNC: 56 U/L (ref 39–117)
ALT SERPL W P-5'-P-CCNC: 13 U/L (ref 1–33)
ANION GAP SERPL CALCULATED.3IONS-SCNC: 10.6 MMOL/L (ref 5–15)
ARTERIAL PATENCY WRIST A: ABNORMAL
AST SERPL-CCNC: 23 U/L (ref 1–32)
ATMOSPHERIC PRESS: 745 MMHG
BASE EXCESS BLDA CALC-SCNC: 4.5 MMOL/L (ref 0–2)
BASOPHILS # BLD AUTO: 0.04 10*3/MM3 (ref 0–0.2)
BASOPHILS NFR BLD AUTO: 0.7 % (ref 0–1.5)
BDY SITE: ABNORMAL
BILIRUB SERPL-MCNC: 0.6 MG/DL (ref 0.2–1.2)
BILIRUB UR QL STRIP: NEGATIVE
BUN BLD-MCNC: 14 MG/DL (ref 8–23)
BUN/CREAT SERPL: 12.5 (ref 7–25)
CALCIUM SPEC-SCNC: 9.1 MG/DL (ref 8.6–10.5)
CHLORIDE SERPL-SCNC: 96 MMOL/L (ref 98–107)
CLARITY UR: CLEAR
CO2 SERPL-SCNC: 27.4 MMOL/L (ref 22–29)
COHGB MFR BLD: 0.7 % (ref 0–2)
COLOR UR: YELLOW
CREAT BLD-MCNC: 1.12 MG/DL (ref 0.57–1)
DEPRECATED RDW RBC AUTO: 41.5 FL (ref 37–54)
EOSINOPHIL # BLD AUTO: 0.16 10*3/MM3 (ref 0–0.4)
EOSINOPHIL NFR BLD AUTO: 2.8 % (ref 0.3–6.2)
ERYTHROCYTE [DISTWIDTH] IN BLOOD BY AUTOMATED COUNT: 11.9 % (ref 12.3–15.4)
FLUAV AG NPH QL: NEGATIVE
FLUBV AG NPH QL IA: NEGATIVE
GFR SERPL CREATININE-BSD FRML MDRD: 56 ML/MIN/1.73
GLOBULIN UR ELPH-MCNC: 3.3 GM/DL
GLUCOSE BLD-MCNC: 165 MG/DL (ref 65–99)
GLUCOSE BLDC GLUCOMTR-MCNC: 145 MG/DL (ref 70–130)
GLUCOSE UR STRIP-MCNC: NEGATIVE MG/DL
HCO3 BLDA-SCNC: 29.5 MMOL/L (ref 22–28)
HCT VFR BLD AUTO: 40.2 % (ref 34–46.6)
HCT VFR BLD CALC: 40.4 %
HGB BLD-MCNC: 13.3 G/DL (ref 12–15.9)
HGB BLDA-MCNC: 13.2 G/DL (ref 12–18)
HGB UR QL STRIP.AUTO: NEGATIVE
HOLD SPECIMEN: NORMAL
HOLD SPECIMEN: NORMAL
HOROWITZ INDEX BLD+IHG-RTO: 21 %
IMM GRANULOCYTES # BLD AUTO: 0.02 10*3/MM3 (ref 0–0.05)
IMM GRANULOCYTES NFR BLD AUTO: 0.4 % (ref 0–0.5)
KETONES UR QL STRIP: NEGATIVE
LEUKOCYTE ESTERASE UR QL STRIP.AUTO: NEGATIVE
LYMPHOCYTES # BLD AUTO: 1.63 10*3/MM3 (ref 0.7–3.1)
LYMPHOCYTES NFR BLD AUTO: 28.8 % (ref 19.6–45.3)
MCH RBC QN AUTO: 31 PG (ref 26.6–33)
MCHC RBC AUTO-ENTMCNC: 33.1 G/DL (ref 31.5–35.7)
MCV RBC AUTO: 93.7 FL (ref 79–97)
METHGB BLD QL: 0.8 % (ref 0–1.5)
MODALITY: ABNORMAL
MONOCYTES # BLD AUTO: 0.43 10*3/MM3 (ref 0.1–0.9)
MONOCYTES NFR BLD AUTO: 7.6 % (ref 5–12)
NEUTROPHILS # BLD AUTO: 3.38 10*3/MM3 (ref 1.7–7)
NEUTROPHILS NFR BLD AUTO: 59.7 % (ref 42.7–76)
NITRITE UR QL STRIP: NEGATIVE
NOTE: ABNORMAL
NRBC BLD AUTO-RTO: 0 /100 WBC (ref 0–0.2)
NT-PROBNP SERPL-MCNC: 495.6 PG/ML (ref 5–1800)
OXYHGB MFR BLDV: 95.4 % (ref 94–99)
PCO2 BLDA: 44.1 MM HG (ref 35–45)
PCO2 TEMP ADJ BLD: ABNORMAL MM[HG]
PH BLDA: 7.43 PH UNITS (ref 7.3–7.5)
PH UR STRIP.AUTO: 6.5 [PH] (ref 5–8)
PH, TEMP CORRECTED: ABNORMAL
PLATELET # BLD AUTO: 123 10*3/MM3 (ref 140–450)
PMV BLD AUTO: 12 FL (ref 6–12)
PO2 BLDA: 80.4 MM HG (ref 75–100)
PO2 TEMP ADJ BLD: ABNORMAL MM[HG]
POTASSIUM BLD-SCNC: 4.7 MMOL/L (ref 3.5–5.2)
PROT SERPL-MCNC: 7 G/DL (ref 6–8.5)
PROT UR QL STRIP: NEGATIVE
RBC # BLD AUTO: 4.29 10*6/MM3 (ref 3.77–5.28)
SAO2 % BLDCOA: 96.9 % (ref 94–100)
SODIUM BLD-SCNC: 134 MMOL/L (ref 136–145)
SP GR UR STRIP: 1.01 (ref 1–1.03)
TROPONIN T SERPL-MCNC: <0.01 NG/ML (ref 0–0.03)
UROBILINOGEN UR QL STRIP: NORMAL
VENTILATOR MODE: ABNORMAL
WBC NRBC COR # BLD: 5.66 10*3/MM3 (ref 3.4–10.8)
WHOLE BLOOD HOLD SPECIMEN: NORMAL
WHOLE BLOOD HOLD SPECIMEN: NORMAL

## 2020-03-15 PROCEDURE — 99285 EMERGENCY DEPT VISIT HI MDM: CPT

## 2020-03-15 PROCEDURE — 94799 UNLISTED PULMONARY SVC/PX: CPT

## 2020-03-15 PROCEDURE — 83050 HGB METHEMOGLOBIN QUAN: CPT

## 2020-03-15 PROCEDURE — 80053 COMPREHEN METABOLIC PANEL: CPT | Performed by: NURSE PRACTITIONER

## 2020-03-15 PROCEDURE — 71045 X-RAY EXAM CHEST 1 VIEW: CPT

## 2020-03-15 PROCEDURE — 36600 WITHDRAWAL OF ARTERIAL BLOOD: CPT

## 2020-03-15 PROCEDURE — 70450 CT HEAD/BRAIN W/O DYE: CPT

## 2020-03-15 PROCEDURE — 81003 URINALYSIS AUTO W/O SCOPE: CPT | Performed by: NURSE PRACTITIONER

## 2020-03-15 PROCEDURE — 82805 BLOOD GASES W/O2 SATURATION: CPT

## 2020-03-15 PROCEDURE — 83880 ASSAY OF NATRIURETIC PEPTIDE: CPT | Performed by: NURSE PRACTITIONER

## 2020-03-15 PROCEDURE — 84484 ASSAY OF TROPONIN QUANT: CPT | Performed by: NURSE PRACTITIONER

## 2020-03-15 PROCEDURE — 87804 INFLUENZA ASSAY W/OPTIC: CPT | Performed by: NURSE PRACTITIONER

## 2020-03-15 PROCEDURE — 85025 COMPLETE CBC W/AUTO DIFF WBC: CPT | Performed by: NURSE PRACTITIONER

## 2020-03-15 PROCEDURE — 93005 ELECTROCARDIOGRAM TRACING: CPT | Performed by: EMERGENCY MEDICINE

## 2020-03-15 PROCEDURE — P9612 CATHETERIZE FOR URINE SPEC: HCPCS

## 2020-03-15 PROCEDURE — 82375 ASSAY CARBOXYHB QUANT: CPT

## 2020-03-15 PROCEDURE — 82962 GLUCOSE BLOOD TEST: CPT

## 2020-03-15 RX ORDER — SODIUM CHLORIDE 0.9 % (FLUSH) 0.9 %
10 SYRINGE (ML) INJECTION AS NEEDED
Status: DISCONTINUED | OUTPATIENT
Start: 2020-03-15 | End: 2020-03-15 | Stop reason: HOSPADM

## 2020-03-15 RX ORDER — MEMANTINE HYDROCHLORIDE 10 MG/1
10 TABLET ORAL 2 TIMES DAILY
COMMUNITY

## 2020-03-15 NOTE — ED PROVIDER NOTES
"Subjective   History of Present Illness  This is a 84 year old female who comes in today via EMS for change in level of consciousness.  Her daughter reports that she woke up and was acting normal.  She came down to drink her or she is this morning when she suddenly closed her eyes and would not open them back and her daughter feels like that she is acting funny.  She does have advanced Alzheimer's.  Her daughter reports that she feels like that her mouth is slightly abnormal looking.  Symptom started around 12:30 PM  Review of Systems   Constitutional: Positive for activity change.   HENT: Negative.    Eyes: Negative.    Respiratory: Negative.    Cardiovascular: Negative.    Gastrointestinal: Negative.    Genitourinary: Negative.    Skin: Negative.    Neurological: Negative.    Psychiatric/Behavioral: Positive for confusion.       Past Medical History:   Diagnosis Date   • Abnormal weight loss    • Acute bronchitis    • Allergic rhinitis    • Anxiety    • Arthritis    • Benign neoplasm of large bowel     Colon polyp   • Body piercing     EARS   • Cellulitis    • Confusion     DAUGHTER REPORTS \"SHE'S A SUN DOWNER\"    • Diabetes (CMS/[facility]) 2018   • Diarrhea    • Diverticulitis    • Diverticulitis of colon    • Dizziness    • Dysphagia     DAUGHTER REPORTS SHE HAS NOTICED THAT PATIENT GETS CHOKED EASILY    • Early onset Alzheimer's disease with behavioral disturbance (CMS/[facility])    • Edema     DAUGHTER REPORTS OF BLE'S   • Elevated cholesterol    • Enthesopathy of knee    • Esophageal reflux    • Fainting    • Full dentures     DAUGHTER INSTRUCTED NO ADHESIVES THE DOS   • Glaucoma    • Hearing loss     NO USE OF HEARING AIDS   • Hyperlipidemia    • Hypertension    • Hypothyroidism    • Knee pain    • Loss of memory    • Mastodynia    • Osteoarthritis of knee    • Polyp of colon    • Proteinuria    • Seasonal allergies    • Subacute delirium    • Vertigo    • Vitamin D deficiency    • Wears glasses        Allergies "   Allergen Reactions   • Keflex [Cephalexin] Swelling     Swelling , wheezing, flushing   • Aricept [Donepezil Hcl] Confusion   • Aspirin Other (See Comments)     DAUGHTER REPORTS HER MOTHER HAS ALWAYS LISTED AS AN ALLERGY BUT THAT SHE DOES NOT KNOW REACTION TYPE     • Lactose Intolerance (Gi) GI Intolerance   • Metformin Diarrhea   • Penicillins Unknown (See Comments)     Daughter reports patient has been allergic to this since she was a child and has not had it since so she is unsure what it done to her.       Past Surgical History:   Procedure Laterality Date   • CATARACT EXTRACTION Bilateral    • COLONOSCOPY     • ENDOSCOPY N/A 2018    Procedure: ESOPHAGOGASTRODUODENOSCOPY WITH BIOPSY AND DILIATATION;  Surgeon: Yunior Acosta MD;  Location: [facility] ENDOSCOPY;  Service: Gastroenterology   • HYSTERECTOMY      and bilateral salpingo-oophorectomy   • JOINT REPLACEMENT Right     KNEE REPLACEMENT   • MOUTH SURGERY      FULL MOUTH EXTRACTION       Family History   Problem Relation Age of Onset   • Pneumonia Mother         CAUSED HER DEATH IN HER 70'S   • Heart attack Father    • Stroke Other    • Colon cancer Neg Hx    • Cirrhosis Neg Hx    • Liver disease Neg Hx    • Liver cancer Neg Hx    • Stomach cancer Neg Hx    • Esophageal cancer Neg Hx    • Crohn's disease Neg Hx    • Ulcerative colitis Neg Hx        Social History     Socioeconomic History   • Marital status: Single     Spouse name: Not on file   • Number of children: Not on file   • Years of education: Not on file   • Highest education level: Not on file   Tobacco Use   • Smoking status: Former Smoker     Packs/day: 0.50     Years: 25.00     Pack years: 12.50     Types: Cigarettes     Last attempt to quit:      Years since quittin.2   • Smokeless tobacco: Never Used   Substance and Sexual Activity   • Alcohol use: No   • Drug use: No   • Sexual activity: Defer           Objective   Physical Exam   Constitutional: She appears  well-developed and well-nourished.   Nursing note and vitals reviewed.  GEN: No acute distress  Head: Normocephalic, atraumatic  Eyes: Pupils equal round reactive to light  ENT: Posterior pharynx normal in appearance, oral mucosa is moist  Chest: Nontender to palpation  Cardiovascular: Regular rate  Lungs: Clear to auscultation bilaterally  Abdomen: Soft, nontender, nondistended, no peritoneal signs  Extremities: No edema, normal appearance  Neuro: GCS 14  Psych: Mood and affect are bizarre behavior.  Refuses to open eyes.  But follows some commands.  Unable to perform NIH.  Patient cannot follow commands.    Procedures           ED Course  ED Course as of Mar 15 1627   Sun Mar 15, 2020   1345 EKG interpreted by me: Sinus bradycardia, first-degree AVB, prolonged QT, some nonspecific T wave flattening, this is an abnormal EKG, this appears fairly similar to previous    [MP]   1626 Patient back to her baseline according to her daughter.  I advised her strict return to care instructions.  I went over her labs and x-ray results.  Have given them return to care and have her follow-up with her primary care in the next 24 to 48 hours.  They are agreeable to this plan of care.    [TW]      ED Course User Index  [MP] Ritesh Cox MD  [TW] Anamaria Alexander, APRN                                           MDM  Number of Diagnoses or Management Options     Amount and/or Complexity of Data Reviewed  Clinical lab tests: reviewed and ordered  Tests in the radiology section of CPT®: reviewed and ordered  Review and summarize past medical records: yes  Discuss the patient with other providers: yes  Independent visualization of images, tracings, or specimens: yes    Risk of Complications, Morbidity, and/or Mortality  Presenting problems: moderate  Diagnostic procedures: moderate  Management options: moderate        Final diagnoses:   Abnormal behavior            Anamaria Alexander APRN  03/15/20 1627

## 2020-03-16 ENCOUNTER — EPISODE CHANGES (OUTPATIENT)
Dept: CASE MANAGEMENT | Facility: OTHER | Age: 85
End: 2020-03-16

## 2020-03-18 ENCOUNTER — PATIENT OUTREACH (OUTPATIENT)
Dept: CASE MANAGEMENT | Facility: OTHER | Age: 85
End: 2020-03-18

## 2020-03-18 ENCOUNTER — EPISODE CHANGES (OUTPATIENT)
Dept: CASE MANAGEMENT | Facility: OTHER | Age: 85
End: 2020-03-18

## 2020-03-18 ENCOUNTER — TELEPHONE (OUTPATIENT)
Dept: INTERNAL MEDICINE | Facility: CLINIC | Age: 85
End: 2020-03-18

## 2020-03-18 NOTE — OUTREACH NOTE
Care Coordination Note    Care Coordination with ASHELY Jaquez nurse.  PCP request patient be seen relative to DME needs.  RN-ACM will contact patient caregiver/POA with information.      Abi Medrano RN  Ambulatory     3/18/2020, 15:24

## 2020-03-18 NOTE — OUTREACH NOTE
Care Coordination Note    RN-ACM outreach to PCP [facility] staff.  ACM spoke with staff and relayed caregiver request.  Staff will ask Dr. Griggs's nurse to return call to RN-ACM. This note being forwarded to PCP/attendant for information purposes.     Patient's daughter requested assistance in obtaining a hospital bed for patient use.  Caregiver requested automatic bed if possible and has no preference as to DME vendor. DME contact: Jaki Davila, [phone].  Delivery address: [address] [place] [address].  Patient insurance noted as [employer]/Federal and Medicare secondary.  Pre-approval questions/comments to be addressed with Jaki.      Abi Medrano RN  Ambulatory     3/18/2020, 14:34

## 2020-03-18 NOTE — TELEPHONE ENCOUNTER
Abi with ambulatory  with [employer] called and needs verbal orders for patient.  Please advise.  Abi's phone number is [phone]

## 2020-03-18 NOTE — OUTREACH NOTE
Patient Outreach Note    RN-ACM outreach to patient caregiver. (Yael)  Patient had ED visit at [facility] on 03/15/2020.  Patient presented with c/o altered mental status/abnormal behavior.  Patient chart notes indicate advanced Alzheimers.  Patient was evaluated and discharged to home without acute admission.  Discharge instructions reviewed with patient's daughter/caregiver.  Daughter reported patient is currently back to her normal baseline.      Daughter requested assistance in obtain a hospital bed for patient use.  Education provided, ACM will contact provider and relay request.  Caregiver request automatic bed if possible and has no preference as to DME vendor.  DME contact: Jaki Davila, [phone].  Delivery address: [address] [place] [address].  Patient insurance noted as [employer]/Federal and Medicare secondary.  Pre-approval questions/comments to be addressed with Jaki.               Abi Medrano RN  Ambulatory     3/18/2020, 14:19

## 2020-03-18 NOTE — OUTREACH NOTE
Patient Outreach Note    RN-ACM outreach to Jaki, patient caregiver/POA.  Information about scheduling PCP appointment for evaluation of DME needs relayed to caregiver.  Jaki was understanding and appreciative of concern.  Appointment to be scheduled at a later date due to Covid-19 concerns.      Abi Medrano RN  Ambulatory     3/18/2020, 15:26

## 2020-03-24 ENCOUNTER — EPISODE CHANGES (OUTPATIENT)
Dept: CASE MANAGEMENT | Facility: OTHER | Age: 85
End: 2020-03-24

## 2020-04-09 DIAGNOSIS — K21.00 GASTROESOPHAGEAL REFLUX DISEASE WITH ESOPHAGITIS: ICD-10-CM

## 2020-04-09 RX ORDER — PANTOPRAZOLE SODIUM 40 MG/1
TABLET, DELAYED RELEASE ORAL
Qty: 30 TABLET | Refills: 1 | OUTPATIENT
Start: 2020-04-09

## 2020-04-09 RX ORDER — FUROSEMIDE 40 MG/1
TABLET ORAL
Qty: 60 TABLET | Refills: 4 | Status: SHIPPED | OUTPATIENT
Start: 2020-04-09 | End: 2020-06-22 | Stop reason: SDUPTHER

## 2020-04-09 RX ORDER — ESCITALOPRAM OXALATE 20 MG/1
TABLET ORAL
Qty: 90 TABLET | Refills: 2 | Status: SHIPPED | OUTPATIENT
Start: 2020-04-09

## 2020-04-09 RX ORDER — CARVEDILOL 25 MG/1
TABLET ORAL
Qty: 180 TABLET | Refills: 2 | Status: SHIPPED | OUTPATIENT
Start: 2020-04-09 | End: 2021-01-06

## 2020-04-09 NOTE — TELEPHONE ENCOUNTER
The patient is requesting a refill of her Pantoprazole. She has not been seen since February 2019 and needs appointment for refills. She can schedule video visit for refills at this time if she wishes.

## 2020-04-21 ENCOUNTER — TELEMEDICINE (OUTPATIENT)
Dept: INTERNAL MEDICINE | Facility: CLINIC | Age: 85
End: 2020-04-21

## 2020-04-21 DIAGNOSIS — I10 BENIGN ESSENTIAL HYPERTENSION: Primary | ICD-10-CM

## 2020-04-21 DIAGNOSIS — E03.8 ADULT ONSET HYPOTHYROIDISM: ICD-10-CM

## 2020-04-21 DIAGNOSIS — E11.9 CONTROLLED TYPE 2 DIABETES MELLITUS WITHOUT COMPLICATION, WITHOUT LONG-TERM CURRENT USE OF INSULIN (HCC): ICD-10-CM

## 2020-04-21 DIAGNOSIS — G30.1 LATE ONSET ALZHEIMER'S DISEASE WITH BEHAVIORAL DISTURBANCE (HCC): ICD-10-CM

## 2020-04-21 DIAGNOSIS — F02.818 LATE ONSET ALZHEIMER'S DISEASE WITH BEHAVIORAL DISTURBANCE (HCC): ICD-10-CM

## 2020-04-21 DIAGNOSIS — K21.00 GASTROESOPHAGEAL REFLUX DISEASE WITH ESOPHAGITIS: ICD-10-CM

## 2020-04-21 DIAGNOSIS — E78.2 MIXED HYPERLIPIDEMIA: ICD-10-CM

## 2020-04-21 PROCEDURE — 99214 OFFICE O/P EST MOD 30 MIN: CPT | Performed by: INTERNAL MEDICINE

## 2020-04-21 RX ORDER — PANTOPRAZOLE SODIUM 40 MG/1
TABLET, DELAYED RELEASE ORAL
Qty: 30 TABLET | Refills: 5 | Status: SHIPPED | OUTPATIENT
Start: 2020-04-21

## 2020-04-21 NOTE — PROGRESS NOTES
Subjective   Valerie Davila is a 84 y.o. female.     Chief Complaint   Patient presents with   • Follow-up     Doing well as expected, family requesting a hospital bed to help with getting up and down   • Hypertension   • Hyperlipidemia   • Diabetes   • Hypothyroidism   • Memory Loss   • Heartburn       History of Present Illness   .You have chosen to receive care through a telehealth visit.  Do you consent to use a video/audio connection for your medical care today? Yes    PC/KW-Family had updated via [employer] 4/20/2020    HPI: Video conference with patient today and her daughter Jaki who is the historian for today's visit, patient is following up on the blood pressure  The patient is taking the blood pressure medications as prescribed and has had no side effects. The patient is also here to follow up on the cholesterol and sugar is trying to follow a diet. The patient is also following up on thyroid and due to get lab work done .  The patient complains of GERD and also needs refills on medications .  Patient is also following up on Alzheimer's dementia and she is taking her medications per [facility]  Hyperlipidemia   Pertinent negatives include no chest pain or shortness of breath.   Hypertension   Pertinent negatives include no chest pain, palpitations or shortness of breath.    During today's visit, I reviewed the documented allergies, medications, chief complaint, and pertinent vitals.  I have confirmed with the patient that there have been no changes since this information was discussed with my clinical team member.    The following portions of the patient's history were reviewed and updated as appropriate: allergies, current medications, past family history, past medical history, past social history, past surgical history and problem list.    Review of Systems   Constitutional: Negative for appetite change, fatigue and fever.   HENT: Negative for congestion, ear discharge, ear pain, sinus pressure and  sore throat.    Eyes: Negative for pain and discharge.   Respiratory: Negative for cough, chest tightness, shortness of breath and wheezing.    Cardiovascular: Negative for chest pain, palpitations and leg swelling.   Gastrointestinal: Negative for abdominal pain, blood in stool, constipation, diarrhea and nausea.   Endocrine: Negative for cold intolerance and heat intolerance.   Genitourinary: Negative for dysuria, flank pain and frequency.   Musculoskeletal: Negative for back pain and joint swelling.   Skin: Negative for color change.   Allergic/Immunologic: Negative for environmental allergies and food allergies.   Neurological: Negative for dizziness, weakness, numbness and headaches.   Hematological: Negative for adenopathy. Does not bruise/bleed easily.   Psychiatric/Behavioral: Positive for decreased concentration. Negative for behavioral problems and dysphoric mood. The patient is not nervous/anxious.          Current Outpatient Medications:   •  albuterol (PROVENTIL HFA;VENTOLIN HFA) 108 (90 Base) MCG/ACT inhaler, Inhale 2 puffs Every 4 (Four) Hours As Needed for Wheezing., Disp: 1 inhaler, Rfl: 0  •  carvedilol (COREG) 25 MG tablet, TAKE ONE TABLET BY MOUTH TWICE A DAY WITH MEALS, Disp: 180 tablet, Rfl: 2  •  escitalopram (LEXAPRO) 20 MG tablet, TAKE ONE TABLET BY MOUTH DAILY, Disp: 90 tablet, Rfl: 2  •  furosemide (LASIX) 40 MG tablet, TAKE ONE TABLET BY MOUTH TWICE A DAY, Disp: 60 tablet, Rfl: 4  •  hydrALAZINE (APRESOLINE) 50 MG tablet, TAKE ONE TABLET BY MOUTH THREE TIMES A DAY, Disp: 90 tablet, Rfl: 4  •  lamoTRIgine (LaMICtal) 25 MG tablet, Take 25 mg by mouth Daily., Disp: , Rfl:   •  levothyroxine (SYNTHROID, LEVOTHROID) 88 MCG tablet, Take 1 tablet by mouth Daily., Disp: 90 tablet, Rfl: 3  •  memantine (NAMENDA) 10 MG tablet, Take 10 mg by mouth 2 (Two) Times a Day., Disp: , Rfl:   •  Misc. Devices (COMMODE BEDSIDE) misc, Use as directed, Disp: 1 each, Rfl: 0  •  olmesartan (BENICAR) 40 MG tablet,  TAKE ONE TABLET BY MOUTH DAILY, Disp: 90 tablet, Rfl: 2  •  pantoprazole (PROTONIX) 40 MG EC tablet, TAKE ONE TABLET 30 MINUTES BEFORE BREAKFAST DAILY, Disp: 30 tablet, Rfl: 5  •  potassium chloride (KAYCIEL) 20 MEQ/15ML (10%) solution, Take 15 mL by mouth Daily. (Patient taking differently: Take 10 mEq by mouth Daily.), Disp: 473 mL, Rfl: 11  •  pravastatin (PRAVACHOL) 40 MG tablet, Take 1 tablet by mouth Daily., Disp: 90 tablet, Rfl: 3  •  QUEtiapine (SEROquel) 25 MG tablet, Take 25 mg by mouth Every Night., Disp: , Rfl:   •  timolol (BETIMOL) 0.5 % ophthalmic solution, Administer 1 drop to both eyes Daily., Disp: , Rfl:   •  travoprost, TA free, (TRAVATAN Z) 0.004 % solution ophthalmic solution, Administer 1 drop to both eyes Daily., Disp: , Rfl:   •  traZODone (DESYREL) 100 MG tablet, Take 1 tablet by mouth Every Night., Disp: 90 tablet, Rfl: 3    Objective     not currently breastfeeding.    Physical Exam  All vitals recorded within this visit are reported by the patient.  General appearance: Normocephalic and nontraumatic , sitting upright in her recliner  HEENT: Appears benign, hearing appears intact  Neck: Appears supple  Respiratory: Effort appears normal  Musculoskeletal: Moving all limbs  CNS: No gross motor or sensory deficits  Psychiatry: Alert   Memory deficits present , answers simple questions      Results for orders placed or performed during the hospital encounter of 03/15/20   Influenza Antigen, Rapid - Swab, Nasopharynx   Result Value Ref Range    Influenza A Ag, EIA Negative Negative    Influenza B Ag, EIA Negative Negative   Comprehensive Metabolic Panel   Result Value Ref Range    Glucose 165 (H) 65 - 99 mg/dL    BUN 14 8 - 23 mg/dL    Creatinine 1.12 (H) 0.57 - 1.00 mg/dL    Sodium 134 (L) 136 - 145 mmol/L    Potassium 4.7 3.5 - 5.2 mmol/L    Chloride 96 (L) 98 - 107 mmol/L    CO2 27.4 22.0 - 29.0 mmol/L    Calcium 9.1 8.6 - 10.5 mg/dL    Total Protein 7.0 6.0 - 8.5 g/dL    Albumin 3.70 3.50  - 5.20 g/dL    ALT (SGPT) 13 1 - 33 U/L    AST (SGOT) 23 1 - 32 U/L    Alkaline Phosphatase 56 39 - 117 U/L    Total Bilirubin 0.6 0.2 - 1.2 mg/dL    eGFR  African Amer 56 (L) >60 mL/min/1.73    Globulin 3.3 gm/dL    A/G Ratio 1.1 g/dL    BUN/Creatinine Ratio 12.5 7.0 - 25.0    Anion Gap 10.6 5.0 - 15.0 mmol/L   Urinalysis With Culture If Indicated - Urine, Clean Catch   Result Value Ref Range    Color, UA Yellow Yellow, Straw    Appearance, UA Clear Clear    pH, UA 6.5 5.0 - 8.0    Specific Gravity, UA 1.007 1.005 - 1.030    Glucose, UA Negative Negative    Ketones, UA Negative Negative    Bilirubin, UA Negative Negative    Blood, UA Negative Negative    Protein, UA Negative Negative    Leuk Esterase, UA Negative Negative    Nitrite, UA Negative Negative    Urobilinogen, UA 0.2 E.U./dL 0.2 - 1.0 E.U./dL   BNP   Result Value Ref Range    proBNP 495.6 5.0-1,800.0 pg/mL   Troponin   Result Value Ref Range    Troponin T <0.010 0.000 - 0.030 ng/mL   CBC Auto Differential   Result Value Ref Range    WBC 5.66 3.40 - 10.80 10*3/mm3    RBC 4.29 3.77 - 5.28 10*6/mm3    Hemoglobin 13.3 12.0 - 15.9 g/dL    Hematocrit 40.2 34.0 - 46.6 %    MCV 93.7 79.0 - 97.0 fL    MCH 31.0 26.6 - 33.0 pg    MCHC 33.1 31.5 - 35.7 g/dL    RDW 11.9 (L) 12.3 - 15.4 %    RDW-SD 41.5 37.0 - 54.0 fl    MPV 12.0 6.0 - 12.0 fL    Platelets 123 (L) 140 - 450 10*3/mm3    Neutrophil % 59.7 42.7 - 76.0 %    Lymphocyte % 28.8 19.6 - 45.3 %    Monocyte % 7.6 5.0 - 12.0 %    Eosinophil % 2.8 0.3 - 6.2 %    Basophil % 0.7 0.0 - 1.5 %    Immature Grans % 0.4 0.0 - 0.5 %    Neutrophils, Absolute 3.38 1.70 - 7.00 10*3/mm3    Lymphocytes, Absolute 1.63 0.70 - 3.10 10*3/mm3    Monocytes, Absolute 0.43 0.10 - 0.90 10*3/mm3    Eosinophils, Absolute 0.16 0.00 - 0.40 10*3/mm3    Basophils, Absolute 0.04 0.00 - 0.20 10*3/mm3    Immature Grans, Absolute 0.02 0.00 - 0.05 10*3/mm3    nRBC 0.0 0.0 - 0.2 /100 WBC   Blood Gas, Arterial With Co-Ox   Result Value Ref Range     Site Left Brachial     Samuel's Test N/A     pH, Arterial 7.433 7.300 - 7.500 pH units    pCO2, Arterial 44.1 35.0 - 45.0 mm Hg    pO2, Arterial 80.4 75.0 - 100.0 mm Hg    HCO3, Arterial 29.5 (H) 22.0 - 28.0 mmol/L    Base Excess, Arterial 4.5 (H) 0.0 - 2.0 mmol/L    O2 Saturation, Arterial 96.9 94.0 - 100.0 %    Hemoglobin, Blood Gas 13.2 12 - 18 g/dL    Hematocrit, Blood Gas 40.4 %    Oxyhemoglobin 95.4 94 - 99 %    Methemoglobin 0.80 0.00 - 1.50 %    Carboxyhemoglobin 0.7 0 - 2 %    A-a Gradiant 16.5 mmHg    Barometric Pressure for Blood Gas 745 mmHg    Modality Room Air     FIO2 21 %    Ventilator Mode NA     Note      pH, Temp Corrected      pCO2, Temperature Corrected      pO2, Temperature Corrected     POC Glucose Once   Result Value Ref Range    Glucose 145 (H) 70 - 130 mg/dL   Light Blue Top   Result Value Ref Range    Extra Tube hold for add-on    Green Top (Gel)   Result Value Ref Range    Extra Tube Hold for add-ons.    Lavender Top   Result Value Ref Range    Extra Tube hold for add-on    Gold Top - SST   Result Value Ref Range    Extra Tube Hold for add-ons.          Assessment/Plan   Valerie was seen today for follow-up, hypertension, hyperlipidemia, diabetes, hypothyroidism, memory loss and heartburn.    Diagnoses and all orders for this visit:    Benign essential hypertension    Mixed hyperlipidemia  -     CBC & Differential  -     Comprehensive Metabolic Panel  -     Lipid Panel    Controlled type 2 diabetes mellitus without complication, without long-term current use of insulin (CMS/[facility])  -     Hemoglobin A1c    Adult onset hypothyroidism  -     TSH    Gastroesophageal reflux disease with esophagitis  -     pantoprazole (PROTONIX) 40 MG EC tablet; TAKE ONE TABLET 30 MINUTES BEFORE BREAKFAST DAILY    Late onset Alzheimer's disease with behavioral disturbance (CMS/[facility])      Plan:  1.  Benign essential hypertension: Will continue current medication, low-sodium diet advised, Counseled to regularly check  BP at home with goal averaging <130/80.   2.mixed hyperlipidemia: will obtain   fasting CMP and lipid panel.  Diet and exercise counseled,  Will continue current medications  3. Diabetes  Mellitus  : will obtain   fasting CMP  and hba1c  , diet and exercise counseled , Will continue current medications  4. hypothyroidism: will obtain tsh , and continue levothyroxine  5. gerd : Refill pantoprazole  6.  Alzheimer's dementia: We will continue current medication    This was an audio and video enabled telemedicine encounter.         Katarzyna Griggs MD.

## 2020-05-11 RX ORDER — LEVOTHYROXINE SODIUM 88 UG/1
TABLET ORAL
Qty: 30 TABLET | Refills: 2 | Status: SHIPPED | OUTPATIENT
Start: 2020-05-11 | End: 2020-10-08

## 2020-06-22 ENCOUNTER — OFFICE VISIT (OUTPATIENT)
Dept: INTERNAL MEDICINE | Facility: CLINIC | Age: 85
End: 2020-06-22

## 2020-06-22 VITALS
DIASTOLIC BLOOD PRESSURE: 66 MMHG | BODY MASS INDEX: 34.23 KG/M2 | TEMPERATURE: 96.9 F | HEIGHT: 66 IN | RESPIRATION RATE: 16 BRPM | HEART RATE: 55 BPM | SYSTOLIC BLOOD PRESSURE: 167 MMHG | OXYGEN SATURATION: 97 %

## 2020-06-22 DIAGNOSIS — R60.0 LOCALIZED EDEMA: ICD-10-CM

## 2020-06-22 DIAGNOSIS — E78.2 MIXED HYPERLIPIDEMIA: ICD-10-CM

## 2020-06-22 DIAGNOSIS — I10 BENIGN ESSENTIAL HYPERTENSION: Primary | ICD-10-CM

## 2020-06-22 DIAGNOSIS — F02.818 LATE ONSET ALZHEIMER'S DISEASE WITH BEHAVIORAL DISTURBANCE (HCC): ICD-10-CM

## 2020-06-22 DIAGNOSIS — E03.8 ADULT ONSET HYPOTHYROIDISM: ICD-10-CM

## 2020-06-22 DIAGNOSIS — G30.1 LATE ONSET ALZHEIMER'S DISEASE WITH BEHAVIORAL DISTURBANCE (HCC): ICD-10-CM

## 2020-06-22 DIAGNOSIS — E11.9 CONTROLLED TYPE 2 DIABETES MELLITUS WITHOUT COMPLICATION, WITHOUT LONG-TERM CURRENT USE OF INSULIN (HCC): ICD-10-CM

## 2020-06-22 DIAGNOSIS — N18.2 CHRONIC KIDNEY DISEASE, STAGE II (MILD): ICD-10-CM

## 2020-06-22 PROCEDURE — 99214 OFFICE O/P EST MOD 30 MIN: CPT | Performed by: INTERNAL MEDICINE

## 2020-06-22 RX ORDER — FUROSEMIDE 40 MG/1
40 TABLET ORAL 2 TIMES DAILY
Qty: 60 TABLET | Refills: 5 | Status: SHIPPED | OUTPATIENT
Start: 2020-06-22 | End: 2021-03-08

## 2020-06-22 RX ORDER — OLMESARTAN MEDOXOMIL 40 MG/1
40 TABLET ORAL DAILY
Qty: 90 TABLET | Refills: 3 | Status: SHIPPED | OUTPATIENT
Start: 2020-06-22

## 2020-06-22 RX ORDER — HYDRALAZINE HYDROCHLORIDE 50 MG/1
50 TABLET, FILM COATED ORAL 3 TIMES DAILY
Qty: 90 TABLET | Refills: 5 | Status: SHIPPED | OUTPATIENT
Start: 2020-06-22 | End: 2020-09-08 | Stop reason: SDUPTHER

## 2020-06-22 RX ORDER — POTASSIUM CHLORIDE 20MEQ/15ML
20 LIQUID (ML) ORAL DAILY
Qty: 473 ML | Refills: 11 | Status: SHIPPED | OUTPATIENT
Start: 2020-06-22

## 2020-06-22 NOTE — PROGRESS NOTES
Subjective   Valerie Davila is a 84 y.o. female.     Chief Complaint   Patient presents with   • Hypertension   • Hyperlipidemia   • Diabetes   • Hypothyroidism   • Memory Loss   • Chronic Kidney Disease   • Leg Swelling       History of Present Illness   HPI: Patient is here to follow up on the blood pressure  The patient is taking the blood pressure medications as prescribed and has had no side effects. The patient is also here to follow up on the cholesterol and sugar is trying to follow a diet. The patient is also to follow-up on thyroid and CRI and due to get lab work done .  The patient also needs refills on medications .  Patient is also here to follow-up on her dementia, her daughter states that she has deteriorated further, she barely gets out of her recliner, and his speech is very impaired, today patient is only able to tell me her name, if I do ask a simple questions she has a garbled speech which does not make any sense, she is able to follow simple commands, history is from the daughter who is with her today, she follows up with [facility] neurology at the [facility] for dementia, daughter also states patient has been having leg swelling as she is sitting in her recliner for several hours, patient has 24-hour care givers at home  Hyperlipidemia   Pertinent negatives include no chest pain or shortness of breath.   Hypertension   Pertinent negatives include no chest pain, palpitations or shortness of breath.    The following portions of the patient's history were reviewed and updated as appropriate: allergies, current medications, past family history, past medical history, past social history, past surgical history and problem list.    Review of Systems   Constitutional: Negative for appetite change, fatigue and fever.   HENT: Negative for congestion, ear discharge, ear pain, sinus pressure and sore throat.    Eyes: Negative for pain and discharge.   Respiratory: Negative for cough,  chest tightness, shortness of breath and wheezing.    Cardiovascular: Positive for leg swelling. Negative for chest pain and palpitations.   Gastrointestinal: Negative for abdominal pain, blood in stool, constipation, diarrhea and nausea.   Endocrine: Negative for cold intolerance and heat intolerance.   Genitourinary: Negative for dysuria, flank pain and frequency.   Musculoskeletal: Negative for back pain and joint swelling.   Skin: Negative for color change.   Allergic/Immunologic: Negative for environmental allergies and food allergies.   Neurological: Negative for dizziness, weakness, numbness and headaches.   Hematological: Negative for adenopathy. Does not bruise/bleed easily.   Psychiatric/Behavioral: Positive for decreased concentration. Negative for behavioral problems and dysphoric mood. The patient is not nervous/anxious.          Current Outpatient Medications:   •  albuterol (PROVENTIL HFA;VENTOLIN HFA) 108 (90 Base) MCG/ACT inhaler, Inhale 2 puffs Every 4 (Four) Hours As Needed for Wheezing., Disp: 1 inhaler, Rfl: 0  •  carvedilol (COREG) 25 MG tablet, TAKE ONE TABLET BY MOUTH TWICE A DAY WITH MEALS, Disp: 180 tablet, Rfl: 2  •  escitalopram (LEXAPRO) 20 MG tablet, TAKE ONE TABLET BY MOUTH DAILY, Disp: 90 tablet, Rfl: 2  •  furosemide (LASIX) 40 MG tablet, Take 1 tablet by mouth 2 (Two) Times a Day., Disp: 60 tablet, Rfl: 5  •  hydrALAZINE (APRESOLINE) 50 MG tablet, Take 1 tablet by mouth 3 (Three) Times a Day., Disp: 90 tablet, Rfl: 5  •  lamoTRIgine (LaMICtal) 25 MG tablet, Take 25 mg by mouth Daily., Disp: , Rfl:   •  levothyroxine (SYNTHROID, LEVOTHROID) 88 MCG tablet, TAKE ONE TABLET BY MOUTH DAILY, Disp: 30 tablet, Rfl: 2  •  memantine (NAMENDA) 10 MG tablet, Take 10 mg by mouth 2 (Two) Times a Day., Disp: , Rfl:   •  Misc. Devices (COMMODE BEDSIDE) misc, Use as directed, Disp: 1 each, Rfl: 0  •  olmesartan (BENICAR) 40 MG tablet, Take 1 tablet by mouth Daily., Disp: 90 tablet, Rfl: 3  •   "pantoprazole (PROTONIX) 40 MG EC tablet, TAKE ONE TABLET 30 MINUTES BEFORE BREAKFAST DAILY, Disp: 30 tablet, Rfl: 5  •  potassium chloride (KAYCIEL) 20 MEQ/15ML (10%) solution, Take 15 mL by mouth Daily., Disp: 473 mL, Rfl: 11  •  pravastatin (PRAVACHOL) 40 MG tablet, Take 1 tablet by mouth Daily., Disp: 90 tablet, Rfl: 3  •  QUEtiapine (SEROquel) 25 MG tablet, Take 25 mg by mouth Every Night., Disp: , Rfl:   •  timolol (BETIMOL) 0.5 % ophthalmic solution, Administer 1 drop to both eyes Daily., Disp: , Rfl:   •  travoprost, TA free, (TRAVATAN Z) 0.004 % solution ophthalmic solution, Administer 1 drop to both eyes Daily., Disp: , Rfl:   •  traZODone (DESYREL) 100 MG tablet, Take 1 tablet by mouth Every Night., Disp: 90 tablet, Rfl: 3    Objective     Blood pressure 167/66, pulse 55, temperature 96.9 °F (36.1 °C), resp. rate 16, height 167.6 cm (65.98\"), SpO2 97 %, not currently breastfeeding.    Physical Exam   Constitutional: She appears well-developed and well-nourished. No distress.   HENT:   Head: Normocephalic and atraumatic.   Right Ear: External ear normal.   Left Ear: External ear normal.   Nose: Nose normal.   Mouth/Throat: Oropharynx is clear and moist.   Eyes: Pupils are equal, round, and reactive to light. Conjunctivae and EOM are normal.   Neck: Neck supple. No thyromegaly present.   Cardiovascular: Normal rate, regular rhythm and normal heart sounds.   Pulmonary/Chest: Effort normal and breath sounds normal. No respiratory distress.   Abdominal: Soft. Bowel sounds are normal. She exhibits no distension. There is no tenderness. There is no rebound.   Musculoskeletal: Normal range of motion. She exhibits edema.   Lymphadenopathy:     She has no cervical adenopathy.   Neurological: She is alert.   No gross motor or sensory deficits   Skin: Skin is warm. She is not diaphoretic.   Psychiatric: She has a normal mood and affect.   Nursing note and vitals reviewed.    Patient's Body mass index is 34.23 kg/m².  "     Results for orders placed or performed during the hospital encounter of 03/15/20   Influenza Antigen, Rapid - Swab, Nasopharynx   Result Value Ref Range    Influenza A Ag, EIA Negative Negative    Influenza B Ag, EIA Negative Negative   Comprehensive Metabolic Panel   Result Value Ref Range    Glucose 165 (H) 65 - 99 mg/dL    BUN 14 8 - 23 mg/dL    Creatinine 1.12 (H) 0.57 - 1.00 mg/dL    Sodium 134 (L) 136 - 145 mmol/L    Potassium 4.7 3.5 - 5.2 mmol/L    Chloride 96 (L) 98 - 107 mmol/L    CO2 27.4 22.0 - 29.0 mmol/L    Calcium 9.1 8.6 - 10.5 mg/dL    Total Protein 7.0 6.0 - 8.5 g/dL    Albumin 3.70 3.50 - 5.20 g/dL    ALT (SGPT) 13 1 - 33 U/L    AST (SGOT) 23 1 - 32 U/L    Alkaline Phosphatase 56 39 - 117 U/L    Total Bilirubin 0.6 0.2 - 1.2 mg/dL    eGFR  African Amer 56 (L) >60 mL/min/1.73    Globulin 3.3 gm/dL    A/G Ratio 1.1 g/dL    BUN/Creatinine Ratio 12.5 7.0 - 25.0    Anion Gap 10.6 5.0 - 15.0 mmol/L   Urinalysis With Culture If Indicated - Urine, Clean Catch   Result Value Ref Range    Color, UA Yellow Yellow, Straw    Appearance, UA Clear Clear    pH, UA 6.5 5.0 - 8.0    Specific Gravity, UA 1.007 1.005 - 1.030    Glucose, UA Negative Negative    Ketones, UA Negative Negative    Bilirubin, UA Negative Negative    Blood, UA Negative Negative    Protein, UA Negative Negative    Leuk Esterase, UA Negative Negative    Nitrite, UA Negative Negative    Urobilinogen, UA 0.2 E.U./dL 0.2 - 1.0 E.U./dL   BNP   Result Value Ref Range    proBNP 495.6 5.0-1,800.0 pg/mL   Troponin   Result Value Ref Range    Troponin T <0.010 0.000 - 0.030 ng/mL   CBC Auto Differential   Result Value Ref Range    WBC 5.66 3.40 - 10.80 10*3/mm3    RBC 4.29 3.77 - 5.28 10*6/mm3    Hemoglobin 13.3 12.0 - 15.9 g/dL    Hematocrit 40.2 34.0 - 46.6 %    MCV 93.7 79.0 - 97.0 fL    MCH 31.0 26.6 - 33.0 pg    MCHC 33.1 31.5 - 35.7 g/dL    RDW 11.9 (L) 12.3 - 15.4 %    RDW-SD 41.5 37.0 - 54.0 fl    MPV 12.0 6.0 - 12.0 fL    Platelets 123  (L) 140 - 450 10*3/mm3    Neutrophil % 59.7 42.7 - 76.0 %    Lymphocyte % 28.8 19.6 - 45.3 %    Monocyte % 7.6 5.0 - 12.0 %    Eosinophil % 2.8 0.3 - 6.2 %    Basophil % 0.7 0.0 - 1.5 %    Immature Grans % 0.4 0.0 - 0.5 %    Neutrophils, Absolute 3.38 1.70 - 7.00 10*3/mm3    Lymphocytes, Absolute 1.63 0.70 - 3.10 10*3/mm3    Monocytes, Absolute 0.43 0.10 - 0.90 10*3/mm3    Eosinophils, Absolute 0.16 0.00 - 0.40 10*3/mm3    Basophils, Absolute 0.04 0.00 - 0.20 10*3/mm3    Immature Grans, Absolute 0.02 0.00 - 0.05 10*3/mm3    nRBC 0.0 0.0 - 0.2 /100 WBC   Blood Gas, Arterial With Co-Ox   Result Value Ref Range    Site Left Brachial     Samuel's Test N/A     pH, Arterial 7.433 7.300 - 7.500 pH units    pCO2, Arterial 44.1 35.0 - 45.0 mm Hg    pO2, Arterial 80.4 75.0 - 100.0 mm Hg    HCO3, Arterial 29.5 (H) 22.0 - 28.0 mmol/L    Base Excess, Arterial 4.5 (H) 0.0 - 2.0 mmol/L    O2 Saturation, Arterial 96.9 94.0 - 100.0 %    Hemoglobin, Blood Gas 13.2 12 - 18 g/dL    Hematocrit, Blood Gas 40.4 %    Oxyhemoglobin 95.4 94 - 99 %    Methemoglobin 0.80 0.00 - 1.50 %    Carboxyhemoglobin 0.7 0 - 2 %    A-a Gradiant 16.5 mmHg    Barometric Pressure for Blood Gas 745 mmHg    Modality Room Air     FIO2 21 %    Ventilator Mode NA     Note      pH, Temp Corrected      pCO2, Temperature Corrected      pO2, Temperature Corrected     POC Glucose Once   Result Value Ref Range    Glucose 145 (H) 70 - 130 mg/dL   Light Blue Top   Result Value Ref Range    Extra Tube hold for add-on    Green Top (Gel)   Result Value Ref Range    Extra Tube Hold for add-ons.    Lavender Top   Result Value Ref Range    Extra Tube hold for add-on    Gold Top - SST   Result Value Ref Range    Extra Tube Hold for add-ons.          Assessment/Plan   Valerie was seen today for hypertension, hyperlipidemia, diabetes, hypothyroidism, memory loss, chronic kidney disease and leg swelling.    Diagnoses and all orders for this visit:    Benign essential  hypertension    Mixed hyperlipidemia  -     CBC & Differential  -     Comprehensive Metabolic Panel  -     Lipid Panel    Controlled type 2 diabetes mellitus without complication, without long-term current use of insulin (CMS/HCC)  -     Hemoglobin A1c    Adult onset hypothyroidism  -     TSH    Localized edema    Chronic kidney disease, stage II (mild)    Late onset Alzheimer's disease with behavioral disturbance (CMS/HCC)    Other orders  -     furosemide (LASIX) 40 MG tablet; Take 1 tablet by mouth 2 (Two) Times a Day.  -     olmesartan (BENICAR) 40 MG tablet; Take 1 tablet by mouth Daily.  -     hydrALAZINE (APRESOLINE) 50 MG tablet; Take 1 tablet by mouth 3 (Three) Times a Day.  -     potassium chloride (KAYCIEL) 20 MEQ/15ML (10%) solution; Take 15 mL by mouth Daily.        Plan:  1.  Benign essential hypertension: Will continue current medication, low-sodium diet advised, Counseled to regularly check BP at home with goal averaging <130/80.   2.mixed hyperlipidemia: will obtain   fasting CMP and lipid panel.  Diet and exercise counseled,  Will continue current medications  3. Diabetes  Mellitus  : will obtain   fasting CMP  and hba1c  , diet and exercise counseled , Will continue current medications  4. hypothyroidism: will obtain tsh , and continue levothyroxine  5. Cri stage 2 : labs reviewed , will monitor   6. Edema: on diuretic  7. alzheimers dementia with behavioral changes :  On medications per [facility] neurology, will obtain labs         Katarzyna Griggs MD

## 2020-09-04 ENCOUNTER — PATIENT OUTREACH (OUTPATIENT)
Dept: CASE MANAGEMENT | Facility: OTHER | Age: 85
End: 2020-09-04

## 2020-09-04 DIAGNOSIS — F02.818 LATE ONSET ALZHEIMER'S DISEASE WITH BEHAVIORAL DISTURBANCE (HCC): ICD-10-CM

## 2020-09-04 DIAGNOSIS — G30.1 LATE ONSET ALZHEIMER'S DISEASE WITH BEHAVIORAL DISTURBANCE (HCC): ICD-10-CM

## 2020-09-04 DIAGNOSIS — R26.9 GAIT DISTURBANCE: ICD-10-CM

## 2020-09-04 DIAGNOSIS — I10 BENIGN ESSENTIAL HYPERTENSION: Primary | ICD-10-CM

## 2020-09-04 NOTE — OUTREACH NOTE
Patient Outreach Note    DaughterJaki, who lives with patient, called [facility] requesting Home Health Nursing and P.T. To come to the home.  She stated patient has had a decline in her mental and physical abilities; she can barely walk; takes 2 people to get her into a car seat so bringing her into the doctor's office is harder now.  Daughter stated everything is slowing down with patient; she is having to feed her.  Daughter stated that she never got the lab-work drawn that Dr. Spence ordered from the last PCP appt (6-22-20); said the lab tried to get the blood drawn, but patient was too dehydrated and they could not get any blood.  Daughter stated patient's condition is too weak to bring her to the lab, and asked if the  Nurse could draw the lab work at this time.  Informed daughter that I will relay this information to Dr. Griggs and request Home Health Nurse and PT evaluations, including labwork that Dr. Griggs wanted.    This note will be routed to PCP.    Marium Davis RN  Ambulatory     9/4/2020, 09:49

## 2020-09-08 ENCOUNTER — TELEMEDICINE (OUTPATIENT)
Dept: INTERNAL MEDICINE | Facility: CLINIC | Age: 85
End: 2020-09-08

## 2020-09-08 DIAGNOSIS — F02.818 LATE ONSET ALZHEIMER'S DISEASE WITH BEHAVIORAL DISTURBANCE (HCC): ICD-10-CM

## 2020-09-08 DIAGNOSIS — G47.10 EXCESSIVE SLEEPINESS: ICD-10-CM

## 2020-09-08 DIAGNOSIS — E11.9 CONTROLLED TYPE 2 DIABETES MELLITUS WITHOUT COMPLICATION, WITHOUT LONG-TERM CURRENT USE OF INSULIN (HCC): ICD-10-CM

## 2020-09-08 DIAGNOSIS — I10 BENIGN ESSENTIAL HYPERTENSION: Primary | ICD-10-CM

## 2020-09-08 DIAGNOSIS — G30.1 LATE ONSET ALZHEIMER'S DISEASE WITH BEHAVIORAL DISTURBANCE (HCC): ICD-10-CM

## 2020-09-08 PROCEDURE — 99214 OFFICE O/P EST MOD 30 MIN: CPT | Performed by: INTERNAL MEDICINE

## 2020-09-08 RX ORDER — HYDRALAZINE HYDROCHLORIDE 50 MG/1
50 TABLET, FILM COATED ORAL 2 TIMES DAILY
Qty: 60 TABLET | Refills: 5
Start: 2020-09-08 | End: 2020-12-07

## 2020-09-08 NOTE — PROGRESS NOTES
Subjective   Valerie Davila is a 84 y.o. female.     You have chosen to receive care through a telehealth visit.  Do you consent to use a video/audio connection for your medical care today? Yes  Active parties involved in today's video visit  Katarzyna Knox CMA  Valerie Davila Patient  Jaki Davila Daughter       Chief Complaint   Patient presents with   • Hypertension   • Diabetes   • Memory Loss     hpi : Video visit with patient today, history is from the patient's daughter Jaki who states that patient has been having issues where she is sleeping a lot and is getting more listless and confused and has been falling down as the unable to move her from the bed to the chair, they are  buying a lift and wheelchair for her  , I  have advised them to also think about skilled nursing facility, daughter would also like the patient to have home health care and this has been ordered with physical therapy at home, today patient is more alert and she is able to say hello to me, patient is also following up on blood pressure and sugar and due for lab work she is also following up on dementia and is currently taking medications    During today's visit, I reviewed the documented allergies, medications, chief complaint, and pertinent vitals.  I have confirmed with the patient that there have been no changes since this information was discussed with my clinical team member.      The following portions of the patient's history were reviewed and updated as appropriate: allergies, current medications, past family history, past medical history, past social history, past surgical history and problem list.    Review of Systems  Afebrile  Per daughter    Objective   Physical Exam  bp 128/88, 187/69, 135/77   All vitals recorded within this visit are reported by the patient's daughter.  General appearance: Normocephalic and nontraumatic  HEENT: External inspection of eyes, ears and nose appears benign, able to  hear me  Neck: Neck appears supple, trachea in midline, thyroid appears not enlarged  Respiratory: Respiratory effort appears normal  Musculoskeletal: Moving all limbs  Range of motion of visible joints appears within normal  CNS:  Awake and says hello, sitting in chair upright  Psychiatry:  awake    Assessment/Plan   Valerie was seen today for hypertension, diabetes and memory loss.    Diagnoses and all orders for this visit:    Benign essential hypertension    Controlled type 2 diabetes mellitus without complication, without long-term current use of insulin (CMS/HCC)    Late onset Alzheimer's disease with behavioral disturbance (CMS/HCC)    Excessive sleepiness    Other orders  -     hydrALAZINE (APRESOLINE) 50 MG tablet; Take 1 tablet by mouth 2 (two) times a day.      Plan:  1.  Benign essential hypertension: Will continue current medication, decrease hydralazine to twice daily dosage low-sodium diet advised, Counseled to regularly check BP at home with goal averaging <130/80.   2.  Diabetes  Mellitus  : will obtain   fasting CMP  and hba1c  , diet and exercise counseled , Will continue current medications  3.  Alzheimer's dementia: We will continue current medication  4.  Increased sleepiness: We will hold Seroquel, trazodone and Lamictal at this time and obtain labs, daughter has been advised that she can start Seroquel if patient gets agitated  This was a  video enabled telemedicine encounter.

## 2020-09-09 ENCOUNTER — PATIENT OUTREACH (OUTPATIENT)
Dept: CASE MANAGEMENT | Facility: OTHER | Age: 85
End: 2020-09-09

## 2020-09-09 ENCOUNTER — TELEPHONE (OUTPATIENT)
Dept: INTERNAL MEDICINE | Facility: CLINIC | Age: 85
End: 2020-09-09

## 2020-09-09 NOTE — OUTREACH NOTE
Patient Outreach Note    Received call from Jaki Davila, patient's daughter, stating she contacted Dr. Griggs's office to see about the Home Health referral, and was told the  Referral was put through on Friday, 9-4-20. Jaki said she has not heard from [facility] at this point, and was wondering about it.  Informed Jaki, that RN-ACM will contact [facility] and check on their schedule, and get back with her.  Jaki said to be sure the Home Health agency knows patient is not staying at the [address] [place] [address] address; her correct address at this time, is [address] [place], [place] [id number].    Marium Davis RN  Ambulatory     9/9/2020, 13:32

## 2020-09-09 NOTE — OUTREACH NOTE
Care Coordination Note    Called Dr. Griggs's office, left voice mail with RN-ACM contact information, included daughter's questions regarding Home Health, correct address of patient being [address] [place] [address] [place] [address]; request of lab-work to be drawn on first visit as Dr. Griggs had ordered previously. Relayed the information obtained that [facility] was not able to accept the HH Referral due to staffing.  Requested, if not already done, if they could send the HH Referral to another HH Agency.    Marium Davis RN  Ambulatory     9/9/2020, 14:15

## 2020-09-09 NOTE — OUTREACH NOTE
Patient Outreach Note    Called daughter, Jaki, back and informed her that [facility] did receive the HH Referral on Friday, 9/4, however, due to low staffing, they were unable to accept the referral at that time, and notified Dr. Griggs's office.  Informed Jaki, that RN-ACM will contact PCP office and confirm they received this information and can relay the HH Referral to another HH Agency.  Jaki voiced understanding and agreement.  She said to make sure the HH Agency knows what Lab-Work that Dr. Griggs wants drawn with first visit, and make sure they know the correct address for patient: [facility] [place] [address].  This note will be routed to PCP's Clinical Pool.    Marium Davis RN  Ambulatory     9/9/2020, 13:52

## 2020-09-09 NOTE — TELEPHONE ENCOUNTER
This is Marium Martin. I'm one of the nurse  outside of the hospital. My phone number is [phone] if you need me. I'm calling about Valerie Davila one of Dr. Griggs's patients. I think the referral was placed on Friday but the [facility] is not able to see her due to staffing so I was calling to see if we could send that somewhere else because the patient's daughter called me and said they hadn't heard anything. The patient is currently staying at [address] [place] [address] which is different the address in [facility]. So that's why I'm calling. Let me know if you have any questions, you are welcome to call me. Thank you.

## 2020-09-09 NOTE — OUTREACH NOTE
Care Coordination Note    Called [facility], spoke with Idalia.  Asked about patient's Home Health Referral.  She said the referral was received 9/4;  they did not have the staff to accept the referral at that time, and they let Dr. Spence's office know.    Marium Davis RN  Ambulatory     9/9/2020, 13:42

## 2020-09-10 ENCOUNTER — PATIENT OUTREACH (OUTPATIENT)
Dept: CASE MANAGEMENT | Facility: OTHER | Age: 85
End: 2020-09-10

## 2020-09-10 NOTE — OUTREACH NOTE
Care Coordination Note    Noted in Epic, Home Health Referral sent to [facility].  Called [facility], spoke with Claire.  Confirmed they have received the HH Referral; she said SOC date may have to be next week due to staffing.  Encouraged them to communicate with PCP office and notify patient's daughter of SOC date. Claire said they planned to.      Marium Davis RN  Ambulatory     9/10/2020, 12:58

## 2020-09-11 ENCOUNTER — PATIENT OUTREACH (OUTPATIENT)
Dept: CASE MANAGEMENT | Facility: OTHER | Age: 85
End: 2020-09-11

## 2020-09-11 NOTE — OUTREACH NOTE
Patient Outreach Note    Called patient's daughter, Jaki, and confirmed she has received a call from Home Health ([facility]), and they plan to start seeing patient tomorrow, Saturday, 9/12/20.  Daughter voiced appreciation for the follow up.    Marium Davis RN  Ambulatory     9/11/2020, 13:57

## 2020-09-15 ENCOUNTER — TELEMEDICINE (OUTPATIENT)
Dept: INTERNAL MEDICINE | Facility: CLINIC | Age: 85
End: 2020-09-15

## 2020-09-15 DIAGNOSIS — F02.818 LATE ONSET ALZHEIMER'S DISEASE WITH BEHAVIORAL DISTURBANCE (HCC): ICD-10-CM

## 2020-09-15 DIAGNOSIS — I10 BENIGN ESSENTIAL HYPERTENSION: Primary | ICD-10-CM

## 2020-09-15 DIAGNOSIS — G30.1 LATE ONSET ALZHEIMER'S DISEASE WITH BEHAVIORAL DISTURBANCE (HCC): ICD-10-CM

## 2020-09-15 PROCEDURE — 99214 OFFICE O/P EST MOD 30 MIN: CPT | Performed by: INTERNAL MEDICINE

## 2020-09-15 NOTE — PROGRESS NOTES
Subjective   Valerie Davila is a 84 y.o. female.     You have chosen to receive care through a telehealth visit.  Do you consent to use a video/audio connection for your medical care today? Yes  Active parties in today's video visit  Katarzyna Knox CMA  Valerie Davila Patient  Jaki Davila Daughter       Chief Complaint   Patient presents with   • Hypertension   • Alzheimer's Disease     HPI: Video visit with patient and her daughter Jaki today, history is from daughter  only   atient is following up on the blood pressure  The patient is taking the blood pressure medications as prescribed and has had no side effects. The patient is also following up on Alzheimer's dementia, per her daughter patient is getting more nonverbal and following commands, daughter also states that it is difficult for them to give the patient her medication as she keeps it in the mouth and declined to put it in applesauce, the also having difficulty moving the patient around and feeding her as she does not want to eat, she requested hospice or  palliative care referral at this time    During today's visit, I reviewed the documented allergies, medications, chief complaint, and pertinent vitals.  I have confirmed with the patient that there have been no changes since this information was discussed with my clinical team member.      The following portions of the patient's history were reviewed and updated as appropriate: allergies, current medications, past family history, past medical history, past social history, past surgical history and problem list.    Review of Systems  Not elicitable    Objective   Physical Exam  Patient is sitting up in her bed and getting video visit she is looking up into the cell phone and when I said hello she said hello and when I asked her how she is doing she says she is fine but after any further questions she is just been blabling and the words  did not make any sense , she is  responsive and awake but she closes her eyes and goes back to sleep, unable to have a follow simple command, daughter does state that patient is moving all limbs    Assessment/Plan   Valerie was seen today for hypertension and alzheimer's disease.    Diagnoses and all orders for this visit:    Benign essential hypertension    Late onset Alzheimer's disease with behavioral disturbance (CMS/HCC)  -     Ambulatory Referral to Palliative Care    Plan:  1.  Benign essential hypertension: Will continue current medication, low-sodium diet advised, Counseled to regularly check BP at home with goal averaging <130/80.   2.Alzheimer's dementia: Given her rapid decline since her last visit we will refer patient to palliative care for further evaluation, will continue home health care at this time and obtain labs    This was a video enabled telemedicine encounter.

## 2020-09-16 ENCOUNTER — PATIENT OUTREACH (OUTPATIENT)
Dept: CASE MANAGEMENT | Facility: OTHER | Age: 85
End: 2020-09-16

## 2020-09-16 ENCOUNTER — TELEPHONE (OUTPATIENT)
Dept: INTERNAL MEDICINE | Facility: CLINIC | Age: 85
End: 2020-09-16

## 2020-09-16 ENCOUNTER — EPISODE CHANGES (OUTPATIENT)
Dept: CASE MANAGEMENT | Facility: OTHER | Age: 85
End: 2020-09-16

## 2020-09-16 NOTE — TELEPHONE ENCOUNTER
"\"Hi this is [place] with [facility]. I was calling on a referral we received on Valerie Lola 1935. She is not appropriate for diagnosis of Alzheimer's. We are looking at doing palliative care. Just give us a call back if you have any questions [phone] is our main office. My no. is [phone]. Thank you.\"    "

## 2020-09-16 NOTE — OUTREACH NOTE
Patient Outreach Note    Called patient's daughter, Jaki, and confirmed that Home Health has begun services with patient, so far a Nurse and OT have been in.  Daughter voiced appreciation for the call.  Conversation brief, per daughter.  Confirmed daughter has RN-ACM contact number if needed.    Marium Davis RN  Ambulatory     9/16/2020, 14:11 EDT

## 2020-09-21 ENCOUNTER — LAB REQUISITION (OUTPATIENT)
Dept: LAB | Facility: HOSPITAL | Age: 85
End: 2020-09-21

## 2020-09-21 DIAGNOSIS — E11.22 TYPE 2 DIABETES MELLITUS WITH DIABETIC CHRONIC KIDNEY DISEASE (HCC): ICD-10-CM

## 2020-09-21 DIAGNOSIS — I12.9 HYPERTENSIVE CHRONIC KIDNEY DISEASE WITH STAGE 1 THROUGH STAGE 4 CHRONIC KIDNEY DISEASE, OR UNSPECIFIED CHRONIC KIDNEY DISEASE: ICD-10-CM

## 2020-09-21 LAB
ALBUMIN SERPL-MCNC: 3.4 G/DL (ref 3.5–5.2)
ALBUMIN/GLOB SERPL: 1.6 G/DL
ALP SERPL-CCNC: 46 U/L (ref 39–117)
ALT SERPL W P-5'-P-CCNC: 11 U/L (ref 1–33)
ANION GAP SERPL CALCULATED.3IONS-SCNC: 9.1 MMOL/L (ref 5–15)
AST SERPL-CCNC: 26 U/L (ref 1–32)
BASOPHILS # BLD AUTO: 0.04 10*3/MM3 (ref 0–0.2)
BASOPHILS NFR BLD AUTO: 0.7 % (ref 0–1.5)
BILIRUB SERPL-MCNC: 0.7 MG/DL (ref 0–1.2)
BUN SERPL-MCNC: 13 MG/DL (ref 8–23)
BUN/CREAT SERPL: 12.3 (ref 7–25)
CALCIUM SPEC-SCNC: 8.5 MG/DL (ref 8.6–10.5)
CHLORIDE SERPL-SCNC: 96 MMOL/L (ref 98–107)
CHOLEST SERPL-MCNC: 170 MG/DL (ref 0–200)
CO2 SERPL-SCNC: 28.9 MMOL/L (ref 22–29)
CREAT SERPL-MCNC: 1.06 MG/DL (ref 0.57–1)
DEPRECATED RDW RBC AUTO: 44.1 FL (ref 37–54)
EOSINOPHIL # BLD AUTO: 0.13 10*3/MM3 (ref 0–0.4)
EOSINOPHIL NFR BLD AUTO: 2.3 % (ref 0.3–6.2)
ERYTHROCYTE [DISTWIDTH] IN BLOOD BY AUTOMATED COUNT: 12.7 % (ref 12.3–15.4)
GFR SERPL CREATININE-BSD FRML MDRD: 60 ML/MIN/1.73
GLOBULIN UR ELPH-MCNC: 2.1 GM/DL
GLUCOSE SERPL-MCNC: 120 MG/DL (ref 65–99)
HBA1C MFR BLD: 5.8 % (ref 4.8–5.6)
HCT VFR BLD AUTO: 36.1 % (ref 34–46.6)
HDLC SERPL-MCNC: 70 MG/DL (ref 40–60)
HGB BLD-MCNC: 12 G/DL (ref 12–15.9)
IMM GRANULOCYTES # BLD AUTO: 0.02 10*3/MM3 (ref 0–0.05)
IMM GRANULOCYTES NFR BLD AUTO: 0.4 % (ref 0–0.5)
LDLC SERPL CALC-MCNC: 90 MG/DL (ref 0–100)
LDLC/HDLC SERPL: 1.29 {RATIO}
LYMPHOCYTES # BLD AUTO: 1.97 10*3/MM3 (ref 0.7–3.1)
LYMPHOCYTES NFR BLD AUTO: 35.2 % (ref 19.6–45.3)
MCH RBC QN AUTO: 31.6 PG (ref 26.6–33)
MCHC RBC AUTO-ENTMCNC: 33.2 G/DL (ref 31.5–35.7)
MCV RBC AUTO: 95 FL (ref 79–97)
MONOCYTES # BLD AUTO: 0.48 10*3/MM3 (ref 0.1–0.9)
MONOCYTES NFR BLD AUTO: 8.6 % (ref 5–12)
NEUTROPHILS NFR BLD AUTO: 2.96 10*3/MM3 (ref 1.7–7)
NEUTROPHILS NFR BLD AUTO: 52.8 % (ref 42.7–76)
NRBC BLD AUTO-RTO: 0 /100 WBC (ref 0–0.2)
PLATELET # BLD AUTO: 134 10*3/MM3 (ref 140–450)
PMV BLD AUTO: 12.3 FL (ref 6–12)
POTASSIUM SERPL-SCNC: 4 MMOL/L (ref 3.5–5.2)
PROT SERPL-MCNC: 5.5 G/DL (ref 6–8.5)
RBC # BLD AUTO: 3.8 10*6/MM3 (ref 3.77–5.28)
SODIUM SERPL-SCNC: 134 MMOL/L (ref 136–145)
TRIGL SERPL-MCNC: 49 MG/DL (ref 0–150)
TSH SERPL DL<=0.05 MIU/L-ACNC: 3.59 UIU/ML (ref 0.27–4.2)
VLDLC SERPL-MCNC: 9.8 MG/DL
WBC # BLD AUTO: 5.6 10*3/MM3 (ref 3.4–10.8)

## 2020-09-21 PROCEDURE — 80061 LIPID PANEL: CPT | Performed by: INTERNAL MEDICINE

## 2020-09-21 PROCEDURE — 80053 COMPREHEN METABOLIC PANEL: CPT | Performed by: INTERNAL MEDICINE

## 2020-09-21 PROCEDURE — 85025 COMPLETE CBC W/AUTO DIFF WBC: CPT | Performed by: INTERNAL MEDICINE

## 2020-09-21 PROCEDURE — 83036 HEMOGLOBIN GLYCOSYLATED A1C: CPT | Performed by: INTERNAL MEDICINE

## 2020-09-21 PROCEDURE — 84443 ASSAY THYROID STIM HORMONE: CPT | Performed by: INTERNAL MEDICINE

## 2020-09-22 ENCOUNTER — OUTSIDE FACILITY SERVICE (OUTPATIENT)
Dept: INTERNAL MEDICINE | Facility: CLINIC | Age: 85
End: 2020-09-22

## 2020-09-22 PROCEDURE — G0180 MD CERTIFICATION HHA PATIENT: HCPCS | Performed by: INTERNAL MEDICINE

## 2020-09-24 RX ORDER — PRAVASTATIN SODIUM 40 MG
TABLET ORAL
Qty: 90 TABLET | Refills: 2 | Status: SHIPPED | OUTPATIENT
Start: 2020-09-24

## 2020-09-30 ENCOUNTER — TELEMEDICINE (OUTPATIENT)
Dept: INTERNAL MEDICINE | Facility: CLINIC | Age: 85
End: 2020-09-30

## 2020-09-30 DIAGNOSIS — I10 BENIGN ESSENTIAL HYPERTENSION: Primary | ICD-10-CM

## 2020-09-30 DIAGNOSIS — F02.818 LATE ONSET ALZHEIMER'S DISEASE WITH BEHAVIORAL DISTURBANCE (HCC): ICD-10-CM

## 2020-09-30 DIAGNOSIS — E11.9 CONTROLLED TYPE 2 DIABETES MELLITUS WITHOUT COMPLICATION, WITHOUT LONG-TERM CURRENT USE OF INSULIN (HCC): ICD-10-CM

## 2020-09-30 DIAGNOSIS — B37.2 MUCOCUTANEOUS CANDIDIASIS: ICD-10-CM

## 2020-09-30 DIAGNOSIS — G30.1 LATE ONSET ALZHEIMER'S DISEASE WITH BEHAVIORAL DISTURBANCE (HCC): ICD-10-CM

## 2020-09-30 PROCEDURE — 99214 OFFICE O/P EST MOD 30 MIN: CPT | Performed by: INTERNAL MEDICINE

## 2020-09-30 RX ORDER — NYSTATIN 100000 [USP'U]/G
POWDER TOPICAL 3 TIMES DAILY
Qty: 45 G | Refills: 5 | Status: SHIPPED | OUTPATIENT
Start: 2020-09-30

## 2020-09-30 NOTE — PROGRESS NOTES
Subjective   Valerie Davila is a 85 y.o. female.     You have chosen to receive care through a telehealth visit.  Do you consent to use a video/audio connection for your medical care today? Yes  ACTIVE PARTIES IN VIDEO VISIT  Katarzyna Knox CMA  Suyapa Davila Patient  Jaki Davila Daughter       Chief Complaint   Patient presents with   • Hypertension   • Diabetes   • Memory Loss     HPI: Patient is following   up on the blood pressure  The patient is taking the blood pressure medications as prescribed and has had no side effects. The patient is also   following up on sugar and is trying to follow a diet. The patient  Had lab work done .  The patient also  Following on  Dementia , She is due for a flu shot and has C  And home PT , daughter jaki states patient has a rash below her breasts ,  All history is from daughter today via video visit   Hyperlipidemia   Pertinent negatives include no chest pain or shortness of breath.   Hypertension   Pertinent negatives include no chest pain, palpitations or shortness of breath.      The following portions of the patient's history were reviewed and updated as appropriate: allergies, current medications, past family history, past medical history, past social history, past surgical history and problem list.    Review of Systems   Skin: Positive for rash.   per patient's daughter  afebrile    bp 128/60 ,   Objective   Physical Exam  All vitals recorded within this visit are reported by the patient's daughter.  General appearance: Normocephalic and nontraumatic  HEENT: External inspection of eyes, ears and nose appears benign, hearing appears intact  Neck: Neck appears supple, trachea in midline, thyroid appears not enlarged  Respiratory: Respiratory effort appears normal  Musculoskeletal: Moving all limbs  Range of motion of visible joints appears within normal  CNS: No gross motor or sensory deficits  No gross cranial nerve deficits  No  tremors  Psychiatry: Alert and not oriented   Memory - dementia  Patient is more verbal today and is able to reply to one or 2 questions and says hello and fine   skin - rash below breasts      Assessment/Plan   Valerie was seen today for hypertension, diabetes and memory loss.    Diagnoses and all orders for this visit:    Benign essential hypertension    Controlled type 2 diabetes mellitus without complication, without long-term current use of insulin (CMS/HCC)    Late onset Alzheimer's disease with behavioral disturbance (CMS/HCC)    Mucocutaneous candidiasis    Other orders  -     nystatin (MYCOSTATIN) 144375 UNIT/GM powder; Apply  topically to the appropriate area as directed 3 (Three) Times a Day.    Plan:  1.  Benign essential hypertension: Will continue current medication, low-sodium diet advised, Counseled to regularly check BP at home with goal averaging <130/80.   2. Diabetes  Mellitus  : will obtain   fasting CMP  and hba1c  , diet and exercise counseled , Will continue current medications  3. Candidiasis : topical nystatin  4. alzheimers dementia :       This was a video enabled telemedicine encounter.

## 2020-10-02 ENCOUNTER — TELEPHONE (OUTPATIENT)
Dept: INTERNAL MEDICINE | Facility: CLINIC | Age: 85
End: 2020-10-02

## 2020-10-02 NOTE — TELEPHONE ENCOUNTER
Spoke with [facility] in regards to Flu shot. HH does not have flu shots in stock yet, they are asking if you would like to Rx to pt pharmacy have daughter  injection HH nurse can administer.  Spoke with daughter she is ok to  shot at pharmacy

## 2020-10-05 NOTE — TELEPHONE ENCOUNTER
Mike will not allow an Rx for a flu shot to be picked up , stated must be administered at pharmacy.  Notified [facility], they suggested [facility].  Attempted to contact Jaki to let her know, no answer no VM option

## 2020-10-07 ENCOUNTER — TELEPHONE (OUTPATIENT)
Dept: INTERNAL MEDICINE | Facility: CLINIC | Age: 85
End: 2020-10-07

## 2020-10-08 RX ORDER — LEVOTHYROXINE SODIUM 88 UG/1
TABLET ORAL
Qty: 30 TABLET | Refills: 5 | Status: SHIPPED | OUTPATIENT
Start: 2020-10-08

## 2020-10-12 ENCOUNTER — E-VISIT (OUTPATIENT)
Dept: INTERNAL MEDICINE | Facility: CLINIC | Age: 85
End: 2020-10-12

## 2020-10-12 ENCOUNTER — TELEMEDICINE (OUTPATIENT)
Dept: INTERNAL MEDICINE | Facility: CLINIC | Age: 85
End: 2020-10-12

## 2020-10-12 DIAGNOSIS — G30.1 LATE ONSET ALZHEIMER'S DISEASE WITH BEHAVIORAL DISTURBANCE (HCC): ICD-10-CM

## 2020-10-12 DIAGNOSIS — I10 BENIGN ESSENTIAL HYPERTENSION: Primary | ICD-10-CM

## 2020-10-12 DIAGNOSIS — R60.0 LOCALIZED EDEMA: ICD-10-CM

## 2020-10-12 DIAGNOSIS — F02.818 LATE ONSET ALZHEIMER'S DISEASE WITH BEHAVIORAL DISTURBANCE (HCC): ICD-10-CM

## 2020-10-12 PROCEDURE — 99214 OFFICE O/P EST MOD 30 MIN: CPT | Performed by: INTERNAL MEDICINE

## 2020-10-12 NOTE — PROGRESS NOTES
Subjective   Valerie Davila is a 85 y.o. female.     You have chosen to receive care through a telehealth visit.  Do you consent to use a video/audio connection for your medical care today? Yes  Active parties in video visit  Katarzyna Knox CMA  Valerie Davila Patient   Jaki Davila Daughter         Chief Complaint   Patient presents with   • Hypertension   • Leg Swelling   • Alzheimer's Disease     HPI: Video with set with patient today, history is from daughter Jaki  patient is following up on the blood pressure  The patient is taking the blood pressure medications as prescribed and has had no side effects. The patient's daughter also states that patient has been having leg swelling which has been worsening, she is on Lasix 20 mg twice a day, she states patient is mostly bedbound and does not urinate for 12 to 18 hours at a stretch, she is also due for a flu shot and has home health and palliative care, her last labs have been reviewed with daughter Jaki today    During today's visit, I reviewed the documented allergies, medications, chief complaint, and pertinent vitals.  I have confirmed with the patient that there have been no changes since this information was discussed with my clinical team member.    The following portions of the patient's history were reviewed and updated as appropriate: allergies, current medications, past family history, past medical history, past social history, past surgical history and problem list.    Review of Systems  Afebrile  Leg swelling  Dementia    Objective   Physical Exam  All vitals recorded within this visit are reported by the patient.  General appearance: Normocephalic and nontraumatic  HEENT: External inspection of eyes, ears and nose appears benign, hearing appears intact  Neck: Neck appears supple, trachea in midline, thyroid appears not enlarged  Respiratory: Respiratory effort appears normal  Musculoskeletal: Moving all limbs  Range of  motion of visible joints appears within normal  Bilateral lower extremity edema present  CNS: No gross motor or sensory deficits  No gross cranial nerve deficits  No tremors  Psychiatry: Alert     Mumbling, incoherent    Assessment/Plan   Diagnoses and all orders for this visit:    1. Benign essential hypertension (Primary)    2. Localized edema  -     CBC & Differential  -     Comprehensive Metabolic Panel    3. Late onset Alzheimer's disease with behavioral disturbance (CMS/HCC)      Plan:  1.  Benign essential hypertension: Will continue current medication, low-sodium diet advised, Counseled to regularly check BP at home with goal averaging <130/80.   2.  Edema: We will increase Lasix 40 mg p.o. twice daily for 5 days, and then go back to 20 mg p.o. twice daily, will obtain CBC and CMP, home health care will be informed, low-sodium diet, patient's daughter has also been instructed to hold hydralazine if her blood pressure starts dropping  3.  Alzheimer's dementia: To continue with home health care and palliative care and continue current medication, will monitor  This was a video enabled telemedicine encounter.

## 2020-10-19 ENCOUNTER — LAB REQUISITION (OUTPATIENT)
Dept: LAB | Facility: HOSPITAL | Age: 85
End: 2020-10-19

## 2020-10-19 DIAGNOSIS — I12.9 HYPERTENSIVE CHRONIC KIDNEY DISEASE WITH STAGE 1 THROUGH STAGE 4 CHRONIC KIDNEY DISEASE, OR UNSPECIFIED CHRONIC KIDNEY DISEASE: ICD-10-CM

## 2020-10-19 LAB
ALBUMIN SERPL-MCNC: 3.6 G/DL (ref 3.5–5.2)
ALBUMIN/GLOB SERPL: 1.4 G/DL
ALP SERPL-CCNC: 54 U/L (ref 39–117)
ALT SERPL W P-5'-P-CCNC: 11 U/L (ref 1–33)
ANION GAP SERPL CALCULATED.3IONS-SCNC: 10.5 MMOL/L (ref 5–15)
AST SERPL-CCNC: 23 U/L (ref 1–32)
BASOPHILS # BLD AUTO: 0.04 10*3/MM3 (ref 0–0.2)
BASOPHILS NFR BLD AUTO: 0.6 % (ref 0–1.5)
BILIRUB SERPL-MCNC: 0.7 MG/DL (ref 0–1.2)
BUN SERPL-MCNC: 16 MG/DL (ref 8–23)
BUN/CREAT SERPL: 11.2 (ref 7–25)
CALCIUM SPEC-SCNC: 8.9 MG/DL (ref 8.6–10.5)
CHLORIDE SERPL-SCNC: 95 MMOL/L (ref 98–107)
CO2 SERPL-SCNC: 28.5 MMOL/L (ref 22–29)
CREAT SERPL-MCNC: 1.43 MG/DL (ref 0.57–1)
DEPRECATED RDW RBC AUTO: 46.2 FL (ref 37–54)
EOSINOPHIL # BLD AUTO: 0.12 10*3/MM3 (ref 0–0.4)
EOSINOPHIL NFR BLD AUTO: 1.9 % (ref 0.3–6.2)
ERYTHROCYTE [DISTWIDTH] IN BLOOD BY AUTOMATED COUNT: 12.9 % (ref 12.3–15.4)
GFR SERPL CREATININE-BSD FRML MDRD: 42 ML/MIN/1.73
GLOBULIN UR ELPH-MCNC: 2.6 GM/DL
GLUCOSE SERPL-MCNC: 131 MG/DL (ref 65–99)
HCT VFR BLD AUTO: 39.3 % (ref 34–46.6)
HGB BLD-MCNC: 12.7 G/DL (ref 12–15.9)
IMM GRANULOCYTES # BLD AUTO: 0.02 10*3/MM3 (ref 0–0.05)
IMM GRANULOCYTES NFR BLD AUTO: 0.3 % (ref 0–0.5)
LYMPHOCYTES # BLD AUTO: 1.74 10*3/MM3 (ref 0.7–3.1)
LYMPHOCYTES NFR BLD AUTO: 28.1 % (ref 19.6–45.3)
MCH RBC QN AUTO: 31.4 PG (ref 26.6–33)
MCHC RBC AUTO-ENTMCNC: 32.3 G/DL (ref 31.5–35.7)
MCV RBC AUTO: 97.3 FL (ref 79–97)
MONOCYTES # BLD AUTO: 0.5 10*3/MM3 (ref 0.1–0.9)
MONOCYTES NFR BLD AUTO: 8.1 % (ref 5–12)
NEUTROPHILS NFR BLD AUTO: 3.78 10*3/MM3 (ref 1.7–7)
NEUTROPHILS NFR BLD AUTO: 61 % (ref 42.7–76)
NRBC BLD AUTO-RTO: 0 /100 WBC (ref 0–0.2)
PLATELET # BLD AUTO: 147 10*3/MM3 (ref 140–450)
PMV BLD AUTO: 11.7 FL (ref 6–12)
POTASSIUM SERPL-SCNC: 4.5 MMOL/L (ref 3.5–5.2)
PROT SERPL-MCNC: 6.2 G/DL (ref 6–8.5)
RBC # BLD AUTO: 4.04 10*6/MM3 (ref 3.77–5.28)
SODIUM SERPL-SCNC: 134 MMOL/L (ref 136–145)
WBC # BLD AUTO: 6.2 10*3/MM3 (ref 3.4–10.8)

## 2020-10-19 PROCEDURE — 80053 COMPREHEN METABOLIC PANEL: CPT | Performed by: INTERNAL MEDICINE

## 2020-10-19 PROCEDURE — 85025 COMPLETE CBC W/AUTO DIFF WBC: CPT | Performed by: INTERNAL MEDICINE

## 2020-10-22 ENCOUNTER — OFFICE VISIT (OUTPATIENT)
Dept: INTERNAL MEDICINE | Facility: CLINIC | Age: 85
End: 2020-10-22

## 2020-10-22 VITALS — WEIGHT: 210 LBS | BODY MASS INDEX: 33.91 KG/M2

## 2020-10-22 DIAGNOSIS — Z00.00 ROUTINE GENERAL MEDICAL EXAMINATION AT A HEALTH CARE FACILITY: Primary | ICD-10-CM

## 2020-10-22 DIAGNOSIS — F02.818 LATE ONSET ALZHEIMER'S DISEASE WITH BEHAVIORAL DISTURBANCE (HCC): ICD-10-CM

## 2020-10-22 DIAGNOSIS — E78.2 MIXED HYPERLIPIDEMIA: ICD-10-CM

## 2020-10-22 DIAGNOSIS — R73.01 IMPAIRED FASTING GLUCOSE: ICD-10-CM

## 2020-10-22 DIAGNOSIS — I10 BENIGN ESSENTIAL HYPERTENSION: ICD-10-CM

## 2020-10-22 DIAGNOSIS — E03.8 ADULT ONSET HYPOTHYROIDISM: ICD-10-CM

## 2020-10-22 DIAGNOSIS — N18.30 STAGE 3 CHRONIC KIDNEY DISEASE, UNSPECIFIED WHETHER STAGE 3A OR 3B CKD (HCC): ICD-10-CM

## 2020-10-22 DIAGNOSIS — G30.1 LATE ONSET ALZHEIMER'S DISEASE WITH BEHAVIORAL DISTURBANCE (HCC): ICD-10-CM

## 2020-10-22 PROCEDURE — 99397 PER PM REEVAL EST PAT 65+ YR: CPT | Performed by: INTERNAL MEDICINE

## 2020-10-22 PROCEDURE — G0439 PPPS, SUBSEQ VISIT: HCPCS | Performed by: INTERNAL MEDICINE

## 2020-10-22 NOTE — PATIENT INSTRUCTIONS
Medicare Wellness  Personal Prevention Plan of Service     Date of Office Visit:  10/22/2020  Encounter Provider:  Katarzyna Griggs MD  Place of Service:  [facility]  Patient Name: Valerie Davila  :  1935    As part of the Medicare Wellness portion of your visit today, we are providing you with this personalized preventive plan of services (PPPS). This plan is based upon recommendations of the [place] Preventive Services Task Force (USPSTF) and the Advisory Committee on Immunization Practices (ACIP).    This lists the preventive care services that should be considered, and provides dates of when you are due. Items listed as completed are up-to-date and do not require any further intervention.    Health Maintenance   Topic Date Due   • ZOSTER VACCINE (1 of 2) 1986   • MAMMOGRAM  2018   • URINE MICROALBUMIN  2019   • DIABETIC EYE EXAM  2019   • ANNUAL WELLNESS VISIT  2020   • INFLUENZA VACCINE  2020   • HEMOGLOBIN A1C  2021   • LIPID PANEL  2021   • TDAP/TD VACCINES (2 - Td) 10/04/2022   • Pneumococcal Vaccine 65+  Completed       No orders of the defined types were placed in this encounter.      No follow-ups on file.          Fall Prevention in the Home, Adult  Falls can cause injuries and can affect people from all age groups. There are many simple things that you can do to make your home safe and to help prevent falls. Ask for help when making these changes, if needed.  What actions can I take to prevent falls?  General instructions  · Use good lighting in all rooms. Replace any light bulbs that burn out.  · Turn on lights if it is dark. Use night-lights.  · Place frequently used items in easy-to-reach places. Lower the shelves around your home if necessary.  · Set up furniture so that there are clear paths around it. Avoid moving your furniture around.  · Remove throw rugs and other tripping hazards from the  floor.  · Avoid walking on wet floors.  · Fix any uneven floor surfaces.  · Add color or contrast paint or tape to grab bars and handrails in your home. Place contrasting color strips on the first and last steps of stairways.  · When you use a stepladder, make sure that it is completely opened and that the sides are firmly locked. Have someone hold the ladder while you are using it. Do not climb a closed stepladder.  · Be aware of any and all pets.  What can I do in the bathroom?         · Keep the floor dry. Immediately clean up any water that spills onto the floor.  · Remove soap buildup in the tub or shower on a regular basis.  · Use non-skid mats or decals on the floor of the tub or shower.  · Attach bath mats securely with double-sided, non-slip rug tape.  · If you need to sit down while you are in the shower, use a plastic, non-slip stool.  · Install grab bars by the toilet and in the tub and shower. Do not use towel bars as grab bars.  What can I do in the bedroom?  · Make sure that a bedside light is easy to reach.  · Do not use oversized bedding that drapes onto the floor.  · Have a firm chair that has side arms to use for getting dressed.  What can I do in the kitchen?  · Clean up any spills right away.  · If you need to reach for something above you, use a sturdy step stool that has a grab bar.  · Keep electrical cables out of the way.  · Do not use floor polish or wax that makes floors slippery. If you must use wax, make sure that it is non-skid floor wax.  What can I do in the stairways?  · Do not leave any items on the stairs.  · Make sure that you have a light switch at the top of the stairs and the bottom of the stairs. Have them installed if you do not have them.  · Make sure that there are handrails on both sides of the stairs. Fix handrails that are broken or loose. Make sure that handrails are as long as the stairways.  · Install non-slip stair treads on all stairs in your home.  · Avoid having  throw rugs at the top or bottom of stairways, or secure the rugs with carpet tape to prevent them from moving.  · Choose a carpet design that does not hide the edge of steps on the stairway.  · Check any carpeting to make sure that it is firmly attached to the stairs. Fix any carpet that is loose or worn.  What can I do on the outside of my home?  · Use bright outdoor lighting.  · Regularly repair the edges of walkways and driveways and fix any cracks.  · Remove high doorway thresholds.  · Trim any shrubbery on the main path into your home.  · Regularly check that handrails are securely fastened and in good repair. Both sides of any steps should have handrails.  · Install guardrails along the edges of any raised decks or porches.  · Clear walkways of debris and clutter, including tools and rocks.  · Have leaves, snow, and ice cleared regularly.  · Use sand or salt on walkways during winter months.  · In the garage, clean up any spills right away, including grease or oil spills.  What other actions can I take?  · Wear closed-toe shoes that fit well and support your feet. Wear shoes that have rubber soles or low heels.  · Use mobility aids as needed, such as canes, walkers, scooters, and crutches.  · Review your medicines with your health care provider. Some medicines can cause dizziness or changes in blood pressure, which increase your risk of falling.  Talk with your health care provider about other ways that you can decrease your risk of falls. This may include working with a physical therapist or  to improve your strength, balance, and endurance.  Where to find more information  · Centers for Disease Control and Prevention, STEADI: https://www.cdc.gov  · National [place] on Aging: https://my8xbps.anil.nih.gov  Contact a health care provider if:  · You are afraid of falling at home.  · You feel weak, drowsy, or dizzy at home.  · You fall at home.  Summary  · There are many simple things that you can do to  make your home safe and to help prevent falls.  · Ways to make your home safe include removing tripping hazards and installing grab bars in the bathroom.  · Ask for help when making these changes in your home.  This information is not intended to replace advice given to you by your health care provider. Make sure you discuss any questions you have with your health care provider.  Document Released: 12/08/2003 Document Revised: 11/30/2018 Document Reviewed: 08/02/2018  Elsevier Patient Education © 2020 [employer]

## 2020-10-22 NOTE — PROGRESS NOTES
The ABCs of the Annual Wellness Visit  Subsequent Medicare Wellness Visit    You have chosen to receive care through a telehealth visit.  Do you consent to use a video/audio connection for your medical care today? Yes  Active parties in video visit  Katarzyna Knox CMA  Valerie Davila Patient  Jaki Davila Daughter    Chief Complaint   Patient presents with   • Medicare Wellness-subsequent   • Hypertension   • Hyperlipidemia   • Diabetes   • Alzheimer's Disease       Subjective   History of Present Illness:  Valerie Davila is a 85 y.o. female who presents for a Subsequent Medicare Wellness Visit and physical.  Patient is here to follow up on the blood pressure  The patient is taking the blood pressure medications as prescribed and has had no side effects. The patient is also here to follow up on the cholesterol and is trying to follow a diet. The patient is  also here to follow up on sugar and thyroid and hand lab work done .  Patient is also following up on dementia which is at her baseline, her daughter states she has not been eating a lot, the patient's history is from the daughter Jaki today who is her historian and  caretaker, patient does have home health care and palliative care , video visit with patient and daughter Jaki today  Hyperlipidemia   Pertinent negatives include no chest pain or shortness of breath.   Hypertension   Pertinent negatives include no chest pain, palpitations or shortness of breath.    During today's visit, I reviewed the documented allergies, medications, chief complaint, and pertinent vitals.  I have confirmed with the patient that there have been no changes since this information was discussed with my clinical team member.    HEALTH RISK ASSESSMENT    Recent Hospitalizations:  No hospitalization(s) within the last year.    Current Medical Providers:  Patient Care Team:  Katarzyna Griggs MD as PCP - General    Smoking Status:  Social History     Tobacco Use    Smoking Status Former Smoker   • Packs/day: 0.50   • Years: 25.00   • Pack years: 12.50   • Types: Cigarettes   • Quit date:    • Years since quittin.8   Smokeless Tobacco Never Used       Alcohol Consumption:  Social History     Substance and Sexual Activity   Alcohol Use No       Depression Screen:   PHQ-2/PHQ-9 Depression Screening 10/22/2020   Little interest or pleasure in doing things 0   Feeling down, depressed, or hopeless 0   Trouble falling or staying asleep, or sleeping too much -   Feeling tired or having little energy -   Poor appetite or overeating -   Feeling bad about yourself - or that you are a failure or have let yourself or your family down -   Trouble concentrating on things, such as reading the newspaper or watching television -   Moving or speaking so slowly that other people could have noticed. Or the opposite - being so fidgety or restless that you have been moving around a lot more than usual -   Thoughts that you would be better off dead, or of hurting yourself in some way -   Total Score 0   If you checked off any problems, how difficult have these problems made it for you to do your work, take care of things at home, or get along with other people? -       Fall Risk Screen:  STEADI Fall Risk Assessment was completed, and patient is at MODERATE risk for falls. Assessment completed on:10/22/2020    Health Habits and Functional and Cognitive Screening:  Functional & Cognitive Status 10/22/2020   Do you have difficulty preparing food and eating? Yes   Do you have difficulty bathing yourself, getting dressed or grooming yourself? Yes   Do you have difficulty using the toilet? Yes   Do you have difficulty moving around from place to place? Yes   Do you have trouble with steps or getting out of a bed or a chair? Yes   Current Diet Well Balanced Diet   Dental Exam Not up to date   Eye Exam Up to date   Exercise (times per week) 0 times per week   Current Exercise Activities Include  None   Do you need help using the phone?  Yes   Are you deaf or do you have serious difficulty hearing?  No   Do you need help with transportation? Yes   Do you need help shopping? Yes   Do you need help preparing meals?  Yes   Do you need help with housework?  Yes   Do you need help with laundry? Yes   Do you need help taking your medications? Yes   Do you need help managing money? Yes   Do you ever drive or ride in a car without wearing a seat belt? No   Have you felt unusual stress, anger or loneliness in the last month? No   Who do you live with? Child   If you need help, do you have trouble finding someone available to you? No   Have you been bothered in the last four weeks by sexual problems? No   Do you have difficulty concentrating, remembering or making decisions? Yes         Does the patient have evidence of cognitive impairment? Yes    Asprin use counseling:Does not need ASA (and currently is not on it)    Age-appropriate Screening Schedule:  Refer to the list below for future screening recommendations based on patient's age, sex and/or medical conditions. Orders for these recommended tests are listed in the plan section. The patient has been provided with a written plan.    Health Maintenance   Topic Date Due   • ZOSTER VACCINE (1 of 2) 09/28/1986   • MAMMOGRAM  02/05/2018   • URINE MICROALBUMIN  01/23/2019   • DIABETIC EYE EXAM  07/16/2019   • INFLUENZA VACCINE  08/01/2020   • HEMOGLOBIN A1C  03/21/2021   • LIPID PANEL  09/21/2021   • TDAP/TD VACCINES (2 - Td) 10/04/2022          The following portions of the patient's history were reviewed and updated as appropriate: allergies, current medications, past family history, past medical history, past social history, past surgical history and problem list.    Outpatient Medications Prior to Visit   Medication Sig Dispense Refill   • albuterol (PROVENTIL HFA;VENTOLIN HFA) 108 (90 Base) MCG/ACT inhaler Inhale 2 puffs Every 4 (Four) Hours As Needed for  Wheezing. 1 inhaler 0   • carvedilol (COREG) 25 MG tablet TAKE ONE TABLET BY MOUTH TWICE A DAY WITH MEALS 180 tablet 2   • escitalopram (LEXAPRO) 20 MG tablet TAKE ONE TABLET BY MOUTH DAILY 90 tablet 2   • furosemide (LASIX) 40 MG tablet Take 1 tablet by mouth 2 (Two) Times a Day. 60 tablet 5   • hydrALAZINE (APRESOLINE) 50 MG tablet Take 1 tablet by mouth 2 (two) times a day. 60 tablet 5   • lamoTRIgine (LaMICtal) 25 MG tablet Take 25 mg by mouth Daily.     • levothyroxine (SYNTHROID, LEVOTHROID) 88 MCG tablet TAKE ONE TABLET BY MOUTH DAILY 30 tablet 5   • memantine (NAMENDA) 10 MG tablet Take 10 mg by mouth 2 (Two) Times a Day.     • Misc. Devices (COMMODE BEDSIDE) misc Use as directed 1 each 0   • nystatin (MYCOSTATIN) 226645 UNIT/GM powder Apply  topically to the appropriate area as directed 3 (Three) Times a Day. 45 g 5   • olmesartan (BENICAR) 40 MG tablet Take 1 tablet by mouth Daily. 90 tablet 3   • pantoprazole (PROTONIX) 40 MG EC tablet TAKE ONE TABLET 30 MINUTES BEFORE BREAKFAST DAILY 30 tablet 5   • potassium chloride (KAYCIEL) 20 MEQ/15ML (10%) solution Take 15 mL by mouth Daily. 473 mL 11   • pravastatin (PRAVACHOL) 40 MG tablet TAKE ONE TABLET BY MOUTH DAILY 90 tablet 2   • QUEtiapine (SEROquel) 25 MG tablet Take 25 mg by mouth Every Night.     • timolol (BETIMOL) 0.5 % ophthalmic solution Administer 1 drop to both eyes Daily.     • travoprost, TA free, (TRAVATAN Z) 0.004 % solution ophthalmic solution Administer 1 drop to both eyes Daily.     • traZODone (DESYREL) 100 MG tablet Take 1 tablet by mouth Every Night. 90 tablet 3     No facility-administered medications prior to visit.        Patient Active Problem List   Diagnosis   • Abnormal weight loss   • Acute bronchitis   • Alzheimer's dementia with behavioral disturbance (CMS/HCC)   • Benign essential hypertension   • Dementia (CMS/HCC)   • Type 2 diabetes mellitus (CMS/HCC)   • Dizziness   • Edema   • Gastroesophageal reflux disease with  esophagitis   • Syncope   • Fatigue   • Hypothyroidism   • Memory loss   • Breast pain   • Proteinuria   • Multiple-type hyperlipidemia   • Subacute delirium   • Uncontrolled type 2 diabetes mellitus (CMS/HCC)   • Cobalamin deficiency   • Vitamin D deficiency   • Diverticulosis of colon   • Influenza vaccine needed   • Dysphagia   • Chronic kidney disease, stage II (mild)   • Late onset Alzheimer's disease with behavioral disturbance (CMS/HCC)       Advanced Care Planning:  ACP discussion was held with the patient during this visit. Patient has an advance directive in EMR which is still valid.     Review of Systems - per daughter beatriz  Afebrile  Dementia  Leg swelling    Compared to one year ago, the patient feels her physical health is worse.  Compared to one year ago, the patient feels her mental health is worse.    Reviewed chart for potential of high risk medication in the elderly: yes  Reviewed chart for potential of harmful drug interactions in the elderly:yes    Objective      Bp yesterday 106/72 ,   todays vitals  : 141-78 , pulse 54-57 , afebrile   Physical Exam   All vitals recorded within this visit are reported by the patient's daughter today.  General appearance: Normocephalic and nontraumatic  HEENT: External inspection of eyes, ears and nose appears benign, hearing appears intact  Neck: Neck appears supple, trachea in midline, thyroid appears not enlarged  Respiratory: Respiratory effort appears normal  Musculoskeletal: Moving all limbs, sitting in recliner   Bilateral lower leg edema present  Range of motion of visible joints appears within normal  CNS: No gross motor or sensory deficits  No gross cranial nerve deficits  No tremors  Psychiatry: Alert, and answering simple questions but patient is not really understanding the questions and answering yes or no to every question and babbling in between, dementia is at baseline      Lab Results   Component Value Date    TRIG 49 09/21/2020    HDL 70 (H)  09/21/2020    LDL 90 09/21/2020    VLDL 9.8 09/21/2020    HGBA1C 5.80 (H) 09/21/2020        Assessment/Plan   Medicare Risks and Personalized Health Plan  CMS Preventative Services Quick Reference  Advance Directive Discussion  Dementia/Memory   Diabetic Lab Screening   Fall Risk  Hearing Problem    The above risks/problems have been discussed with the patient.  Pertinent information has been shared with the patient in the After Visit Summary.  Follow up plans and orders are seen below in the Assessment/Plan Section.    Diagnoses and all orders for this visit:    1. Routine general medical examination at a health care facility (Primary)    2. Benign essential hypertension    3. Mixed hyperlipidemia    4. Impaired fasting glucose    5. Adult onset hypothyroidism    6. Late onset Alzheimer's disease with behavioral disturbance (CMS/[facility])    7. Stage 3 chronic kidney disease, unspecified whether stage 3a or 3b CKD       Plan:  1.physical: Physical exam conducted today, reviewed fasting lab work,   Impression: healthy adult Patient. Currently, patient eats a healthy diet. Screening lab work includes glucose, lipid profile and complete blood count . The risks and benefits of immunizations were discussed and immunizations are up to date. Advice and education were given regarding nutrition and aerobic exercise. Patient discussion: discussed with the patient.  Annual Wellness Visit , physical with preventive exam as well as age and risk appropriate counseling completed.   Advance Directive Planning: paperwork and instructions were given to the patient.   Patient Discussion: plan discussed with the patient, follow-up visit needed in one year. Medication Review and medication list updated  2.  Benign essential hypertension: Will continue current medication, low-sodium diet advised, Counseled to regularly check BP at home with goal averaging <130/80.   3.mixed hyperlipidemia:  reviewed  fasting CMP and lipid panel.  Diet and  exercise counseled,  Will continue current medications  4. impaired glucose   :   reviewed  fasting CMP  and hba1c 5.8  , diet and exercise counseled  5 . hypothyroidism:  reviewed  tsh , and continue levothyroxine  6.  Alzheimer's dementia: To continue current medication, to continue with home health care and palliative care, patient's daughter states that she has spoken to her siblings and they want to continue comfort care at this time  7.  CRI stage III: Labs reviewed, creatinine 1.4, oral hydration advised, patient's daughter states that she does not want the patient to see any specialist and just continue with comfort care at this time  Follow Up:  Return in 1 year (on 10/22/2021) for Medicare Wellness.     This was a  video enabled telemedicine encounter.    An After Visit Summary and PPPS were given to the patient.

## 2020-10-28 ENCOUNTER — TELEPHONE (OUTPATIENT)
Dept: INTERNAL MEDICINE | Facility: CLINIC | Age: 85
End: 2020-10-28

## 2020-11-17 ENCOUNTER — HOSPITAL ENCOUNTER (EMERGENCY)
Facility: HOSPITAL | Age: 85
Discharge: HOME | End: 2020-11-17
Attending: EMERGENCY MEDICINE | Admitting: EMERGENCY MEDICINE

## 2020-11-17 ENCOUNTER — TELEPHONE (OUTPATIENT)
Dept: BEHAVIORAL HEALTH | Facility: CLINIC | Age: 85
End: 2020-11-17

## 2020-11-17 ENCOUNTER — APPOINTMENT (OUTPATIENT)
Dept: GENERAL RADIOLOGY | Facility: HOSPITAL | Age: 85
End: 2020-11-17

## 2020-11-17 VITALS
SYSTOLIC BLOOD PRESSURE: 183 MMHG | DIASTOLIC BLOOD PRESSURE: 68 MMHG | TEMPERATURE: 97.2 F | RESPIRATION RATE: 14 BRPM | HEART RATE: 55 BPM | OXYGEN SATURATION: 96 %

## 2020-11-17 DIAGNOSIS — R34 DECREASED URINE OUTPUT: Primary | ICD-10-CM

## 2020-11-17 LAB
ALBUMIN SERPL-MCNC: 3.7 G/DL (ref 3.5–5.2)
ALBUMIN/GLOB SERPL: 1.1 G/DL
ALP SERPL-CCNC: 67 U/L (ref 39–117)
ALT SERPL W P-5'-P-CCNC: 15 U/L (ref 1–33)
ANION GAP SERPL CALCULATED.3IONS-SCNC: 7.2 MMOL/L (ref 5–15)
AST SERPL-CCNC: 25 U/L (ref 1–32)
BASOPHILS # BLD AUTO: 0.05 10*3/MM3 (ref 0–0.2)
BASOPHILS NFR BLD AUTO: 0.7 % (ref 0–1.5)
BILIRUB SERPL-MCNC: 0.5 MG/DL (ref 0–1.2)
BILIRUB UR QL STRIP: NEGATIVE
BUN SERPL-MCNC: 12 MG/DL (ref 8–23)
BUN/CREAT SERPL: 11 (ref 7–25)
CALCIUM SPEC-SCNC: 9.5 MG/DL (ref 8.6–10.5)
CHLORIDE SERPL-SCNC: 96 MMOL/L (ref 98–107)
CLARITY UR: CLEAR
CO2 SERPL-SCNC: 33.8 MMOL/L (ref 22–29)
COLOR UR: YELLOW
CREAT SERPL-MCNC: 1.09 MG/DL (ref 0.57–1)
DEPRECATED RDW RBC AUTO: 48 FL (ref 37–54)
EOSINOPHIL # BLD AUTO: 0.18 10*3/MM3 (ref 0–0.4)
EOSINOPHIL NFR BLD AUTO: 2.4 % (ref 0.3–6.2)
ERYTHROCYTE [DISTWIDTH] IN BLOOD BY AUTOMATED COUNT: 12.9 % (ref 12.3–15.4)
GFR SERPL CREATININE-BSD FRML MDRD: 58 ML/MIN/1.73
GLOBULIN UR ELPH-MCNC: 3.3 GM/DL
GLUCOSE BLDC GLUCOMTR-MCNC: 133 MG/DL (ref 70–130)
GLUCOSE BLDC GLUCOMTR-MCNC: 145 MG/DL (ref 70–130)
GLUCOSE SERPL-MCNC: 149 MG/DL (ref 65–99)
GLUCOSE UR STRIP-MCNC: NEGATIVE MG/DL
HCT VFR BLD AUTO: 42.6 % (ref 34–46.6)
HGB BLD-MCNC: 13.6 G/DL (ref 12–15.9)
HGB UR QL STRIP.AUTO: NEGATIVE
HOLD SPECIMEN: NORMAL
HOLD SPECIMEN: NORMAL
IMM GRANULOCYTES # BLD AUTO: 0.03 10*3/MM3 (ref 0–0.05)
IMM GRANULOCYTES NFR BLD AUTO: 0.4 % (ref 0–0.5)
KETONES UR QL STRIP: NEGATIVE
LEUKOCYTE ESTERASE UR QL STRIP.AUTO: NEGATIVE
LYMPHOCYTES # BLD AUTO: 2.56 10*3/MM3 (ref 0.7–3.1)
LYMPHOCYTES NFR BLD AUTO: 34 % (ref 19.6–45.3)
MAGNESIUM SERPL-MCNC: 2.2 MG/DL (ref 1.6–2.4)
MCH RBC QN AUTO: 32 PG (ref 26.6–33)
MCHC RBC AUTO-ENTMCNC: 31.9 G/DL (ref 31.5–35.7)
MCV RBC AUTO: 100.2 FL (ref 79–97)
MONOCYTES # BLD AUTO: 0.7 10*3/MM3 (ref 0.1–0.9)
MONOCYTES NFR BLD AUTO: 9.3 % (ref 5–12)
NEUTROPHILS NFR BLD AUTO: 4.01 10*3/MM3 (ref 1.7–7)
NEUTROPHILS NFR BLD AUTO: 53.2 % (ref 42.7–76)
NITRITE UR QL STRIP: NEGATIVE
NRBC BLD AUTO-RTO: 0 /100 WBC (ref 0–0.2)
NT-PROBNP SERPL-MCNC: 771.8 PG/ML (ref 0–1800)
PH UR STRIP.AUTO: 6.5 [PH] (ref 5–8)
PLATELET # BLD AUTO: 160 10*3/MM3 (ref 140–450)
PMV BLD AUTO: 11.4 FL (ref 6–12)
POTASSIUM SERPL-SCNC: 4.4 MMOL/L (ref 3.5–5.2)
PROT SERPL-MCNC: 7 G/DL (ref 6–8.5)
PROT UR QL STRIP: NEGATIVE
RBC # BLD AUTO: 4.25 10*6/MM3 (ref 3.77–5.28)
SODIUM SERPL-SCNC: 137 MMOL/L (ref 136–145)
SP GR UR STRIP: 1.01 (ref 1–1.03)
TROPONIN T SERPL-MCNC: 0.03 NG/ML (ref 0–0.03)
UROBILINOGEN UR QL STRIP: NORMAL
WBC # BLD AUTO: 7.53 10*3/MM3 (ref 3.4–10.8)
WHOLE BLOOD HOLD SPECIMEN: NORMAL
WHOLE BLOOD HOLD SPECIMEN: NORMAL

## 2020-11-17 PROCEDURE — 93005 ELECTROCARDIOGRAM TRACING: CPT | Performed by: EMERGENCY MEDICINE

## 2020-11-17 PROCEDURE — 83880 ASSAY OF NATRIURETIC PEPTIDE: CPT | Performed by: PHYSICIAN ASSISTANT

## 2020-11-17 PROCEDURE — 80053 COMPREHEN METABOLIC PANEL: CPT | Performed by: EMERGENCY MEDICINE

## 2020-11-17 PROCEDURE — 85025 COMPLETE CBC W/AUTO DIFF WBC: CPT | Performed by: EMERGENCY MEDICINE

## 2020-11-17 PROCEDURE — 83735 ASSAY OF MAGNESIUM: CPT | Performed by: EMERGENCY MEDICINE

## 2020-11-17 PROCEDURE — 84484 ASSAY OF TROPONIN QUANT: CPT | Performed by: EMERGENCY MEDICINE

## 2020-11-17 PROCEDURE — 82962 GLUCOSE BLOOD TEST: CPT

## 2020-11-17 PROCEDURE — 99284 EMERGENCY DEPT VISIT MOD MDM: CPT

## 2020-11-17 PROCEDURE — P9612 CATHETERIZE FOR URINE SPEC: HCPCS

## 2020-11-17 PROCEDURE — 71045 X-RAY EXAM CHEST 1 VIEW: CPT

## 2020-11-17 PROCEDURE — 81003 URINALYSIS AUTO W/O SCOPE: CPT | Performed by: EMERGENCY MEDICINE

## 2020-11-17 RX ORDER — DEXTROSE MONOHYDRATE 25 G/50ML
50 INJECTION, SOLUTION INTRAVENOUS
Status: DISCONTINUED | OUTPATIENT
Start: 2020-11-17 | End: 2020-11-17

## 2020-11-17 RX ORDER — SODIUM CHLORIDE 0.9 % (FLUSH) 0.9 %
10 SYRINGE (ML) INJECTION AS NEEDED
Status: DISCONTINUED | OUTPATIENT
Start: 2020-11-17 | End: 2020-11-17 | Stop reason: HOSPADM

## 2020-11-17 RX ADMIN — SODIUM CHLORIDE 500 ML: 9 INJECTION, SOLUTION INTRAVENOUS at 19:28

## 2020-11-17 NOTE — TELEPHONE ENCOUNTER
EVELINA LEÓN [facility] CALLED TO LET US KNOW THAT SHE WENT TO THE MONTHLY HOME VISIT THIS MORNING AND THE DAUGHTER IN CONCERNED BECAUSE THE PATIENT HAS NOT VOIDED SINCE EARLY THIS MORNING OR LAST NIGHT. SHE SAYS THAT THIS IS EVEN WITH AN INCREASE IN THE PATIENT'S LASIX TO 80 MG. SHE THINKS PATIENT IS DEHYDRATED AND WANTED TO LET THE NURSE KNOW BEFORE PATIENT'S VISIT TOMORROW. IF YOU HAVE ANY QUESTIONS, EVELINA CAN BE REACHED [phone].

## 2020-11-17 NOTE — ED PROVIDER NOTES
"Subjective   This patient comes in for evaluation of poor p.o. intake for 48 hours, mild cough, decreased urinary output and concern for dehydration.  She has a history of Alzheimer's disease and family gives the history.  Patient is awake and alert but disoriented and this is her baseline.  She denies any pain.  Family states she is having issues with swallowing due to the Alzheimer's and they are concerned she may have aspirated as she had a slight wheeze and cough.  Oxygen saturation 95% on room air.  Blood pressure 162/58 heart rate normal or slightly bradycardic.  She has some mild edema to her lower extremities but again she has a history of CHF and this is chronic for her daughter states there is some concern about her renal function so we will check this.  Overall the patient appears nontoxic or fingerstick blood glucose was noted to be 45          Review of Systems   Constitutional: Negative.         Poor p.o. intake   HENT: Negative.    Eyes: Negative.    Respiratory: Positive for cough.    Cardiovascular: Negative.    Gastrointestinal: Negative.    Genitourinary: Positive for decreased urine volume.   Musculoskeletal: Negative.    Skin: Negative.    Neurological: Negative.    Psychiatric/Behavioral: Negative.        Past Medical History:   Diagnosis Date   • Abnormal weight loss    • Acute bronchitis    • Allergic rhinitis    • Anxiety    • Arthritis    • Benign neoplasm of large bowel     Colon polyp   • Body piercing     EARS   • Cellulitis    • Confusion     DAUGHTER REPORTS \"SHE'S A SUN DOWNER\"    • Diabetes (CMS/[facility]) 2018   • Diarrhea    • Diverticulitis    • Diverticulitis of colon    • Dizziness    • Dysphagia     DAUGHTER REPORTS SHE HAS NOTICED THAT PATIENT GETS CHOKED EASILY    • Early onset Alzheimer's disease with behavioral disturbance (CMS/[facility])    • Edema     DAUGHTER REPORTS OF BLE'S   • Elevated cholesterol    • Enthesopathy of knee    • Esophageal reflux    • Fainting    • Full dentures  "    DAUGHTER INSTRUCTED NO ADHESIVES THE DOS   • Glaucoma    • Hearing loss     NO USE OF HEARING AIDS   • Hyperlipidemia    • Hypertension    • Hypothyroidism    • Knee pain    • Loss of memory    • Mastodynia    • Osteoarthritis of knee    • Polyp of colon    • Proteinuria    • Seasonal allergies    • Subacute delirium    • Vertigo    • Vitamin D deficiency    • Wears glasses        Allergies   Allergen Reactions   • Keflex [Cephalexin] Swelling     Swelling , wheezing, flushing   • Aricept [Donepezil Hcl] Confusion   • Aspirin Swelling and Unknown - Low Severity     DAUGHTER REPORTS HER MOTHER HAS ALWAYS LISTED AS AN ALLERGY BUT THAT SHE DOES NOT KNOW REACTION TYPE     • Lactose Intolerance (Gi) GI Intolerance   • Metformin Diarrhea   • Penicillins Unknown - Low Severity     Daughter reports patient has been allergic to this since she was a child and has not had it since so she is unsure what it done to her.       Past Surgical History:   Procedure Laterality Date   • CATARACT EXTRACTION Bilateral 1990   • COLONOSCOPY     • ENDOSCOPY N/A 11/8/2018    Procedure: ESOPHAGOGASTRODUODENOSCOPY WITH BIOPSY AND DILIATATION;  Surgeon: Yunior Acosta MD;  Location: [facility] ENDOSCOPY;  Service: Gastroenterology   • HYSTERECTOMY  1983    and bilateral salpingo-oophorectomy   • JOINT REPLACEMENT Right 2013    KNEE REPLACEMENT   • MOUTH SURGERY      FULL MOUTH EXTRACTION       Family History   Problem Relation Age of Onset   • Pneumonia Mother         CAUSED HER DEATH IN HER 70'S   • Heart attack Father    • Stroke Other    • Colon cancer Neg Hx    • Cirrhosis Neg Hx    • Liver disease Neg Hx    • Liver cancer Neg Hx    • Stomach cancer Neg Hx    • Esophageal cancer Neg Hx    • Crohn's disease Neg Hx    • Ulcerative colitis Neg Hx        Social History     Socioeconomic History   • Marital status: Single     Spouse name: Not on file   • Number of children: Not on file   • Years of education: Not on file   • Highest education  level: Not on file   Tobacco Use   • Smoking status: Former Smoker     Packs/day: 0.50     Years: 25.00     Pack years: 12.50     Types: Cigarettes     Quit date:      Years since quittin.9   • Smokeless tobacco: Never Used   Substance and Sexual Activity   • Alcohol use: No   • Drug use: No   • Sexual activity: Defer           Objective   Physical Exam  Vitals signs and nursing note reviewed.   Constitutional:       Appearance: Normal appearance. She is obese.   HENT:      Head: Normocephalic and atraumatic.      Nose: Nose normal.   Eyes:      Extraocular Movements: Extraocular movements intact.   Neck:      Musculoskeletal: Normal range of motion.   Cardiovascular:      Rate and Rhythm: Normal rate and regular rhythm.   Pulmonary:      Effort: Pulmonary effort is normal.      Breath sounds: Normal breath sounds.   Abdominal:      Palpations: Abdomen is soft.      Tenderness: There is no abdominal tenderness.   Musculoskeletal:      Comments: Mild lower extremity edema bilaterally   Skin:     General: Skin is warm and dry.   Neurological:      General: No focal deficit present.      Mental Status: She is alert.   Psychiatric:         Mood and Affect: Mood normal.         Behavior: Behavior normal.         Procedures           ED Course  ED Course as of    1646 EKG interpreted by me: Sinus bradycardia, first-degree AVB, no acute ST/T changes, this is an abnormal EKG, this appears similar to previous    [MP]      ED Course User Index  [MP] Ritesh Cox MD                                           MDM  Work appears reassuring.  Kidney function is actually improved from a month ago.  No signs of UTI.  Plan is concerned she is not drinking much we will give a small 500 bolus of fluids.  She has follow-up with her PCP tomorrow via Zoom for recheck.  Final diagnoses:   Decreased urine output            Lukas Will PA-C  20

## 2020-11-17 NOTE — ED NOTES
Lab and RN unable to get blood sample, Lukas and Dr. Cox notified. Dr. Cox will attempt to place ultrasound guided IV.      Chelsea Valdes, RN  11/17/20 0216

## 2020-11-18 ENCOUNTER — TELEMEDICINE (OUTPATIENT)
Dept: INTERNAL MEDICINE | Facility: CLINIC | Age: 85
End: 2020-11-18

## 2020-11-18 DIAGNOSIS — I10 BENIGN ESSENTIAL HYPERTENSION: Primary | ICD-10-CM

## 2020-11-18 DIAGNOSIS — E11.9 CONTROLLED TYPE 2 DIABETES MELLITUS WITHOUT COMPLICATION, WITHOUT LONG-TERM CURRENT USE OF INSULIN (HCC): ICD-10-CM

## 2020-11-18 DIAGNOSIS — G30.1 LATE ONSET ALZHEIMER'S DISEASE WITH BEHAVIORAL DISTURBANCE (HCC): ICD-10-CM

## 2020-11-18 DIAGNOSIS — F02.818 LATE ONSET ALZHEIMER'S DISEASE WITH BEHAVIORAL DISTURBANCE (HCC): ICD-10-CM

## 2020-11-18 DIAGNOSIS — N18.2 CHRONIC KIDNEY DISEASE, STAGE II (MILD): ICD-10-CM

## 2020-11-18 PROCEDURE — G2025 DIS SITE TELE SVCS RHC/FQHC: HCPCS | Performed by: INTERNAL MEDICINE

## 2020-11-18 NOTE — PROGRESS NOTES
Subjective   Valerie Davila is a 85 y.o. female.     You have chosen to receive care through a telehealth visit.  Do you consent to use a video/audio connection for your medical care today? Yes  Parties active in video visit  Katarzyna Knox CMA  Valerie Davila Patient  Jaki Davila Daughter        Chief Complaint   Patient presents with   • Hypertension   • Diabetes   • Chronic Kidney Disease     HPI: Patient is  Following up on the blood pressure and sugar the patient is taking the blood pressure medications as prescribed and has had no side effects. The patient is also  Cri and had  lab work done .   History today is from daughter Jaki, telephone visit today, patient is also following up on ER visit yesterday for decreased urine output, her ER records from November 17, 2020 at [facility] have been reviewed, labs and radiology reports have been reviewed and medications updated and reconciled, she received 500 cc of IV fluids and darin Pollack states that it has helped, today patient is more alert at home and is able to drink fluids which they have been thickening, Jaki also states that patient's urine output has improved and that patient is also afebrile    During today's visit, I reviewed the documented allergies, medications, chief complaint, and pertinent vitals.  I have confirmed with the patient that there have been no changes since this information was discussed with my clinical team member.    The following portions of the patient's history were reviewed and updated as appropriate: allergies, current medications, past family history, past medical history, past social history, past surgical history and problem list.    Review of Systems  Afebrile  Objective   Physical Exam      Assessment/Plan   Diagnoses and all orders for this visit:    1. Benign essential hypertension (Primary)    2. Late onset Alzheimer's disease with behavioral disturbance (CMS/HCC)    3. Chronic  kidney disease, stage II (mild)    4. Controlled type 2 diabetes mellitus without complication, without long-term current use of insulin (CMS/[facility])        Plan:  1.  Benign essential hypertension: Will continue current medication, low-sodium diet advised, Counseled to regularly check BP at home with goal averaging <130/80.   2.  Diabetes mellitus: Will monitor labs  3.  CRI stage II: Oral hydration advised, labs reviewed  4.  Late onset Alzheimer's disease: To continue current medications per [facility] neurology    This visit has been rescheduled as a phone visit to comply with patient safety concerns in accordance with CDC recommendations. Total time of discussion was 12  minutes.      This was an audio  enabled telemedicine encounter.

## 2020-11-19 ENCOUNTER — OUTSIDE FACILITY SERVICE (OUTPATIENT)
Dept: INTERNAL MEDICINE | Facility: CLINIC | Age: 85
End: 2020-11-19

## 2020-11-19 PROCEDURE — G0179 MD RECERTIFICATION HHA PT: HCPCS | Performed by: INTERNAL MEDICINE

## 2020-12-07 RX ORDER — HYDRALAZINE HYDROCHLORIDE 50 MG/1
TABLET, FILM COATED ORAL
Qty: 90 TABLET | Refills: 3 | Status: SHIPPED | OUTPATIENT
Start: 2020-12-07 | End: 2021-02-05

## 2021-01-06 RX ORDER — CARVEDILOL 25 MG/1
TABLET ORAL
Qty: 180 TABLET | Refills: 0 | Status: SHIPPED | OUTPATIENT
Start: 2021-01-06

## 2021-02-05 RX ORDER — HYDRALAZINE HYDROCHLORIDE 50 MG/1
TABLET, FILM COATED ORAL
Qty: 90 TABLET | Refills: 1 | Status: SHIPPED | OUTPATIENT
Start: 2021-02-05

## 2021-03-08 RX ORDER — FUROSEMIDE 40 MG/1
TABLET ORAL
Qty: 180 TABLET | Refills: 4 | Status: SHIPPED | OUTPATIENT
Start: 2021-03-08

## 2021-03-23 NOTE — TELEPHONE ENCOUNTER
"\"Yes hi good morning this is Angelia calling from [facility] and I am calling regarding a I guess a follow up question regarding a referral that we received on Valerie Davila. YOB: 1935. She was initially referred to hospice last month but we are going to go out and see her again tomorrow to see if she's a hospice appropriate and I was just confirming to see if Dr. Griggs would be willing to follow up here or if she is still transferring. If you could please return my call at [phone]. Thank you very much. Rosy.\"    " Apixaban/Eliquis increases your risk for bleeding. Notify your doctor if you experience any of the following side effects: bleeding, coughing or vomiting blood, red or black stool, unexpected pain or swelling, itching or hives, chest pain, chest tightness, trouble breathing, changes in how much or how often you urinate, red or pink urine, numbness or tingling in your feet, or unusual muscle weakness. When Apixaban/Eliquis is taken with other medicines, they can affect how it works. Taking other medications such as aspirin, blood thinners, nonsteroidal anti-inflammatories, and medications that treat depression can increase your risk of bleeding. It is very important to tell your health care provider about all of the other medicines, including over-the-counter medications, herbs, and vitamins you are taking. DO NOT start, stop, or change the dosage of any medicine, including over-the-counter medicines, vitamins, and herbal products without your doctor’s approval. Any products containing aspirin or are nonsteroidal anti-inflammatories lessen the blood’s ability to form clots and add to the effect of Apixaban/Eliquis. Never take aspirin or medicines that contain aspirin without speaking to your doctor.

## 2021-04-06 RX ORDER — OLMESARTAN MEDOXOMIL 40 MG/1
TABLET ORAL
Qty: 90 TABLET | Refills: 2 | OUTPATIENT
Start: 2021-04-06

## 2021-04-06 RX ORDER — CARVEDILOL 25 MG/1
TABLET ORAL
Qty: 180 TABLET | Refills: 1 | OUTPATIENT
Start: 2021-04-06

## 2021-04-06 RX ORDER — LEVOTHYROXINE SODIUM 88 UG/1
TABLET ORAL
Qty: 18 TABLET | Refills: 1 | OUTPATIENT
Start: 2021-04-06

## 2021-05-06 DIAGNOSIS — E78.2 MIXED HYPERLIPIDEMIA: Primary | ICD-10-CM

## 2021-05-06 RX ORDER — PRAVASTATIN SODIUM 40 MG
TABLET ORAL
Qty: 90 TABLET | Refills: 1 | OUTPATIENT
Start: 2021-05-06

## 2021-05-06 RX ORDER — HYDRALAZINE HYDROCHLORIDE 50 MG/1
TABLET, FILM COATED ORAL
Qty: 270 TABLET | Refills: 2 | OUTPATIENT
Start: 2021-05-06

## 2021-06-07 RX ORDER — HYDRALAZINE HYDROCHLORIDE 50 MG/1
TABLET, FILM COATED ORAL
Qty: 90 TABLET | Refills: 4 | OUTPATIENT
Start: 2021-06-07

## 2021-06-30 DIAGNOSIS — I10 BENIGN ESSENTIAL HYPERTENSION: Primary | ICD-10-CM

## 2021-07-01 RX ORDER — OLMESARTAN MEDOXOMIL 40 MG/1
TABLET ORAL
Qty: 90 TABLET | Refills: 3 | OUTPATIENT
Start: 2021-07-01

## 2021-07-06 RX ORDER — OLMESARTAN MEDOXOMIL 40 MG/1
TABLET ORAL
Qty: 71 TABLET | Refills: 2 | OUTPATIENT
Start: 2021-07-06

## 2021-07-06 RX ORDER — POTASSIUM CHLORIDE 20MEQ/15ML
LIQUID (ML) ORAL
Refills: 10 | OUTPATIENT
Start: 2021-07-06

## 2021-08-07 RX ORDER — POTASSIUM CHLORIDE 20MEQ/15ML
LIQUID (ML) ORAL
OUTPATIENT
Start: 2021-08-07

## 2021-09-07 RX ORDER — POTASSIUM CHLORIDE 20MEQ/15ML
LIQUID (ML) ORAL
OUTPATIENT
Start: 2021-09-07

## (undated) DEVICE — CONMED SCOPE SAVER BITE BLOCK, 20X27 MM: Brand: SCOPE SAVER

## (undated) DEVICE — FRCP BIOP COLD ENDOJAW ALLGTR W/NDL 2.8X2300MM BLU

## (undated) DEVICE — ENDOGATOR AUXILIARY WATER JET CONNECTOR: Brand: ENDOGATOR

## (undated) DEVICE — Device

## (undated) DEVICE — ESOPHAGEAL BALLOON DILATATION CATHETER: Brand: CRE FIXED WIRE

## (undated) DEVICE — 2000CC GUARDIAN II: Brand: GUARDIAN

## (undated) DEVICE — KT ORCA VLV SXN AIR/H2O W/SEAL 1P/U STRL

## (undated) DEVICE — DEV INFL ALLIANCE2 SYS